# Patient Record
Sex: FEMALE | Race: WHITE | Employment: PART TIME
[De-identification: names, ages, dates, MRNs, and addresses within clinical notes are randomized per-mention and may not be internally consistent; named-entity substitution may affect disease eponyms.]

---

## 2017-01-06 LAB
CHOLESTEROL, TOTAL: 179 MG/DL
CHOLESTEROL/HDL RATIO: 3.1
HDLC SERPL-MCNC: 57 MG/DL (ref 35–70)
LDL CHOLESTEROL CALCULATED: 108 MG/DL (ref 0–160)
TRIGL SERPL-MCNC: 71 MG/DL
VLDLC SERPL CALC-MCNC: 14 MG/DL

## 2017-01-12 DIAGNOSIS — Z13.9 SCREENING: ICD-10-CM

## 2017-01-17 ENCOUNTER — OFFICE VISIT (OUTPATIENT)
Dept: INTERNAL MEDICINE | Facility: CLINIC | Age: 32
End: 2017-01-17

## 2017-01-17 VITALS
BODY MASS INDEX: 23.94 KG/M2 | RESPIRATION RATE: 16 BRPM | TEMPERATURE: 98.9 F | SYSTOLIC BLOOD PRESSURE: 120 MMHG | HEART RATE: 76 BPM | WEIGHT: 140.2 LBS | DIASTOLIC BLOOD PRESSURE: 70 MMHG

## 2017-01-17 DIAGNOSIS — G43.109 MIGRAINE WITH AURA AND WITHOUT STATUS MIGRAINOSUS, NOT INTRACTABLE: Primary | ICD-10-CM

## 2017-01-17 PROCEDURE — 99214 OFFICE O/P EST MOD 30 MIN: CPT | Performed by: FAMILY MEDICINE

## 2017-01-17 RX ORDER — ATENOLOL 25 MG/1
25 TABLET ORAL DAILY
Qty: 30 TABLET | Refills: 3 | Status: SHIPPED | OUTPATIENT
Start: 2017-01-17 | End: 2017-03-14 | Stop reason: ALTCHOICE

## 2017-01-17 RX ORDER — RIZATRIPTAN BENZOATE 10 MG/1
10 TABLET ORAL
Qty: 30 TABLET | Refills: 3 | Status: SHIPPED | OUTPATIENT
Start: 2017-01-17 | End: 2017-06-22 | Stop reason: SDUPTHER

## 2017-01-17 ASSESSMENT — ENCOUNTER SYMPTOMS
SORE THROAT: 0
STRIDOR: 0
ABDOMINAL PAIN: 0
BACK PAIN: 0
CONSTIPATION: 0
TROUBLE SWALLOWING: 0
FACIAL SWELLING: 0
ABDOMINAL DISTENTION: 0
EYE DISCHARGE: 0
EYE REDNESS: 0
SHORTNESS OF BREATH: 0
COUGH: 0
EYE PAIN: 0
ANAL BLEEDING: 0
WHEEZING: 0
COLOR CHANGE: 0
CHEST TIGHTNESS: 0

## 2017-03-14 ENCOUNTER — OFFICE VISIT (OUTPATIENT)
Dept: NEUROLOGY | Age: 32
End: 2017-03-14
Payer: COMMERCIAL

## 2017-03-14 VITALS
WEIGHT: 143.6 LBS | HEART RATE: 78 BPM | HEIGHT: 64 IN | BODY MASS INDEX: 24.52 KG/M2 | SYSTOLIC BLOOD PRESSURE: 106 MMHG | DIASTOLIC BLOOD PRESSURE: 86 MMHG

## 2017-03-14 DIAGNOSIS — G43.719 INTRACTABLE CHRONIC MIGRAINE WITHOUT AURA AND WITHOUT STATUS MIGRAINOSUS: Primary | ICD-10-CM

## 2017-03-14 DIAGNOSIS — G44.211 INTRACTABLE EPISODIC TENSION-TYPE HEADACHE: ICD-10-CM

## 2017-03-14 PROCEDURE — 99204 OFFICE O/P NEW MOD 45 MIN: CPT | Performed by: PSYCHIATRY & NEUROLOGY

## 2017-03-14 RX ORDER — AMITRIPTYLINE HYDROCHLORIDE 10 MG/1
TABLET, FILM COATED ORAL
Qty: 90 TABLET | Refills: 1 | Status: SHIPPED | OUTPATIENT
Start: 2017-03-14 | End: 2017-05-30 | Stop reason: DRUGHIGH

## 2017-03-14 RX ORDER — BUTALBITAL, ACETAMINOPHEN AND CAFFEINE 50; 325; 40 MG/1; MG/1; MG/1
TABLET ORAL
Qty: 30 TABLET | Refills: 0 | Status: SHIPPED | OUTPATIENT
Start: 2017-03-14 | End: 2017-06-22 | Stop reason: SDUPTHER

## 2017-03-14 RX ORDER — TIZANIDINE 2 MG/1
TABLET ORAL
Qty: 20 TABLET | Refills: 0 | Status: SHIPPED | OUTPATIENT
Start: 2017-03-14 | End: 2017-10-31 | Stop reason: SDUPTHER

## 2017-03-17 ENCOUNTER — OFFICE VISIT (OUTPATIENT)
Dept: INTERNAL MEDICINE CLINIC | Age: 32
End: 2017-03-17
Payer: COMMERCIAL

## 2017-03-17 VITALS
HEIGHT: 64 IN | TEMPERATURE: 98.6 F | OXYGEN SATURATION: 98 % | DIASTOLIC BLOOD PRESSURE: 64 MMHG | WEIGHT: 142 LBS | HEART RATE: 77 BPM | SYSTOLIC BLOOD PRESSURE: 100 MMHG | RESPIRATION RATE: 16 BRPM | BODY MASS INDEX: 24.24 KG/M2

## 2017-03-17 DIAGNOSIS — G43.109 MIGRAINE WITH AURA AND WITHOUT STATUS MIGRAINOSUS, NOT INTRACTABLE: Primary | ICD-10-CM

## 2017-03-17 PROCEDURE — 99213 OFFICE O/P EST LOW 20 MIN: CPT | Performed by: FAMILY MEDICINE

## 2017-03-17 ASSESSMENT — ENCOUNTER SYMPTOMS
TROUBLE SWALLOWING: 0
CHEST TIGHTNESS: 0
ABDOMINAL PAIN: 0
COLOR CHANGE: 0
EYE DISCHARGE: 0
EYE REDNESS: 0
EYE PAIN: 0
CONSTIPATION: 0
FACIAL SWELLING: 0
WHEEZING: 0
BACK PAIN: 0
ABDOMINAL DISTENTION: 0
SORE THROAT: 0
ANAL BLEEDING: 0
COUGH: 0
STRIDOR: 0
SHORTNESS OF BREATH: 0

## 2017-03-17 ASSESSMENT — PATIENT HEALTH QUESTIONNAIRE - PHQ9
1. LITTLE INTEREST OR PLEASURE IN DOING THINGS: 0
2. FEELING DOWN, DEPRESSED OR HOPELESS: 0
SUM OF ALL RESPONSES TO PHQ QUESTIONS 1-9: 0
SUM OF ALL RESPONSES TO PHQ9 QUESTIONS 1 & 2: 0

## 2017-04-10 ENCOUNTER — TELEPHONE (OUTPATIENT)
Dept: NEUROLOGY | Age: 32
End: 2017-04-10

## 2017-04-17 ENCOUNTER — TELEPHONE (OUTPATIENT)
Dept: NEUROLOGY | Age: 32
End: 2017-04-17

## 2017-04-27 RX ORDER — AMITRIPTYLINE HYDROCHLORIDE 50 MG/1
50 TABLET, FILM COATED ORAL NIGHTLY
Qty: 30 TABLET | Refills: 1 | Status: SHIPPED | OUTPATIENT
Start: 2017-04-27 | End: 2017-06-22 | Stop reason: SDUPTHER

## 2017-05-30 ENCOUNTER — OFFICE VISIT (OUTPATIENT)
Dept: NEUROLOGY | Age: 32
End: 2017-05-30
Payer: COMMERCIAL

## 2017-05-30 VITALS
DIASTOLIC BLOOD PRESSURE: 95 MMHG | SYSTOLIC BLOOD PRESSURE: 143 MMHG | BODY MASS INDEX: 23.73 KG/M2 | WEIGHT: 139 LBS | HEART RATE: 93 BPM | HEIGHT: 64 IN

## 2017-05-30 DIAGNOSIS — G43.719 INTRACTABLE CHRONIC MIGRAINE WITHOUT AURA AND WITHOUT STATUS MIGRAINOSUS: Primary | ICD-10-CM

## 2017-05-30 PROCEDURE — 99214 OFFICE O/P EST MOD 30 MIN: CPT | Performed by: NURSE PRACTITIONER

## 2017-06-22 DIAGNOSIS — G44.211 INTRACTABLE EPISODIC TENSION-TYPE HEADACHE: ICD-10-CM

## 2017-06-22 DIAGNOSIS — G43.719 INTRACTABLE CHRONIC MIGRAINE WITHOUT AURA AND WITHOUT STATUS MIGRAINOSUS: ICD-10-CM

## 2017-06-22 DIAGNOSIS — G43.109 MIGRAINE WITH AURA AND WITHOUT STATUS MIGRAINOSUS, NOT INTRACTABLE: ICD-10-CM

## 2017-06-22 RX ORDER — AMITRIPTYLINE HYDROCHLORIDE 50 MG/1
50 TABLET, FILM COATED ORAL NIGHTLY
Qty: 30 TABLET | Refills: 1 | Status: SHIPPED | OUTPATIENT
Start: 2017-06-22 | End: 2017-08-23 | Stop reason: SDUPTHER

## 2017-06-22 RX ORDER — BUTALBITAL, ACETAMINOPHEN AND CAFFEINE 50; 325; 40 MG/1; MG/1; MG/1
TABLET ORAL
Qty: 30 TABLET | Refills: 0 | Status: SHIPPED | OUTPATIENT
Start: 2017-06-22 | End: 2017-10-31 | Stop reason: SDUPTHER

## 2017-06-22 RX ORDER — RIZATRIPTAN BENZOATE 10 MG/1
10 TABLET ORAL
Qty: 30 TABLET | Refills: 3 | Status: SHIPPED | OUTPATIENT
Start: 2017-06-22 | End: 2018-03-07 | Stop reason: SDUPTHER

## 2017-08-23 RX ORDER — AMITRIPTYLINE HYDROCHLORIDE 50 MG/1
50 TABLET, FILM COATED ORAL NIGHTLY
Qty: 30 TABLET | Refills: 2 | Status: SHIPPED | OUTPATIENT
Start: 2017-08-23 | End: 2017-11-13 | Stop reason: SDUPTHER

## 2017-09-22 ENCOUNTER — OFFICE VISIT (OUTPATIENT)
Dept: INTERNAL MEDICINE CLINIC | Age: 32
End: 2017-09-22
Payer: COMMERCIAL

## 2017-09-22 VITALS
SYSTOLIC BLOOD PRESSURE: 110 MMHG | RESPIRATION RATE: 25 BRPM | OXYGEN SATURATION: 98 % | HEIGHT: 64 IN | HEART RATE: 108 BPM | BODY MASS INDEX: 24.11 KG/M2 | DIASTOLIC BLOOD PRESSURE: 80 MMHG | TEMPERATURE: 98 F | WEIGHT: 141.2 LBS

## 2017-09-22 DIAGNOSIS — Z83.3 FAMILY HISTORY OF DIABETES MELLITUS (DM): ICD-10-CM

## 2017-09-22 DIAGNOSIS — J31.0 CHRONIC RHINITIS: ICD-10-CM

## 2017-09-22 DIAGNOSIS — G43.009 MIGRAINE WITHOUT AURA AND WITHOUT STATUS MIGRAINOSUS, NOT INTRACTABLE: Primary | ICD-10-CM

## 2017-09-22 PROBLEM — G44.211 INTRACTABLE EPISODIC TENSION-TYPE HEADACHE: Status: RESOLVED | Noted: 2017-03-14 | Resolved: 2017-09-22

## 2017-09-22 PROCEDURE — 99213 OFFICE O/P EST LOW 20 MIN: CPT | Performed by: FAMILY MEDICINE

## 2017-09-22 ASSESSMENT — ENCOUNTER SYMPTOMS
EYES NEGATIVE: 1
ALLERGIC/IMMUNOLOGIC NEGATIVE: 1
RESPIRATORY NEGATIVE: 1
GASTROINTESTINAL NEGATIVE: 1

## 2017-10-27 ENCOUNTER — OFFICE VISIT (OUTPATIENT)
Dept: INTERNAL MEDICINE CLINIC | Age: 32
End: 2017-10-27
Payer: COMMERCIAL

## 2017-10-27 ENCOUNTER — HOSPITAL ENCOUNTER (OUTPATIENT)
Age: 32
Setting detail: SPECIMEN
Discharge: HOME | End: 2017-10-27
Payer: COMMERCIAL

## 2017-10-27 VITALS — WEIGHT: 141.09 LBS | HEIGHT: 64 IN | BODY MASS INDEX: 24.09 KG/M2

## 2017-10-27 DIAGNOSIS — G43.719 INTRACTABLE CHRONIC MIGRAINE WITHOUT AURA AND WITHOUT STATUS MIGRAINOSUS: ICD-10-CM

## 2017-10-27 DIAGNOSIS — N63.20 BREAST MASS, LEFT: ICD-10-CM

## 2017-10-27 DIAGNOSIS — F41.9 ANXIETY: ICD-10-CM

## 2017-10-27 DIAGNOSIS — N76.1 SUBACUTE VAGINITIS: ICD-10-CM

## 2017-10-27 DIAGNOSIS — R92.2 DENSE BREAST TISSUE: ICD-10-CM

## 2017-10-27 DIAGNOSIS — Z01.419 PAP SMEAR, AS PART OF ROUTINE GYNECOLOGICAL EXAMINATION: ICD-10-CM

## 2017-10-27 DIAGNOSIS — N76.1 SUBACUTE VAGINITIS: Primary | ICD-10-CM

## 2017-10-27 DIAGNOSIS — N60.02 CYST OF LEFT BREAST: ICD-10-CM

## 2017-10-27 LAB
CONTROL: NORMAL
PREGNANCY TEST URINE, POC: NEGATIVE

## 2017-10-27 PROCEDURE — 81025 URINE PREGNANCY TEST: CPT | Performed by: FAMILY MEDICINE

## 2017-10-27 PROCEDURE — 99214 OFFICE O/P EST MOD 30 MIN: CPT | Performed by: FAMILY MEDICINE

## 2017-10-27 ASSESSMENT — ENCOUNTER SYMPTOMS
RESPIRATORY NEGATIVE: 1
GASTROINTESTINAL NEGATIVE: 1
EYES NEGATIVE: 1
ALLERGIC/IMMUNOLOGIC NEGATIVE: 1

## 2017-10-27 NOTE — PROGRESS NOTES
Chronic Disease Visit Information    BP Readings from Last 3 Encounters:   09/22/17 110/80   05/30/17 (!) 143/95   03/17/17 100/64          LDL Calculated (mg/dL)   Date Value   01/06/2017 108     HDL (mg/dL)   Date Value   01/06/2017 57            Have you changed or started any medications since your last visit including any over-the-counter medicines, vitamins, or herbal medicines? no   Are you having any side effects from any of your medications? -  no  Have you stopped taking any of your medications? Is so, why? -  no    Have you seen any other physician or provider since your last visit? No  Have you had any other diagnostic tests since your last visit? No  Have you been seen in the emergency room and/or had an admission to a hospital since we last saw you? No  Have you had your annual diabetic retinal (eye) exam? No  Have you had your routine dental cleaning in the past 6 months? no    Have you activated your [employer] account? If not, what are your barriers?  Yes     Patient Care Team:  Rody Trimble MD as PCP - General (Family Medicine)  Sarah Win MD as Consulting Physician (Neurology)         Medical History Review  Past Medical, Family, and Social History reviewed and does contribute to the patient presenting condition    Health Maintenance   Topic Date Due    Cervical cancer screen  10/16/2018 (Originally 1/15/2017)    HIV screen  10/16/2018 (Originally 2/2/2001)    Flu vaccine (1) 10/23/2018 (Originally 9/1/2017)    DTaP/Tdap/Td vaccine (3 - Td) 12/20/2026
was told to be a benign cyst.  Underlying history of migraine headache without aura. Neurologically intact. She is provided with reassurance, improved oral hydration, sleep hygiene, relaxation, daily moderate exercise. Refrain from judicious use of over-the-counter pain medication. Underlying history of anxiety. Continue Elavil  She denies significant alcohol or illicit drug use  Further recommendations to follow  Call for any concern  This note is created with a voice recognition program and while intend to generate a document that accurately reflects the content of the visit, no guarantee can be provided that every mistake has been identified and corrected by editing.

## 2017-10-30 LAB
CHLAMYDIA BY THIN PREP: NEGATIVE
N. GONORRHOEAE DNA, THIN PREP: NEGATIVE

## 2017-10-31 ENCOUNTER — OFFICE VISIT (OUTPATIENT)
Dept: NEUROLOGY | Age: 32
End: 2017-10-31
Payer: COMMERCIAL

## 2017-10-31 VITALS
DIASTOLIC BLOOD PRESSURE: 96 MMHG | BODY MASS INDEX: 23.9 KG/M2 | SYSTOLIC BLOOD PRESSURE: 147 MMHG | HEIGHT: 64 IN | HEART RATE: 90 BPM | WEIGHT: 140 LBS

## 2017-10-31 DIAGNOSIS — G44.211 INTRACTABLE EPISODIC TENSION-TYPE HEADACHE: ICD-10-CM

## 2017-10-31 DIAGNOSIS — G43.719 INTRACTABLE CHRONIC MIGRAINE WITHOUT AURA AND WITHOUT STATUS MIGRAINOSUS: Primary | ICD-10-CM

## 2017-10-31 DIAGNOSIS — M62.838 MUSCLE SPASM: ICD-10-CM

## 2017-10-31 PROCEDURE — 99214 OFFICE O/P EST MOD 30 MIN: CPT | Performed by: PSYCHIATRY & NEUROLOGY

## 2017-10-31 RX ORDER — BUTALBITAL, ACETAMINOPHEN AND CAFFEINE 50; 325; 40 MG/1; MG/1; MG/1
TABLET ORAL
Qty: 30 TABLET | Refills: 0 | Status: SHIPPED | OUTPATIENT
Start: 2017-10-31 | End: 2018-02-19 | Stop reason: SDUPTHER

## 2017-10-31 RX ORDER — AMITRIPTYLINE HYDROCHLORIDE 10 MG/1
TABLET, FILM COATED ORAL
Qty: 60 TABLET | Refills: 3 | Status: SHIPPED | OUTPATIENT
Start: 2017-10-31 | End: 2018-06-14 | Stop reason: ALTCHOICE

## 2017-10-31 RX ORDER — TIZANIDINE 2 MG/1
TABLET ORAL
Qty: 30 TABLET | Refills: 1 | Status: SHIPPED | OUTPATIENT
Start: 2017-10-31 | End: 2018-02-19 | Stop reason: SDUPTHER

## 2017-10-31 NOTE — PROGRESS NOTES
breast    TUBAL LIGATION  2014     Social History: Sharyl Mcburney  reports that she has never smoked. She has never used smokeless tobacco. She reports that she does not drink alcohol or use drugs. Family History   Problem Relation Age of Onset    High Blood Pressure Father     Migraines Sister     Migraines Paternal Grandmother     Heart Disease Paternal Grandmother     Cancer Paternal Grandfather      brain       On exam: Blood pressure (!) 147/96, pulse 90, height 5' 4\" (1.626 m), weight 140 lb (63.5 kg), not currently breastfeeding. GENERAL  Appears comfortable and in no distress   HEENT  NC/ AT   NECK  Supple and no bruits heard   MENTAL STATUS:  Alert, oriented, intact memory, no confusion, normal speech, normal language, no hallucination or delusion   CRANIAL NERVES: II     -      Visual fields intact to confrontation  III,IV,VI -  EOMs full, no afferent defect, no                      KEHINDE, no ptosis  V     -     Normal facial sensation  VII    -     Normal facial symmetry  VIII   -     Intact hearing  IX,X -     Symmetrical palate  XI    -     Symmetrical shoulder shrug  XII   -     Midline tongue, no atrophy    MOTOR FUNCTION:  significant for good strength of grade 5/5 in bilateral proximal and distal muscle groups of both upper and lower extremities with normal bulk, normal tone and no involuntary movements, no tremor   SENSORY FUNCTION:  Normal touch, normal pin, normal vibration, normal proprioception   CEREBELLAR FUNCTION:  Intact fine motor control over upper limbs   REFLEX FUNCTION:  Symmetric, no perverted reflex, no Babinski sign   STATION and GAIT  Normal station, normal gait       Diagnostic data reviewed with the patient:   MRI Brain (w/wo) (1/31/17): No intracranial abnormality. Normal MRI of the brain.           Impression and Plan: Ms. Sharyl Mcburney is a 33 y.o. female with   Intractable migrainous headaches without aura; chronic episodic tension-type headaches; muscle

## 2017-11-06 LAB — CYTOLOGY REPORT: NORMAL

## 2017-11-15 RX ORDER — AMITRIPTYLINE HYDROCHLORIDE 50 MG/1
50 TABLET, FILM COATED ORAL NIGHTLY
Qty: 30 TABLET | Refills: 2 | Status: SHIPPED | OUTPATIENT
Start: 2017-11-15 | End: 2018-02-19 | Stop reason: SDUPTHER

## 2018-02-19 DIAGNOSIS — G43.719 INTRACTABLE CHRONIC MIGRAINE WITHOUT AURA AND WITHOUT STATUS MIGRAINOSUS: ICD-10-CM

## 2018-02-19 DIAGNOSIS — M62.838 MUSCLE SPASM: ICD-10-CM

## 2018-02-19 DIAGNOSIS — G44.211 INTRACTABLE EPISODIC TENSION-TYPE HEADACHE: ICD-10-CM

## 2018-02-19 RX ORDER — AMITRIPTYLINE HYDROCHLORIDE 50 MG/1
50 TABLET, FILM COATED ORAL NIGHTLY
Qty: 30 TABLET | Refills: 0 | Status: SHIPPED | OUTPATIENT
Start: 2018-02-19 | End: 2018-06-14 | Stop reason: ALTCHOICE

## 2018-02-19 RX ORDER — TIZANIDINE 2 MG/1
TABLET ORAL
Qty: 30 TABLET | Refills: 0 | Status: SHIPPED | OUTPATIENT
Start: 2018-02-19 | End: 2018-03-07 | Stop reason: SDUPTHER

## 2018-02-19 RX ORDER — BUTALBITAL, ACETAMINOPHEN AND CAFFEINE 50; 325; 40 MG/1; MG/1; MG/1
TABLET ORAL
Qty: 30 TABLET | Refills: 0 | Status: SHIPPED | OUTPATIENT
Start: 2018-02-19 | End: 2018-03-07 | Stop reason: SDUPTHER

## 2018-03-07 ENCOUNTER — OFFICE VISIT (OUTPATIENT)
Dept: NEUROLOGY | Age: 33
End: 2018-03-07
Payer: COMMERCIAL

## 2018-03-07 VITALS
BODY MASS INDEX: 23.8 KG/M2 | HEIGHT: 64 IN | DIASTOLIC BLOOD PRESSURE: 92 MMHG | SYSTOLIC BLOOD PRESSURE: 138 MMHG | HEART RATE: 104 BPM | WEIGHT: 139.4 LBS

## 2018-03-07 DIAGNOSIS — M62.838 MUSCLE SPASM: ICD-10-CM

## 2018-03-07 DIAGNOSIS — G44.221 CHRONIC TENSION-TYPE HEADACHE, INTRACTABLE: Primary | ICD-10-CM

## 2018-03-07 DIAGNOSIS — G43.109 MIGRAINE WITH AURA AND WITHOUT STATUS MIGRAINOSUS, NOT INTRACTABLE: ICD-10-CM

## 2018-03-07 PROCEDURE — 99214 OFFICE O/P EST MOD 30 MIN: CPT | Performed by: PSYCHIATRY & NEUROLOGY

## 2018-03-07 RX ORDER — RIZATRIPTAN BENZOATE 10 MG/1
10 TABLET ORAL
Qty: 30 TABLET | Refills: 3 | Status: SHIPPED | OUTPATIENT
Start: 2018-03-07 | End: 2018-06-14 | Stop reason: SDUPTHER

## 2018-03-07 RX ORDER — TIZANIDINE 4 MG/1
TABLET ORAL
Qty: 30 TABLET | Refills: 1 | Status: SHIPPED | OUTPATIENT
Start: 2018-03-07 | End: 2018-06-14 | Stop reason: SDUPTHER

## 2018-03-07 RX ORDER — BUTALBITAL, ACETAMINOPHEN AND CAFFEINE 50; 325; 40 MG/1; MG/1; MG/1
TABLET ORAL
Qty: 30 TABLET | Refills: 0 | Status: SHIPPED | OUTPATIENT
Start: 2018-03-07 | End: 2018-06-14 | Stop reason: SDUPTHER

## 2018-03-07 RX ORDER — GABAPENTIN 100 MG/1
CAPSULE ORAL
Qty: 120 CAPSULE | Refills: 3 | Status: SHIPPED | OUTPATIENT
Start: 2018-03-07 | End: 2018-06-14 | Stop reason: SDUPTHER

## 2018-03-07 NOTE — PROGRESS NOTES
NEUROLOGY FOLLOW-UP  Patient Name:  Manuel Chun  :   1985  Clinic Visit Date: 3/7/2018    I saw Ms. Manuel Chun in follow-up in the office today in continuation of neurologic care. She is a 29 y.o. right handed  female initially seen in neurologic consultation in 2017 for for evaluation of worsening headaches in frequency and severity. She stated that she had headaches since teenage years \"for at least 13 years\". Today she comes to chronic for follow-up stating that Maxalt has been helping for migraine attacks. Those were described as severe throbbing pain located in left parietotemporal region without any radiation. Those headaches are associated with photophobia, phonophobia and nausea and occur at extremely severe intensity. Maxalt always helps. Rarely she needed to take twice in a day. On average she has these severe migraine attacks only twice or thrice a month. But she also stated that she has been having chronic daily headaches described as mild to moderate headaches not associated with photophobia and phonophobia. Those headaches always occur in back of the head radiating to top of the head and they are not associated with nausea and vomiting. She has tried NSAIDs without any relief. She is trying to [place] with those\". She also stated that increasing the dose of amitriptyline has not helped. She is presently taking amitriptyline 70 mg nightly. She does not want to increase the dose of amitriptyline and she is asking for any other alternatives. She also stated that she never had any strokelike symptomatology associated with headaches. Denies diplopia, vertigo, facial numbness and weakness, dizziness and loss of consciousness. Denies recent febrile illnesses. She does admit to having neck stiffness and neck pain but denies radiating pain and paresthesias. He denies bladder dysfunction. Denies numbness or weakness in extremities.   Of note; she has family reviewed with the patient:   MRI Brain (w/wo) (1/31/17): No intracranial abnormality. Normal MRI of the brain. CBC:   No results found for: WBC, HGB, PLT   BMP:   No results found for: NA, K, GLUCOSE, CALCIUM      Lab Results   Component Value Date    CHOL 179 01/06/2017    HDL 57 01/06/2017    TRIG 71 01/06/2017               Impression and Plan: Ms. Carroll Grimes is a 29 y.o. female with   Intractable and chronic daily tension type headaches; no significant improvement on amitriptyline   Intermittent severe migrainous headaches without aura: Responding to triptan therapy  Will continue Maxalt as needed only as she has been responding well to it. Will start her on gabapentin at gradual dose escalation. Will continue amitriptyline 50 g nightly at this point of time. Also advised to take tizanidine 4 mg nightly for muscle spasms. As her symptoms do not indicate radiculopathy and presentation is not suggestive of myelopathy; I do not see any need to get MRI cervical spine at this point of time. Patient was strongly advised to contact the clinic should she develop any of those symptomatology. She voiced understanding those instructions. Medication Counseling: risks and benefits including possible adverse effects discussed with the patient and she verbalized understanding those instructions. Follow-up in 3 months or sooner for further questions and concerns. Please note that portions of this note were completed with a voice recognition program.  Although every effort was made to ensure the accuracy of this  automated transcription, some errors in transcription may have occurred, occasionally words are mis-transcribed.

## 2018-06-14 ENCOUNTER — OFFICE VISIT (OUTPATIENT)
Dept: NEUROLOGY | Age: 33
End: 2018-06-14
Payer: COMMERCIAL

## 2018-06-14 VITALS
SYSTOLIC BLOOD PRESSURE: 131 MMHG | WEIGHT: 139 LBS | BODY MASS INDEX: 23.73 KG/M2 | HEIGHT: 64 IN | DIASTOLIC BLOOD PRESSURE: 88 MMHG | HEART RATE: 83 BPM

## 2018-06-14 DIAGNOSIS — G44.221 CHRONIC TENSION-TYPE HEADACHE, INTRACTABLE: Primary | ICD-10-CM

## 2018-06-14 DIAGNOSIS — R11.0 NAUSEA: ICD-10-CM

## 2018-06-14 DIAGNOSIS — M62.838 MUSCLE SPASM: ICD-10-CM

## 2018-06-14 DIAGNOSIS — G43.109 MIGRAINE WITH AURA AND WITHOUT STATUS MIGRAINOSUS, NOT INTRACTABLE: ICD-10-CM

## 2018-06-14 PROCEDURE — 99214 OFFICE O/P EST MOD 30 MIN: CPT | Performed by: PSYCHIATRY & NEUROLOGY

## 2018-06-14 RX ORDER — TIZANIDINE 4 MG/1
TABLET ORAL
Qty: 30 TABLET | Refills: 1 | Status: SHIPPED | OUTPATIENT
Start: 2018-06-14 | End: 2018-09-19 | Stop reason: SDUPTHER

## 2018-06-14 RX ORDER — GABAPENTIN 300 MG/1
CAPSULE ORAL
Qty: 60 CAPSULE | Refills: 3 | Status: SHIPPED | OUTPATIENT
Start: 2018-06-14 | End: 2018-09-19 | Stop reason: SDUPTHER

## 2018-06-14 RX ORDER — BUTALBITAL, ACETAMINOPHEN AND CAFFEINE 50; 325; 40 MG/1; MG/1; MG/1
TABLET ORAL
Qty: 40 TABLET | Refills: 0 | Status: SHIPPED | OUTPATIENT
Start: 2018-06-14 | End: 2019-01-07 | Stop reason: SDUPTHER

## 2018-06-14 RX ORDER — ONDANSETRON 4 MG/1
TABLET, FILM COATED ORAL
Qty: 40 TABLET | Refills: 1 | Status: SHIPPED | OUTPATIENT
Start: 2018-06-14 | End: 2021-03-23 | Stop reason: SDUPTHER

## 2018-06-14 RX ORDER — RIZATRIPTAN BENZOATE 10 MG/1
10 TABLET ORAL
Qty: 30 TABLET | Refills: 3 | Status: SHIPPED | OUTPATIENT
Start: 2018-06-14 | End: 2018-09-19 | Stop reason: SDUPTHER

## 2018-09-19 ENCOUNTER — OFFICE VISIT (OUTPATIENT)
Dept: NEUROLOGY | Age: 33
End: 2018-09-19
Payer: COMMERCIAL

## 2018-09-19 VITALS
HEART RATE: 82 BPM | DIASTOLIC BLOOD PRESSURE: 101 MMHG | WEIGHT: 139.6 LBS | SYSTOLIC BLOOD PRESSURE: 138 MMHG | HEIGHT: 64 IN | BODY MASS INDEX: 23.83 KG/M2

## 2018-09-19 DIAGNOSIS — G44.221 CHRONIC TENSION-TYPE HEADACHE, INTRACTABLE: Primary | ICD-10-CM

## 2018-09-19 DIAGNOSIS — M62.838 MUSCLE SPASM: ICD-10-CM

## 2018-09-19 DIAGNOSIS — G43.109 MIGRAINE WITH AURA AND WITHOUT STATUS MIGRAINOSUS, NOT INTRACTABLE: ICD-10-CM

## 2018-09-19 DIAGNOSIS — R51.9 CHRONIC DAILY HEADACHE: ICD-10-CM

## 2018-09-19 PROCEDURE — 99214 OFFICE O/P EST MOD 30 MIN: CPT | Performed by: PSYCHIATRY & NEUROLOGY

## 2018-09-19 RX ORDER — PREDNISONE 10 MG/1
TABLET ORAL
Qty: 25 TABLET | Refills: 0 | Status: SHIPPED | OUTPATIENT
Start: 2018-09-19 | End: 2018-09-29

## 2018-09-19 RX ORDER — TIZANIDINE 4 MG/1
TABLET ORAL
Qty: 30 TABLET | Refills: 3 | Status: SHIPPED | OUTPATIENT
Start: 2018-09-19 | End: 2019-01-15 | Stop reason: SDUPTHER

## 2018-09-19 RX ORDER — RIZATRIPTAN BENZOATE 10 MG/1
10 TABLET ORAL
Qty: 30 TABLET | Refills: 3 | Status: SHIPPED | OUTPATIENT
Start: 2018-09-19 | End: 2019-01-07 | Stop reason: SDUPTHER

## 2018-09-19 RX ORDER — GABAPENTIN 400 MG/1
CAPSULE ORAL
Qty: 60 CAPSULE | Refills: 3 | Status: SHIPPED | OUTPATIENT
Start: 2018-09-19 | End: 2019-01-07 | Stop reason: SDUPTHER

## 2018-09-19 NOTE — PROGRESS NOTES
PERRLA; VA 20/20 OU., Visual fields intact to confrontation  III,IV,VI -  EOMs full, no afferent defect, no  KEHINDE, no ptosis  V     -     Normal facial sensation  VII    -     Normal facial symmetry  VIII   -     Intact hearing  IX,X -     Symmetrical palate  XI    -     Symmetrical shoulder shrug  XII   -     Midline tongue, no atrophy    MOTOR FUNCTION:  significant for good strength of grade 5/5 in bilateral proximal and distal muscle groups of both upper and lower extremities with normal bulk, normal tone and no involuntary movements, no tremor   SENSORY FUNCTION:  Normal touch, normal pin, normal vibration, normal proprioception   CEREBELLAR FUNCTION:  Intact fine motor control over upper limbs   REFLEX FUNCTION:  Symmetric, no perverted reflex, no Babinski sign   STATION and GAIT  Normal station, normal gait       Diagnostic data reviewed with the patient:   MRI Brain (w/wo) (1/31/17): No intracranial abnormality. Normal MRI of the brain. CBC:   No results found for: WBC, HGB, PLT   BMP:   No results found for: NA, K, GLUCOSE, CALCIUM      Lab Results   Component Value Date    CHOL 179 01/06/2017    HDL 57 01/06/2017    TRIG 71 01/06/2017             Impression and Plan: Ms. Luis Miguel Sylvester is a 29 y.o. female with   Chronic daily headache with tension type headache features: Inadequately controlled. Migraine headaches with aura: Marginally controlled in frequency and severity. Nausea associated with above    Will start her on smaller doses of prednisone 20 mg every morning for 1 week and then 10 mg for next week to break headache cycle. Will increase the dose of gabapentin from 300mg to 400 mg bid to be taken at 9 am and 5 pm along with Tizanidine nightly for migraine  headache prophylaxis and to continue Maxalt alternating with butalbital for severe attacks. Zofran prn nausea. Also discussed about magnesium oxide 400mg qd for headache prophylaxis and she has been taking it on her own.   Will also start her on recently approved migraine preventive med, Aimovig (Erenumab) 70mg once monthly injection. She understood that it is administered as a self-injection with a subcutaneous auto-injector at home. As per studies; most of the patients responded with a significant clinical benefit within a month. Medication Counseling: risks and benefits including possible adverse effects discussed with the patient and she verbalized understanding those instructions. Follow-up in 2-3 months or sooner for further questions and concerns. Please note that portions of this note were completed with a voice recognition program.  Although every effort was made to ensure the accuracy of this  automated transcription, some errors in transcription may have occurred, occasionally words are mis-transcribed.

## 2018-09-19 NOTE — PROGRESS NOTES
NEUROLOGY FOLLOW-UP  Patient Name:  Elmer Rosenthal  :   1985  Clinic Visit Date: 2018    I saw Ms. Elmer Rosenthal in follow-up in the office today in continuation of neurologic care. REVIEW OF SYSTEMS  Constitutional Weight changes: absent, Fevers : absent, Fatigue: absent; Any recent hospitalizations:  absent.    HEENT Ears: normal, Eyes: glasses, Visual disturbance: absent   Reespiratory Shortness of breath: absent, Cough: absent   Cardivascular Chest pain: absent, Leg swelling :absent   GI Constipation: absent, Diarrhea: absent, Swallowing change: absent    Urinary frequency: absent, Urinary urgency: absent, Urinary incontinence: absent   Musculoskeletal Neck pain: absent, Back pain: absent, Stiffness: absent, Muscle pain: absent, Joint pain: absent   Dermatological Hair loss: absent, Skin changes: absent   Neurological Memory loss: absent, Confusion: absent, Seizures: absent; Trouble walking or imbalance: absent, Dizziness: absent, Weakness: absent, Numbness absent, Tremor: absent, Spasm: absent, Speech difficulty: absent, Headache: present, Light sensitivity: absent   Psychiatric Anxiety: present, Depression  absent, Suicidal ideations absent   Hematologic Abnormal bleeding: absent, Anemia: absent, Clotting disorder: absent, Lymph gland changes: absent

## 2018-10-01 ENCOUNTER — TELEPHONE (OUTPATIENT)
Dept: NEUROLOGY | Age: 33
End: 2018-10-01

## 2019-01-07 ENCOUNTER — OFFICE VISIT (OUTPATIENT)
Dept: NEUROLOGY | Age: 34
End: 2019-01-07
Payer: COMMERCIAL

## 2019-01-07 VITALS
HEART RATE: 84 BPM | WEIGHT: 140.8 LBS | DIASTOLIC BLOOD PRESSURE: 90 MMHG | HEIGHT: 64 IN | BODY MASS INDEX: 24.04 KG/M2 | SYSTOLIC BLOOD PRESSURE: 125 MMHG

## 2019-01-07 DIAGNOSIS — R29.898 RIGHT ARM WEAKNESS: Primary | ICD-10-CM

## 2019-01-07 DIAGNOSIS — G44.221 CHRONIC TENSION-TYPE HEADACHE, INTRACTABLE: ICD-10-CM

## 2019-01-07 DIAGNOSIS — G37.9 DEMYELINATING DISEASE OF CENTRAL NERVOUS SYSTEM (HCC): ICD-10-CM

## 2019-01-07 DIAGNOSIS — G43.109 MIGRAINE WITH AURA AND WITHOUT STATUS MIGRAINOSUS, NOT INTRACTABLE: ICD-10-CM

## 2019-01-07 DIAGNOSIS — M54.2 NECK PAIN: ICD-10-CM

## 2019-01-07 PROCEDURE — 99214 OFFICE O/P EST MOD 30 MIN: CPT | Performed by: PSYCHIATRY & NEUROLOGY

## 2019-01-07 RX ORDER — BUTALBITAL, ACETAMINOPHEN AND CAFFEINE 50; 325; 40 MG/1; MG/1; MG/1
TABLET ORAL
Qty: 40 TABLET | Refills: 0 | Status: SHIPPED | OUTPATIENT
Start: 2019-01-07 | End: 2019-04-08 | Stop reason: SDUPTHER

## 2019-01-07 RX ORDER — RIZATRIPTAN BENZOATE 10 MG/1
10 TABLET ORAL
Qty: 30 TABLET | Refills: 3 | Status: SHIPPED | OUTPATIENT
Start: 2019-01-07 | End: 2020-03-18 | Stop reason: SDUPTHER

## 2019-01-07 RX ORDER — GABAPENTIN 400 MG/1
CAPSULE ORAL
Qty: 60 CAPSULE | Refills: 3 | Status: SHIPPED | OUTPATIENT
Start: 2019-01-07 | End: 2019-04-08 | Stop reason: SDUPTHER

## 2019-01-15 DIAGNOSIS — M62.838 MUSCLE SPASM: ICD-10-CM

## 2019-01-15 DIAGNOSIS — G44.221 CHRONIC TENSION-TYPE HEADACHE, INTRACTABLE: ICD-10-CM

## 2019-01-15 RX ORDER — TIZANIDINE 4 MG/1
TABLET ORAL
Qty: 30 TABLET | Refills: 3 | Status: SHIPPED | OUTPATIENT
Start: 2019-01-15 | End: 2019-04-08 | Stop reason: SDUPTHER

## 2019-01-17 ENCOUNTER — TELEPHONE (OUTPATIENT)
Dept: NEUROLOGY | Age: 34
End: 2019-01-17

## 2019-01-17 DIAGNOSIS — M54.2 NECK PAIN: ICD-10-CM

## 2019-01-17 DIAGNOSIS — R29.898 RIGHT ARM WEAKNESS: Primary | ICD-10-CM

## 2019-01-18 ENCOUNTER — HOSPITAL ENCOUNTER (OUTPATIENT)
Dept: MRI IMAGING | Age: 34
Discharge: HOME | End: 2019-01-20
Payer: COMMERCIAL

## 2019-01-18 DIAGNOSIS — R29.898 RIGHT ARM WEAKNESS: ICD-10-CM

## 2019-01-18 DIAGNOSIS — M54.2 NECK PAIN: ICD-10-CM

## 2019-01-18 DIAGNOSIS — G37.9 DEMYELINATING DISEASE OF CENTRAL NERVOUS SYSTEM (HCC): ICD-10-CM

## 2019-01-18 PROCEDURE — A9576 INJ PROHANCE MULTIPACK: HCPCS | Performed by: PSYCHIATRY & NEUROLOGY

## 2019-01-18 PROCEDURE — 72156 MRI NECK SPINE W/O & W/DYE: CPT

## 2019-01-18 PROCEDURE — 6360000004 HC RX CONTRAST MEDICATION: Performed by: PSYCHIATRY & NEUROLOGY

## 2019-01-18 RX ADMIN — GADOTERIDOL 12 ML: 279.3 INJECTION, SOLUTION INTRAVENOUS at 18:43

## 2019-01-22 ENCOUNTER — HOSPITAL ENCOUNTER (OUTPATIENT)
Dept: PHYSICAL THERAPY | Age: 34
Setting detail: THERAPIES SERIES
Discharge: HOME | End: 2019-01-22
Payer: COMMERCIAL

## 2019-01-22 PROCEDURE — 97140 MANUAL THERAPY 1/> REGIONS: CPT

## 2019-01-22 PROCEDURE — 97110 THERAPEUTIC EXERCISES: CPT

## 2019-01-22 PROCEDURE — 97161 PT EVAL LOW COMPLEX 20 MIN: CPT

## 2019-01-24 ENCOUNTER — OFFICE VISIT (OUTPATIENT)
Dept: NEUROLOGY | Age: 34
End: 2019-01-24
Payer: COMMERCIAL

## 2019-01-24 DIAGNOSIS — M79.2 RADICULAR PAIN IN RIGHT ARM: Primary | ICD-10-CM

## 2019-01-24 DIAGNOSIS — R20.0 NUMBNESS AND TINGLING IN RIGHT HAND: ICD-10-CM

## 2019-01-24 DIAGNOSIS — R20.2 NUMBNESS AND TINGLING IN RIGHT HAND: ICD-10-CM

## 2019-01-24 PROCEDURE — 95886 MUSC TEST DONE W/N TEST COMP: CPT | Performed by: PSYCHIATRY & NEUROLOGY

## 2019-01-24 PROCEDURE — 95911 NRV CNDJ TEST 9-10 STUDIES: CPT | Performed by: PSYCHIATRY & NEUROLOGY

## 2019-01-24 RX ORDER — PREDNISONE 20 MG/1
TABLET ORAL
Qty: 20 TABLET | Refills: 0 | Status: SHIPPED | OUTPATIENT
Start: 2019-01-24 | End: 2019-02-09

## 2019-02-01 ENCOUNTER — HOSPITAL ENCOUNTER (OUTPATIENT)
Dept: PHYSICAL THERAPY | Age: 34
Setting detail: THERAPIES SERIES
Discharge: HOME | End: 2019-02-01
Payer: COMMERCIAL

## 2019-02-01 PROCEDURE — 97110 THERAPEUTIC EXERCISES: CPT

## 2019-02-01 PROCEDURE — 97016 VASOPNEUMATIC DEVICE THERAPY: CPT

## 2019-02-08 ENCOUNTER — HOSPITAL ENCOUNTER (OUTPATIENT)
Dept: PHYSICAL THERAPY | Age: 34
Setting detail: THERAPIES SERIES
Discharge: HOME | End: 2019-02-08
Payer: COMMERCIAL

## 2019-02-08 PROCEDURE — 97016 VASOPNEUMATIC DEVICE THERAPY: CPT

## 2019-02-08 PROCEDURE — 97110 THERAPEUTIC EXERCISES: CPT

## 2019-02-15 ENCOUNTER — HOSPITAL ENCOUNTER (OUTPATIENT)
Dept: PHYSICAL THERAPY | Age: 34
Setting detail: THERAPIES SERIES
Discharge: HOME | End: 2019-02-15
Payer: COMMERCIAL

## 2019-02-15 PROCEDURE — 97016 VASOPNEUMATIC DEVICE THERAPY: CPT

## 2019-02-15 PROCEDURE — 97110 THERAPEUTIC EXERCISES: CPT

## 2019-02-22 ENCOUNTER — HOSPITAL ENCOUNTER (OUTPATIENT)
Dept: PHYSICAL THERAPY | Age: 34
Setting detail: THERAPIES SERIES
Discharge: HOME | End: 2019-02-22
Payer: COMMERCIAL

## 2019-02-22 PROCEDURE — G0283 ELEC STIM OTHER THAN WOUND: HCPCS

## 2019-02-22 PROCEDURE — 97110 THERAPEUTIC EXERCISES: CPT

## 2019-03-01 ENCOUNTER — HOSPITAL ENCOUNTER (OUTPATIENT)
Dept: PHYSICAL THERAPY | Age: 34
Setting detail: THERAPIES SERIES
End: 2019-03-01
Payer: COMMERCIAL

## 2019-03-08 ENCOUNTER — HOSPITAL ENCOUNTER (OUTPATIENT)
Dept: PHYSICAL THERAPY | Age: 34
Setting detail: THERAPIES SERIES
Discharge: HOME | End: 2019-03-08
Payer: COMMERCIAL

## 2019-03-08 PROCEDURE — G0283 ELEC STIM OTHER THAN WOUND: HCPCS

## 2019-03-08 PROCEDURE — 97110 THERAPEUTIC EXERCISES: CPT

## 2019-03-15 ENCOUNTER — HOSPITAL ENCOUNTER (OUTPATIENT)
Dept: PHYSICAL THERAPY | Age: 34
Setting detail: THERAPIES SERIES
Discharge: HOME | End: 2019-03-15
Payer: COMMERCIAL

## 2019-03-15 PROCEDURE — G0283 ELEC STIM OTHER THAN WOUND: HCPCS

## 2019-03-15 PROCEDURE — 97110 THERAPEUTIC EXERCISES: CPT

## 2019-03-22 ENCOUNTER — HOSPITAL ENCOUNTER (OUTPATIENT)
Dept: PHYSICAL THERAPY | Age: 34
Setting detail: THERAPIES SERIES
Discharge: HOME | End: 2019-03-22
Payer: COMMERCIAL

## 2019-03-22 PROCEDURE — 97110 THERAPEUTIC EXERCISES: CPT

## 2019-03-22 PROCEDURE — 97012 MECHANICAL TRACTION THERAPY: CPT

## 2019-03-29 ENCOUNTER — HOSPITAL ENCOUNTER (OUTPATIENT)
Dept: PHYSICAL THERAPY | Age: 34
Setting detail: THERAPIES SERIES
Discharge: HOME | End: 2019-03-29
Payer: COMMERCIAL

## 2019-03-29 PROCEDURE — 97110 THERAPEUTIC EXERCISES: CPT

## 2019-03-29 PROCEDURE — 97012 MECHANICAL TRACTION THERAPY: CPT

## 2019-04-05 ENCOUNTER — HOSPITAL ENCOUNTER (OUTPATIENT)
Dept: PHYSICAL THERAPY | Age: 34
Setting detail: THERAPIES SERIES
Discharge: HOME | End: 2019-04-05
Payer: COMMERCIAL

## 2019-04-05 PROCEDURE — 97110 THERAPEUTIC EXERCISES: CPT

## 2019-04-05 PROCEDURE — 97012 MECHANICAL TRACTION THERAPY: CPT

## 2019-04-05 NOTE — FLOWSHEET NOTE
[x] [facility]  [address]  [phone]  F: [phone]        Physical Therapy Daily Treatment Note    Date:  2019  Patient Name:  Roman Ricketts    :  1985  MRN: [id number]  Physician: Alexa Freedman MD                                  Insurance: [facility]  Medical Diagnosis: Neck pain, right arm weakness                          Rehab Codes: M54.2, M25.60, M62.830, R29.3, R53.1    Onset Date: 2018                        Next 's appt: 19  Visit# / total visits:  10/12   Cancels/No Shows: 0/0    Subjective:    Pain:  [x] Yes  [] No Location:  R cervical/ R shoulder Pain Rating: (0-10 scale) 1/10 neck pain (1/10 back pain)  Pain altered Tx:  [x] No  [] Yes  Action:  Comments: Neck muscles feel better. Still having pinched nerve pain in neck down to shoulders. Objective:  Modalities: cervical traction min 5#, max 14#, 30 sec on, 10 sec off, 50% pull speed, 3 steps, for 15 minutes  Precautions:  Exercises:  Exercise Reps/ Time Weight/ Level Comments    Manual     Myofascial release to R thoracic paraspinals; traction/manual stretching    UBE 2/2 L 2.0 Increased level  x          Standing       Cane rotation frontal pl  PNF chops  Chest press  Bar rotation  Trunk rotation  10x ea  7' WB  5 lb bar  5 Lb bar  7' WB  5 lb Patient in mini squat with abd iso.   Cane in B hands with elbow extension and rotating cane in frontal plane  Physio ball added 3.8 Increased WB 3/15/19 x   Wall squats with UE 10x 7\" ball Patient completes wall squat with 7\" weighted ball with arms in extension x   Wall squats with UE toss 10x 7\" ball Patient completes wall squat while tossing and catching ball with arms extended x                     Supine          Cervical rotation        Cervical retraction with holds        Cane   Increased weight 3.8    Flexion 20x 5 lb  x   Chest press 20x 5 lb  x   Horiz abd 20x 5 lb  x   ER/IR 20x 5 lb Elbows extended like chest press with rotation x   Twist 20x 5 lb Added 3/15/19 x              Prone          Child's pose with R arm wrapped under chest         Prone scap retraction        Scap depression/shoulder ext       Prone on elbows scap protraction       Prone shoulder flexion 15x  Added 2.22 x   Prone donkey kicks 15x  Added 2.22 x   Prone Bird Dogs 15xea   Added 2.22 x   Prone opposite UE/LE 15x   x              Tband          ER/IR rotation 20x ea Lime green  x   Ext 20x Blue  x   Rows 20x Blue  x   Triceps 20x Blue  x                 Flex/ext iso walk out     NEXT; Maintain retracted/depressed scap    ER eccentric release   Ruelas TB NEXT; LUE helps achieve positon, RUE only with eccentric control                 Other:     Specific Instructions for next treatment: See above exercise log; progress scapular strengthening and R shoulder strength including weightbearing activities as able, with goal to return to higher-intensity weight lifting    Treatment Charges: Mins Units   []  Modalities IFC/HP     [x]  Ther Exercise 38 3   []  Manual Therapy     []  Ther Activities     []  Aquatics     []  Vasocompression     [x]  Other-traction 15 1   Total Treatment time 53 mins 4       Assessment: [x] Progressing toward goals. Traction first this date. Completed exercises per log this date to increase cervical/thoracic strength. [] No change. [] Other:    Problems:    [x] ? Pain:  [x] ? ROM: impaired cervical ext/L rotation, sidebend; decreased R shoulder flexion AROM  [x] ? Strength: R shoulder all planes (magda. Flex, IR, ER)  [x] ? Function: pain with all RUE lifting, magda overhead   [] Other:     STG: (to be met in 12 treatments)  1. ? Pain:   a. Pt will report 0/10 pain in neck and shoulder at rest, and no greater than 3/10 pain with typical activity and ADLs. b. Pt will report at least 75% improvement in condition as compared to initial evaluation. 2. ? ROM:  a.   Pt will demonstrate symmetrical shoulder flexion/abduction with no pain reported at end range. b. Pt will demonstrate the following cervical AROM measurements to improve mobility and function regarding neck: 45deg ext, bilateral rotation to at least 60deg, WNL cervical flexion; all with no more than minimal pain at end range. 3. ? Strength:   a. Pt will demonstrate at least 4+/5 strength in R shoulder IR/ER/flexion to improve lifting of laundry and children at home without pain. b. Pt will demonstrate improved deep cervical neck flexor strength as evidenced by ability to maintain neutral posture in sitting for majority of session. 4. ? Function:   a. Pt will report no greater than 15% impaired on NDI in order to demonstrate improved function. b. 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 5. Independent with Home Exercise Programs                  Patient goals: return to high-intensity body weight exercises, no pain with gymastics spotting for work      Rehab Potential:  [x] Good  [] Fair  [] Poor   Suggested Professional Referral:  [x] No  [] Yes:   Barriers to Goal Achievement[de-identified]  [] No  [x] Yes: chronicity of condition   Domestic Concerns:  [x] No  [] Yes:      Pt. Education:  [x] Yes  [] No  [x] Reviewed Prior HEP/Ed  Method of Education: [x] Verbal  [x] Demo  [] Written  Comprehension of Education: Physio ball with PNF cuts/trunk rotation/ chest press  [x] Verbalizes understanding. [x] Demonstrates understanding. [] Needs review. [x] Demonstrates/verbalizes HEP/Ed previously given. Good recall of exercises      Plan: [x] Continue per plan of care.    [] Other:      Time In: 3583    Time Out:  5146    Electronically signed by:  Wen Newman PTA

## 2019-04-08 ENCOUNTER — OFFICE VISIT (OUTPATIENT)
Dept: NEUROLOGY | Age: 34
End: 2019-04-08
Payer: COMMERCIAL

## 2019-04-08 VITALS
DIASTOLIC BLOOD PRESSURE: 77 MMHG | SYSTOLIC BLOOD PRESSURE: 138 MMHG | WEIGHT: 140 LBS | BODY MASS INDEX: 23.9 KG/M2 | HEIGHT: 64 IN | HEART RATE: 79 BPM

## 2019-04-08 DIAGNOSIS — G44.221 CHRONIC TENSION-TYPE HEADACHE, INTRACTABLE: ICD-10-CM

## 2019-04-08 DIAGNOSIS — R20.2 NUMBNESS AND TINGLING IN RIGHT HAND: ICD-10-CM

## 2019-04-08 DIAGNOSIS — R20.0 NUMBNESS AND TINGLING IN RIGHT HAND: ICD-10-CM

## 2019-04-08 DIAGNOSIS — M54.2 NECK PAIN: ICD-10-CM

## 2019-04-08 DIAGNOSIS — M62.838 MUSCLE SPASM: ICD-10-CM

## 2019-04-08 DIAGNOSIS — M79.2 RADICULAR PAIN IN RIGHT ARM: Primary | ICD-10-CM

## 2019-04-08 DIAGNOSIS — G43.109 MIGRAINE WITH AURA AND WITHOUT STATUS MIGRAINOSUS, NOT INTRACTABLE: ICD-10-CM

## 2019-04-08 PROCEDURE — 99214 OFFICE O/P EST MOD 30 MIN: CPT | Performed by: PSYCHIATRY & NEUROLOGY

## 2019-04-08 RX ORDER — TIZANIDINE 4 MG/1
TABLET ORAL
Qty: 30 TABLET | Refills: 3 | Status: SHIPPED | OUTPATIENT
Start: 2019-04-08 | End: 2019-07-08 | Stop reason: SDUPTHER

## 2019-04-08 RX ORDER — BUTALBITAL, ACETAMINOPHEN AND CAFFEINE 50; 325; 40 MG/1; MG/1; MG/1
TABLET ORAL
Qty: 40 TABLET | Refills: 0 | Status: SHIPPED | OUTPATIENT
Start: 2019-04-08 | End: 2019-07-08 | Stop reason: SDUPTHER

## 2019-04-08 RX ORDER — GABAPENTIN 400 MG/1
CAPSULE ORAL
Qty: 60 CAPSULE | Refills: 3 | Status: SHIPPED | OUTPATIENT
Start: 2019-04-08 | End: 2019-07-08 | Stop reason: SDUPTHER

## 2019-04-08 NOTE — PROGRESS NOTES
NEUROLOGY FOLLOW-UP  Patient Name:  Chaparro Flower  :   1985  Clinic Visit Date: 2019    I saw Ms. Chaparro Flower in follow-up in the office today in continuation of neurologic care. She is a 36 y.o. right handed  female with chronic tension-type headaches superimposed on migraine attacks. She has had abnormal EMG and MRI cervical spine demonstrating right C6/C7 cervical radiculopathy. She had OT with improvement especially with cervical traction. She has noticed a partial relief of radiating pain with the cervical traction. She denied upper extremity weakness and cervical dysfunction. She also stated \"gabapentin and tizanidine helping for headaches also\". She uses Maxalt prn. She had hx of chronic daily headaches with \"headaches for at least 15 years since teenage years\". She has been suffering from tension type headaches with superimposed intermittent migrainous attacks; has been on gabapentin 400 bid, tizanidine nightly for headache prophylaxis. She is also on Maxalt alternating with butalbital for rescue therapy. She is also on Aimovig 70mg once monthly injn preventive therapy. She comes to the clinic stating that she had significant improvement in migraine attacks and she had only two times in the last 3 months; requiring her to take Maxalt. Her usual daily headaches persisted \"not horrible but annoying\". No photophobia, no phonophobia and no nausea and no vomiting with those daily headaches. She also stated that she has been having neck pain with right upper extremity weakness for greater than one month duration. She also has limitation of neck movement. She also admits to having intermittent pain radiating down right upper extremity. Denies bladder dysfunction but admits to having clumsiness with occasional gait disturbances with ongoing neck pain and right shoulder pain. She has tried NSAIDs with no relief.     She always has headache on a daily basis and it is visit. Allergies: Catherine Kwan has No Known Allergies. Past Medical History:   Diagnosis Date    Headache     Psoriasis        Past Surgical History:   Procedure Laterality Date     SECTION      x2    CYST REMOVAL      breast    TUBAL LIGATION       Social History: Catherine Kwan  reports that she has never smoked. She has never used smokeless tobacco. She reports that she drinks alcohol. She reports that she does not use drugs. Family History   Problem Relation Age of Onset    High Blood Pressure Father     Migraines Sister     Migraines Paternal Grandmother     Heart Disease Paternal Grandmother     Cancer Paternal Grandfather         brain       On exam: Blood pressure 138/77, pulse 79, height 5' 4\" (1.626 m), weight 140 lb (63.5 kg), not currently breastfeeding. GENERAL  Appears comfortable and in no distress   HEENT  NC/ AT   NECK  Supple and no bruits heard   MENTAL STATUS:  Alert, oriented, intact memory, no confusion, normal speech, normal language, no hallucination or delusion; Appropriate affect. CRANIAL NERVES: II     -      PERRLA; VA 20/20 OU., Visual fields intact to confrontation  III,IV,VI -  EOMs full, no afferent defect, no  KEHINDE, no ptosis  V     -     Normal facial sensation  VII    -     Normal facial symmetry  VIII   -     Intact hearing  IX,X -     Symmetrical palate  XI    -     Symmetrical shoulder shrug  XII   -     Midline tongue, no atrophy    MOTOR FUNCTION:  significant for good strength of grade 5/5 in bilateral proximal and distal muscle groups of both upper and lower extremities with normal bulk, normal tone and no involuntary movements, no tremor; Spurling's sign positive.     SENSORY FUNCTION:  Normal touch, normal pin, normal vibration, normal proprioception   CEREBELLAR FUNCTION:  Intact fine motor control over upper limbs   REFLEX FUNCTION:  Symmetric and brisk DTRs with no sheldon sign; no Babinski sign   STATION and GAIT  Normal station, normal gait       Diagnostic data reviewed with the patient:   MRI Brain (w/wo) (1/31/17): No intracranial abnormality. Normal MRI of the brain. CBC:   No results found for: WBC, HGB, PLT   BMP:   No results found for: NA, K, GLUCOSE, CALCIUM      Lab Results   Component Value Date    CHOL 179 01/06/2017    HDL 57 01/06/2017    TRIG 71 01/06/2017     MRI cervical spine 1/18/19: Multiple levels with small disc protrusions in cervical spine without significant canal stenosis or neural foraminal narrowing. No demyelinating plaques in cervical cord. No abnormal enhancement with IV contrast.      EMG Rt UE (1/24/19): There is electrophysiologic evidence of mild right C6/C7 cervical radiculopathy. This study also demonstrated subtle median neuropathy at right wrist suggestive of right carpal tunnel syndrome of mild severity. Impression and Plan: Ms. Silvia Stewart is a 36 y.o. female with   Cervical Radiculopathy with significant relief in neck pain with cervical traction therapy with OT; recommend to continue the same. Also d/w patient regarding spine surgery referral; she is wishing to go to pain clinic; will refer her to pain clinic. Doesn't want any steroids. Also to continue tizanidine, gabapentin with the butalbital.     Chronic daily headache with tension type headaches with superimposed migrainous attacks: Migraine headaches significantly improved; continue Maxalt prn. Hx of intolerance with steroids with increased anxiety  Continue gabapentin 400 mg bid to be taken at 9 am and 5 pm along with Tizanidine nightly for migraine  headache prophylaxis and to continue Maxalt alternating with butalbital for severe attacks. Zofran prn nausea. Also discussed about magnesium oxide 400mg qd for headache prophylaxis and she has been taking it on her own. Also to continue migraine preventive med, Aimovig (Erenumab) 70mg once monthly injection.      Medication Counseling: risks and

## 2019-04-12 ENCOUNTER — HOSPITAL ENCOUNTER (OUTPATIENT)
Dept: PHYSICAL THERAPY | Age: 34
Setting detail: THERAPIES SERIES
Discharge: HOME | End: 2019-04-12
Payer: COMMERCIAL

## 2019-04-12 ENCOUNTER — HOSPITAL ENCOUNTER (OUTPATIENT)
Age: 34
Discharge: HOME | End: 2019-04-14
Payer: COMMERCIAL

## 2019-04-12 ENCOUNTER — INITIAL CONSULT (OUTPATIENT)
Dept: PAIN MANAGEMENT | Age: 34
End: 2019-04-12
Payer: COMMERCIAL

## 2019-04-12 ENCOUNTER — HOSPITAL ENCOUNTER (OUTPATIENT)
Dept: GENERAL RADIOLOGY | Age: 34
Discharge: HOME | End: 2019-04-14
Payer: COMMERCIAL

## 2019-04-12 VITALS
HEIGHT: 64 IN | BODY MASS INDEX: 23.73 KG/M2 | DIASTOLIC BLOOD PRESSURE: 100 MMHG | SYSTOLIC BLOOD PRESSURE: 143 MMHG | WEIGHT: 139 LBS | HEART RATE: 84 BPM | OXYGEN SATURATION: 97 %

## 2019-04-12 DIAGNOSIS — M54.12 CERVICAL RADICULOPATHY: ICD-10-CM

## 2019-04-12 DIAGNOSIS — M48.02 CERVICAL SPINAL STENOSIS: Primary | ICD-10-CM

## 2019-04-12 DIAGNOSIS — R93.89 ABNORMAL MRI: ICD-10-CM

## 2019-04-12 DIAGNOSIS — M54.12 CERVICAL RADICULITIS: ICD-10-CM

## 2019-04-12 DIAGNOSIS — M79.2 RADICULAR PAIN IN RIGHT ARM: Primary | ICD-10-CM

## 2019-04-12 DIAGNOSIS — R29.898 RIGHT ARM WEAKNESS: ICD-10-CM

## 2019-04-12 DIAGNOSIS — M48.02 CERVICAL SPINAL STENOSIS: ICD-10-CM

## 2019-04-12 PROCEDURE — 97012 MECHANICAL TRACTION THERAPY: CPT

## 2019-04-12 PROCEDURE — 72040 X-RAY EXAM NECK SPINE 2-3 VW: CPT

## 2019-04-12 PROCEDURE — 99244 OFF/OP CNSLTJ NEW/EST MOD 40: CPT | Performed by: ANESTHESIOLOGY

## 2019-04-12 PROCEDURE — 97110 THERAPEUTIC EXERCISES: CPT

## 2019-04-12 PROCEDURE — G0283 ELEC STIM OTHER THAN WOUND: HCPCS

## 2019-04-12 ASSESSMENT — ENCOUNTER SYMPTOMS
VOMITING: 0
BACK PAIN: 1
BLOOD IN STOOL: 0
RESPIRATORY NEGATIVE: 1
ABDOMINAL PAIN: 0
DIARRHEA: 0
RECTAL PAIN: 0
ALLERGIC/IMMUNOLOGIC NEGATIVE: 1
ANAL BLEEDING: 0
ABDOMINAL DISTENTION: 0
CONSTIPATION: 1
NAUSEA: 0
EYES NEGATIVE: 1

## 2019-04-12 NOTE — COMMUNICATION BODY
independently  Images were shown to the patient  Pertinent findings discussed in detail with patient      Advised patient to wear a soft cervical collar until surgical evaluation and completion of the flexion-extension film      Orders Placed This Encounter   Procedures    XR CERVICAL SPINE FLEXION AND EXTENSION     Standing Status:   Future     Standing Expiration Date:   4/12/2020     Order Specific Question:   Reason for exam:     Answer:   neck and arm pain / cervical stenoses    MARIANA Duarte MD, Orthopedic Surgery, [place]     Referral Priority:   Routine     Referral Type:   Eval and Treat     Referral Reason:   Specialty Services Required     Referred to Provider:   Ann-Marie Chaidez MD     Requested Specialty:   Orthopedic Surgery     Number of Visits Requested:   1    ND Rolley Modest DX/THER SBST INTRLMNR CRV/THRC W/IMG GDN     cervical rony C7-T1     Standing Status:   Future     Standing Expiration Date:   7/12/2019     No orders of the defined types were placed in this encounter. Controlled Substances Monitoring:     RX Monitoring 4/12/2019   Attestation The Prescription Monitoring Report for this patient was reviewed today.    Chronic Pain Routine Monitoring No signs of potential drug abuse or diversion identified: otherwise, see note documentation

## 2019-04-12 NOTE — LETTER
[facility] [address]   [place] [address]  Phone: [phone]  Fax: [phone]    Adeel Izquierdo MD        April 12, 2019     Alanna Vincent MD  [place] [facility] [id number]    Patient: Joseph Marcos  MR Number: [id number]  YOB: 1985  Date of Visit: 4/12/2019    Dear Dr. Alanna Vincent: Thank you for the request for consultation for Natividad Romano to me for the evaluation of neck pain and cervical radiculitis. Below are the relevant portions of my assessment and plan of care. Assessment: This is a 78-year-old woman with the more than 1 year history of neck pain. No particular injury associated with the onset of her symptoms. Symptoms progressively worsened over 1 year. She states she was having a constant stiffness and aching pain in her neck which have progressively worsened over 1 year to involve the right arm. Pain is now radiating down from neck and right arm all the way to the fingers. Associated symptoms include right arm numbness tingling and weakness. She denies any changes in bladder or bowel control. No history of fever chills or weight loss. No previous cervical spine surgical history  No previous cervical spine injection history  Patient did physical therapy in 2018 with short-term improvement in pain. Pain is then returned shortly after therapy  Patient had cervical spine MRI in January 2019, report is available in Epic  She has not been evaluated by neurosurgeons or spine surgeon    Pain is constant aching throbbing in neck and sharp shooting burning sensation in arm.   Average pain intensity 6/10  It aggravates with any neck movement and arm movement  Nothing seems to alleviate the pain  Pain is significantly interfering with daily life activities and work    EXAMINATION: MRI OF THE CERVICAL SPINE WITHOUT AND WITH CONTRAST  1/18/2019 6:44 pm: If you have questions, please do not hesitate to call me. I look forward to following Ayo Fields along with you.     Sincerely,        Anita Masterson MD

## 2019-04-12 NOTE — PROGRESS NOTES
Subjective:      Patient ID: Art Meier is a 36 y.o. female. HPI   Chief Complaint   Patient presents with    Consultation    Migraine    Neck Pain     Requesting physician for the evaluation of Art Meier 1985:      Pain History: This is a 58-year-old woman with the more than 1 year history of neck pain. No particular injury associated with the onset of her symptoms. Symptoms progressively worsened over 1 year. She states she was having a constant stiffness and aching pain in her neck which have progressively worsened over 1 year to involve the right arm. Pain is now radiating down from neck and right arm all the way to the fingers. Associated symptoms include right arm numbness tingling and weakness. She denies any changes in bladder or bowel control. No history of fever chills or weight loss. No previous cervical spine surgical history  No previous cervical spine injection history  Patient did physical therapy in 2018 with short-term improvement in pain. Pain is then returned shortly after therapy  Patient had cervical spine MRI in January 2019, report is available in Epic  She has not been evaluated by neurosurgeons or spine surgeon    Pain is constant aching throbbing in neck and sharp shooting burning sensation in arm. Average pain intensity 6/10  It aggravates with any neck movement and arm movement  Nothing seems to alleviate the pain  Pain is significantly interfering with daily life activities and work    Pain score today  6  1. Location:Neck   2. Radiation: down back to right shoulder blade  3. Character: Numb,sharp,burning  5. Duration: 1 years  6. Onset: 1 year  7. Did an injury cause pain: unsure  8. Aggravating factors: using right arm  9. Alleviating factors: nothing  10.  Associated symptoms (numbness / tingling / weakness):  Yes all  -Where at: right arm and fingers  -Down into finger tips or toes (specify which finger or toes): right hand and lat 3 finger, middle ring and pinky  -constant or intermitting: constant  11. Red Flags: (weight loss / chills / loss of bladder or bowel control):none    Previous management history  1. Previous diagnostic workup: (Imaging/EMG)   CT, MRI, or Xray:  MRI  What part of the body: neck  What facility did they have it [facility]: [facility]  What year or specific date:  1/18/2019  EMG:  yes    2. Previous non interventional treatments tried:  chiropractor or physical therapy: PT   What part of the body neck  What facility was it done [facility]: [email or web address]  How long ago was it last tried: current  Did it work: Yes  Did they complete it:Still in therapy    3. Previous Medications tried  NSAID's: yes  Neurontin: yes  Lyrica: no  Trycyclic antidepressant (Ellavil / Pamelor ): no  Cymbalta: no  Opioids (Ultram / Vicodin / Percocet / Morphine / Dilaudid / Oramorph/ Fentanyl etc.):none  Last Pain medication taken (name of med and date): 4/11/2019, ibuprofen     4. Previous Interventional pain procedures tried:  What kind of injection: na  Who did the injection: na  did the injection help: na  Last time injection was done:N/A    5. Previous surgeries for pain  What part of the body did they have the surgery: na  What physician did the surgery:na  What Facility did they have the surgery done:na  Date of Surgery:N/A    Social History:  Marital status:   Employment History:   Working  Yes  Full time Or Part time: Fulltime  Disability  No   Legal Issues related to pain complaint: No     Pain Disability Index score : 34      Informant: patient        Review of Systems   Constitutional: Positive for fatigue. Negative for activity change, appetite change, chills, diaphoresis, fever and unexpected weight change. HENT: Negative. Eyes: Negative. Respiratory: Negative. Cardiovascular: Negative. Gastrointestinal: Positive for constipation.  Negative for abdominal distention, abdominal pain, anal bleeding, blood in stool, diarrhea, nausea, rectal pain and Occupational History    None   Social Needs    Financial resource strain: None    Food insecurity:     Worry: None     Inability: None    Transportation needs:     Medical: None     Non-medical: None   Tobacco Use    Smoking status: Never Smoker    Smokeless tobacco: Never Used   Substance and Sexual Activity    Alcohol use: Yes     Alcohol/week: 0.0 oz     Comment: ocassionally    Drug use: No    Sexual activity: Yes     Partners: Male   Lifestyle    Physical activity:     Days per week: None     Minutes per session: None    Stress: None   Relationships    Social connections:     Talks on phone: None     Gets together: None     Attends Congregational service: None     Active member of club or organization: None     Attends meetings of clubs or organizations: None     Relationship status: None    Intimate partner violence:     Fear of current or ex partner: None     Emotionally abused: None     Physically abused: None     Forced sexual activity: None   Other Topics Concern    None   Social History Narrative    None     Family History   Problem Relation Age of Onset    High Blood Pressure Father     Migraines Sister     Migraines Paternal Grandmother     Heart Disease Paternal Grandmother     Cancer Paternal Grandfather         brain     Social History     Socioeconomic History    Marital status:      Spouse name: None    Number of children: None    Years of education: None    Highest education level: None   Occupational History    None   Social Needs    Financial resource strain: None    Food insecurity:     Worry: None     Inability: None    Transportation needs:     Medical: None     Non-medical: None   Tobacco Use    Smoking status: Never Smoker    Smokeless tobacco: Never Used   Substance and Sexual Activity    Alcohol use:  Yes     Alcohol/week: 0.0 oz     Comment: ocassionally    Drug use: No    Sexual activity: Yes     Partners: Male   Lifestyle    Physical activity: Days per week: None     Minutes per session: None    Stress: None   Relationships    Social connections:     Talks on phone: None     Gets together: None     Attends Pentecostalism service: None     Active member of club or organization: None     Attends meetings of clubs or organizations: None     Relationship status: None    Intimate partner violence:     Fear of current or ex partner: None     Emotionally abused: None     Physically abused: None     Forced sexual activity: None   Other Topics Concern    None   Social History Narrative    None       Objective:   Physical Exam   Constitutional: She is oriented to person, place, and time. She appears well-developed and well-nourished. No distress. HENT:   Head: Normocephalic and atraumatic. Eyes: Pupils are equal, round, and reactive to light. Conjunctivae and EOM are normal. Right eye exhibits no discharge. Left eye exhibits no discharge. Cardiovascular: Normal rate, regular rhythm and normal heart sounds. Pulmonary/Chest: Effort normal and breath sounds normal. No stridor. No respiratory distress. Abdominal: Soft. Bowel sounds are normal. She exhibits no distension. Musculoskeletal: She exhibits no edema or deformity. Cervical back: She exhibits decreased range of motion, tenderness, pain and spasm. She exhibits no bony tenderness, no edema, no deformity and no laceration. Right upper arm: She exhibits tenderness. Arms:  Neurological: She is alert and oriented to person, place, and time. She displays no atrophy, no tremor and normal reflexes. No cranial nerve deficit or sensory deficit. She exhibits normal muscle tone. Coordination and gait normal.   Reflex Scores:       Tricep reflexes are 3+ on the right side and 2+ on the left side. Bicep reflexes are 3+ on the right side and 2+ on the left side.   Motor strength assessment in major muscle group in both upper extremities revealed 4/5 strength in right biceps and right wrist extensors   Skin: Skin is warm and dry. No rash noted. No erythema. Psychiatric: She has a normal mood and affect. Her behavior is normal. Judgment and thought content normal.   Nursing note and vitals reviewed. Assessment: This is a 45-year-old woman with the more than 1 year history of neck pain. No particular injury associated with the onset of her symptoms. Symptoms progressively worsened over 1 year. She states she was having a constant stiffness and aching pain in her neck which have progressively worsened over 1 year to involve the right arm. Pain is now radiating down from neck and right arm all the way to the fingers. Associated symptoms include right arm numbness tingling and weakness. She denies any changes in bladder or bowel control. No history of fever chills or weight loss. No previous cervical spine surgical history  No previous cervical spine injection history  Patient did physical therapy in 2018 with short-term improvement in pain. Pain is then returned shortly after therapy  Patient had cervical spine MRI in January 2019, report is available in Epic  She has not been evaluated by neurosurgeons or spine surgeon    Pain is constant aching throbbing in neck and sharp shooting burning sensation in arm. Average pain intensity 6/10  It aggravates with any neck movement and arm movement  Nothing seems to alleviate the pain  Pain is significantly interfering with daily life activities and work    EXAMINATION: MRI OF THE CERVICAL SPINE WITHOUT AND WITH CONTRAST  1/18/2019 6:44 pm:     C3-C4: Small broad-based central disc protrusion. No spinal canal stenosis or neural foraminal narrowing. C4-C5: Small broad-based central disc protrusion. No spinal canal stenosis or neural foraminal narrowing. C5-C6: Small broad-based central disc protrusion. No spinal canal stenosis or neural foraminal narrowing. C6-C7: Small right paracentral disc protrusion. No spinal canal stenosis.  No neural foraminal narrowing. 1. Cervical spinal stenosis    2. Cervical radiculitis    3. Abnormal MRI            Plan:      MRI cervical spine January 2019  Report was reviewed  Images were reviewed independently  Images were shown to the patient  Pertinent findings discussed in detail with patient      Advised patient to wear a soft cervical collar until surgical evaluation and completion of the flexion-extension film      Orders Placed This Encounter   Procedures    XR CERVICAL SPINE FLEXION AND EXTENSION     Standing Status:   Future     Standing Expiration Date:   4/12/2020     Order Specific Question:   Reason for exam:     Answer:   neck and arm pain / cervical stenoses    MARIANA Angel MD, Orthopedic Surgery, [place]     Referral Priority:   Routine     Referral Type:   Eval and Treat     Referral Reason:   Specialty Services Required     Referred to Provider:   Soledad Veronica MD     Requested Specialty:   Orthopedic Surgery     Number of Visits Requested:   1    PA Todd Kel Swetha 85 DX/THER SBST INTRLMNR CRV/THRC W/IMG GDN     cervical rony C7-T1     Standing Status:   Future     Standing Expiration Date:   7/12/2019     No orders of the defined types were placed in this encounter. Controlled Substances Monitoring:     RX Monitoring 4/12/2019   Attestation The Prescription Monitoring Report for this patient was reviewed today.    Chronic Pain Routine Monitoring No signs of potential drug abuse or diversion identified: otherwise, see note documentation             Arnol Hunter MD

## 2019-04-12 NOTE — FLOWSHEET NOTE
UE  7\" ball Patient completes wall squat with 7\" weighted ball with arms in extension    Wall squats with UE toss  7\" ball Patient completes wall squat while tossing and catching ball with arms extended                      Supine          Cervical rotation 8x    x   Cervical retraction with holds 10x 5 sec   x   Cane   Increased weight 3.8    Flexion 10x 1 lb  x   Chest press 10x 1 lb  x   Horiz abd 10x 1 lb  x   ER/IR 10x 1 lb Elbows extended like chest press with rotation    Twist 10x 1 lb Added 3/15/19 x              Prone         Child's pose with R arm wrapped under chest         Prone scap retraction        Scap depression/shoulder ext       Prone on elbows scap protraction       Prone shoulder flexion   Added 2.22    Prone donkey kicks   Added 2.22    Prone Bird Dogs    Added 2.22    Prone opposite UE/LE                 Tband         ER/IR rotation  Lime green     Ext  Blue     Rows  Blue     Triceps  Blue                   Flex/ext iso walk out     NEXT; Maintain retracted/depressed scap    ER eccentric release   Ruelas TB NEXT; LUE helps achieve positon, RUE only with eccentric control                 Other:     Specific Instructions for next treatment: See above exercise log; progress scapular strengthening and R shoulder strength including weightbearing activities as able, with goal to return to higher-intensity weight lifting    Treatment Charges: Mins Units   [x]  Modalities IFC/HP 15/15 0/1   [x]  Ther Exercise 28 2   []  Manual Therapy     []  Ther Activities     []  Aquatics     []  Vasocompression     [x]  Other-traction 15 1   Total Treatment time 28 mins 4       Assessment: [x] Progressing toward goals. Initiated cervical traction to help with pain with no change in pain/numnbess. Exercises per log as patient tolerated this date, supine only. Ended with hot pack and IFC to also help with pain. No change in pain/numbness after treatment. [] No change. [] Other:    Problems:    [x] ? Pain:  [x] ? ROM: impaired cervical ext/L rotation, sidebend; decreased R shoulder flexion AROM  [x] ? Strength: R shoulder all planes (magda. Flex, IR, ER)  [x] ? Function: pain with all RUE lifting, magda overhead   [] Other:     STG: (to be met in 12 treatments)  1. ? Pain:   a. Pt will report 0/10 pain in neck and shoulder at rest, and no greater than 3/10 pain with typical activity and ADLs. b. Pt will report at least 75% improvement in condition as compared to initial evaluation. 2. ? ROM:  a. Pt will demonstrate symmetrical shoulder flexion/abduction with no pain reported at end range. b. Pt will demonstrate the following cervical AROM measurements to improve mobility and function regarding neck: 45deg ext, bilateral rotation to at least 60deg, WNL cervical flexion; all with no more than minimal pain at end range. 3. ? Strength:   a. Pt will demonstrate at least 4+/5 strength in R shoulder IR/ER/flexion to improve lifting of laundry and children at home without pain. b. Pt will demonstrate improved deep cervical neck flexor strength as evidenced by ability to maintain neutral posture in sitting for majority of session. 4. ? Function:   a. Pt will report no greater than 15% impaired on NDI in order to demonstrate improved function. b. 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   5. Independent with Home Exercise Programs                  Patient goals: return to high-intensity body weight exercises, no pain with gymastics spotting for work      Rehab Potential:  [x] Good  [] Fair  [] Poor   Suggested Professional Referral:  [x] No  [] Yes:   Barriers to Goal Achievement[de-identified]  [] No  [x] Yes: chronicity of condition   Domestic Concerns:  [x] No  [] Yes:      Pt. Education:  [x] Yes  [] No  [x] Reviewed Prior HEP/Ed  Method of Education: [x] Verbal  [x] Demo  [] Written  Comprehension of Education: Physio ball with PNF cuts/trunk rotation/ chest press  [x] Verbalizes understanding. [x] Demonstrates understanding. [] Needs review. [x] Demonstrates/verbalizes HEP/Ed previously given. Good recall of exercises      Plan: [x] Continue per plan of care.    [] Other:      Time In: 1302    Time Out: 8153     Electronically signed by:  Mauricio Naylor PTA

## 2019-04-17 ENCOUNTER — HOSPITAL ENCOUNTER (OUTPATIENT)
Dept: PHYSICAL THERAPY | Age: 34
Setting detail: THERAPIES SERIES
Discharge: HOME | End: 2019-04-17
Payer: COMMERCIAL

## 2019-04-17 PROCEDURE — 97110 THERAPEUTIC EXERCISES: CPT

## 2019-04-17 PROCEDURE — 97012 MECHANICAL TRACTION THERAPY: CPT

## 2019-04-17 NOTE — FLOWSHEET NOTE
[x] [facility] &  Therapy  [address]  [phone]  F: [phone]     Physical Therapy Daily Treatment Note    Date:  2019  Patient Name:  Pascual Weber    :  1985  MRN: [id number]  Physician: Mary Szymanski MD                                  Insurance: [employer]  Medical Diagnosis: Neck pain, right arm weakness                          Rehab Codes: M54.2, M25.60, M62.830, R29.3, R53.1    Onset Date: 2018                        Next 's appt: 19  Visit# / total visits:     Cancels/No Shows: 0/0    Subjective:    Pain:  [x] Yes  [] No Location:  R cervical/ R shoulder Pain Rating: (0-10 scale) 6/10 neck pain (6/10 back pain)  Pain altered Tx:  [x] No  [] Yes  Action:  Comments: Seen doctor Dr. Tye Jackman yesterday and wants to do another MRI and do surgery. No change in pain rating. Arrives wearing soft collar neck brace as requested by doctors. States that cervical traction helped after last session    Objective:  Modalities: First cervical traction min 5#, max 12#, 30 sec on, 10 sec off, 50% pull speed, 3 steps, for 15 minutes. After treatment, hot pack (cervical) and IFC (opiate) to neck for 15 minutes seated--HELD this date, therapist error.   Precautions:  Exercises:  Exercise Reps/ Time Weight/ Level Comments    Manual     Myofascial release to R thoracic paraspinals; traction/manual stretching    UBE 6 mins L 2.0 Increased level  x          Standing       Cane rotation frontal pl  PNF chops  Chest press  Bar rotation  Trunk rotation  10x  5 lb On mat table x   Wall squats with UE  7\" ball Patient completes wall squat with 7\" weighted ball with arms in extension    Wall squats with UE toss  7\" ball Patient completes wall squat while tossing and catching ball with arms extended                      Supine          Cervical rotation 8x       Cervical retraction with holds 10x 5 sec     Cane       Flexion 10x 1 lb x   Chest press 10x 1 lb  x   Horiz abd 10x 1 lb  x   ER/IR 10x 1 lb Elbows extended like chest press with rotation x   Twist 10x 1 lb  x              Prone         Child's pose with R arm wrapped under chest         Prone scap retraction        Scap depression/shoulder ext       Prone on elbows scap protraction       Prone shoulder flexion   Added 2.22    Prone donkey kicks   Added 2.22    Prone Bird Dogs    Added 2.22    Prone opposite UE/LE                 Tband         ER/IR rotation  Lime green     Ext  Blue     Rows  Blue     Triceps  Blue                   Flex/ext iso walk out     NEXT; Maintain retracted/depressed scap    ER eccentric release   Ruelas TB NEXT; LUE helps achieve positon, RUE only with eccentric control                 Other:     Specific Instructions for next treatment:     Treatment Charges: Mins Units   []  Modalities IFC/HP     [x]  Ther Exercise 32 2   []  Manual Therapy     []  Ther Activities     []  Aquatics     []  Vasocompression     [x]  Other-traction 15 1   Total Treatment time 47 mins 3       Assessment: [x] Progressing toward goals. Exercises as patient tolerated this date. Assessed goals this date. Continued traction to help decrease pain and numbness/tingling in right arm. [] No change. [] Other:    Problems:    [x] ? Pain:  [x] ? ROM: impaired cervical ext/L rotation, sidebend; decreased R shoulder flexion AROM  [x] ? Strength: R shoulder all planes (magda. Flex, IR, ER)  [x] ? Function: pain with all RUE lifting, magda overhead   [] Other:     STG: (to be met in 12 treatments)  1. ? Pain:   a. Pt will report 0/10 pain in neck and shoulder at rest, and no greater than 3/10 pain with typical activity and ADLs.--pain 6/10 at rest, 8-9/10 with activity. b. Pt will report at least 75% improvement in condition as compared to initial evaluation. --worse than when she started secondary to all of a sudden had increased pain. 2. ? ROM:  a.   Pt will demonstrate symmetrical shoulder flexion/abduction with no pain reported at end range. --symmetrical but has 8-9/10 pain with this  b. Pt will demonstrate the following cervical AROM measurements to improve mobility and function regarding neck: 45deg ext, bilateral rotation to at least 60deg, WNL cervical flexion; all with no more than minimal pain at end range. --unable to assess, patient now has to wear soft cervical collar   3. ? Strength:   a. Pt will demonstrate at least 4+/5 strength in R shoulder IR/ER/flexion to improve lifting of laundry and children at home without pain.--NOT MET, 4/5  b. Pt will demonstrate improved deep cervical neck flexor strength as evidenced by ability to maintain neutral posture in sitting for majority of session. --MET  4. ? Function:   a. Pt will report no greater than 15% impaired on NDI in order to demonstrate improved function. ---62%  b. 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 Independent with Home Exercise Programs--MET                  Patient goals: return to high-intensity body weight exercises, no pain with gymastics spotting for work      Rehab Potential:  [x] Good  [] Fair  [] Poor   Suggested Professional Referral:  [x] No  [] Yes:   Barriers to Goal Achievement[de-identified]  [] No  [x] Yes: chronicity of condition   Domestic Concerns:  [x] No  [] Yes:      Pt. Education:  [x] Yes  [] No  [x] Reviewed Prior HEP/Ed  Method of Education: [x] Verbal  [x] Demo  [] Written  Comprehension of Education: Physio ball with PNF cuts/trunk rotation/ chest press  [x] Verbalizes understanding. [x] Demonstrates understanding. [] Needs review. [x] Demonstrates/verbalizes HEP/Ed previously given. Good recall of exercises      Plan: [] Continue per plan of care. [x] Other: Discharge patient to [facility].        Time In: 8300    Time Out: 1053    Electronically signed by:  Robert Lamb PTA

## 2019-04-18 NOTE — DISCHARGE SUMMARY
[x] [facility]) [facility] &  Therapy  [address]  [phone]  F: [phone] [] [address]  [place]   [address]  P: [phone]  F: [phone] [] [place]  [address] [facility]  P: [phone]  F: [phone] [] [address]  [facility]   [address]   [place]: [phone]  F: [phone]     Physical Therapy Discharge Note    Date: 2019      Patient: Dennise Fernández  : 1985  MRN: [id number]   Physician: Jasmina Gómez MD                                  Insurance: [employer]  Medical Diagnosis: Neck pain, right arm weakness                          Rehab Codes: M54.2, M25.60, M62.830, R29.3, R53.1    Onset Date: 2018                        Next 's appt: 19  Visit# / total visits:                   Cancels/No Shows: 0/0  Date of initial visit: 19                Date of final visit: 19      Subjective:    Pain:  [x] Yes  [] No          Location:  R cervical/ R shoulder        Pain Rating: (0-10 scale) 6/10 neck pain (6/10 back pain)  Pain altered Tx:  [x] No  [] Yes  Action:  Comments: Seen doctor Dr. Magy Encinas yesterday and wants to do another MRI and do surgery. No change in pain rating. Arrives wearing soft collar neck brace as requested by doctors. States that cervical traction helped after last session      Assessment: Pt was demonstrating improvement in symptoms and function with physical therapy, however had an exacerbating incident while cutting meat at home that re-aggravated numbness and tingling down hand, and has high pain levels. Pt underwent x-ray that revealed mild anterolisthesis in c-spine with cervical flexion - plan is to seek consult from neurosurgery for next steps. Will be discharged at this time.       STG: (to be met in 12 treatments)  1. ? Pain:   a. Pt will report 0/10 pain in neck and shoulder at rest, and no greater than 3/10 pain with typical activity and ADLs.--pain 6/10 at rest, 8-9/10 with activity. b. Pt will report at least 75% improvement in condition as compared to initial evaluation. --worse than when she started secondary to all of a sudden had increased pain. 2. ? ROM:  a.  Pt will demonstrate symmetrical shoulder flexion/abduction with no pain reported at end range. --symmetrical but has 8-9/10 pain with this  b. Pt will demonstrate the following cervical AROM measurements to improve mobility and function regarding neck: 45deg ext, bilateral rotation to at least 60deg, WNL cervical flexion; all with no more than minimal pain at end range. --unable to assess, patient now has to wear soft cervical collar   3. ? Strength:   a. Pt will demonstrate at least 4+/5 strength in R shoulder IR/ER/flexion to improve lifting of laundry and children at home without pain.--NOT MET, 4/5  b. Pt will demonstrate improved deep cervical neck flexor strength as evidenced by ability to maintain neutral posture in sitting for majority of session. --MET  4. ? Function:   a. Pt will report no greater than 15% impaired on NDI in order to demonstrate improved function. ---62%  b. 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  Independent with Home Exercise Programs--MET        Treatment to Date:  [x] Therapeutic Exercise    [x] Modalities:  [] Therapeutic Activity    [] Ultrasound  [x] Electrical Stimulation  [] Gait Training     [] Massage       [x] Lumbar/Cervical Traction  [] Neuromuscular Re-education [x] Cold/hotpack [] Iontophoresis: 4 mg/mL  [x] Instruction in Home Exercise Program                     Dexamethasone Sodium  [] Manual Therapy             Phosphate 40-80 mAmin  [] Aquatic Therapy                   [] Vasocompression/    [] Other:             Game Ready    Discharge Status:     [] Pt recovered from conditions. Treatment goals were met. [] Pt received maximum benefit. No further therapy indicated at this time. [] Pt to continue exercise/home instructions independently. [] Therapy interrupted due to:    [] Pt has 2 or more no shows/cancels, is discontinued per our policy. [x] Pt has completed prescribed number of treatment sessions. [x] Other: Pt will likely undergo surgery to improve condition; will be discharged from physical therapy at this time. Electronically signed by Carmela Pineda PT on 4/18/2019 at 12:15 PM      If you have any questions or concerns, please don't hesitate to call.   Thank you for your referral.

## 2019-04-19 ENCOUNTER — TELEPHONE (OUTPATIENT)
Dept: PAIN MANAGEMENT | Age: 34
End: 2019-04-19

## 2019-04-19 NOTE — TELEPHONE ENCOUNTER
----- Message from Ana Posadas MD sent at 4/19/2019  9:43 AM EDT -----  Call patient to see if she is scheduling the surgery advised by dr Benigno Bruno  If yes  Nicol Crabtree

## 2019-04-30 ENCOUNTER — HOSPITAL ENCOUNTER (OUTPATIENT)
Dept: MRI IMAGING | Facility: CLINIC | Age: 34
Discharge: HOME | End: 2019-05-02
Payer: COMMERCIAL

## 2019-04-30 DIAGNOSIS — R52 PAIN: ICD-10-CM

## 2019-04-30 PROCEDURE — 72141 MRI NECK SPINE W/O DYE: CPT

## 2019-06-03 ENCOUNTER — HOSPITAL ENCOUNTER (OUTPATIENT)
Age: 34
Setting detail: OUTPATIENT SURGERY
Discharge: HOME | End: 2019-06-03
Attending: ORTHOPAEDIC SURGERY | Admitting: ORTHOPAEDIC SURGERY
Payer: COMMERCIAL

## 2019-06-03 ENCOUNTER — APPOINTMENT (OUTPATIENT)
Dept: GENERAL RADIOLOGY | Age: 34
End: 2019-06-03
Attending: ORTHOPAEDIC SURGERY
Payer: COMMERCIAL

## 2019-06-03 VITALS
DIASTOLIC BLOOD PRESSURE: 103 MMHG | HEART RATE: 78 BPM | OXYGEN SATURATION: 98 % | TEMPERATURE: 98.1 F | BODY MASS INDEX: 23.47 KG/M2 | HEIGHT: 64 IN | WEIGHT: 137.5 LBS | RESPIRATION RATE: 16 BRPM | SYSTOLIC BLOOD PRESSURE: 147 MMHG

## 2019-06-03 LAB
-: NORMAL
ABO/RH: NORMAL
ABSOLUTE EOS #: 0.05 K/UL (ref 0–0.44)
ABSOLUTE IMMATURE GRANULOCYTE: <0.03 K/UL (ref 0–0.3)
ABSOLUTE LYMPH #: 2.27 K/UL (ref 1.1–3.7)
ABSOLUTE MONO #: 0.49 K/UL (ref 0.1–1.2)
AMORPHOUS: NORMAL
ANION GAP SERPL CALCULATED.3IONS-SCNC: 15 MMOL/L (ref 9–17)
ANTIBODY SCREEN: NEGATIVE
ARM BAND NUMBER: NORMAL
BACTERIA: NORMAL
BASOPHILS # BLD: 1 % (ref 0–2)
BASOPHILS ABSOLUTE: 0.04 K/UL (ref 0–0.2)
BILIRUBIN URINE: NEGATIVE
BUN BLDV-MCNC: 11 MG/DL (ref 6–20)
BUN/CREAT BLD: NORMAL (ref 9–20)
CALCIUM SERPL-MCNC: 9.9 MG/DL (ref 8.6–10.4)
CASTS UA: NORMAL /LPF (ref 0–8)
CHLORIDE BLD-SCNC: 104 MMOL/L (ref 98–107)
CO2: 21 MMOL/L (ref 20–31)
COLOR: YELLOW
COMMENT UA: ABNORMAL
CREAT SERPL-MCNC: 0.69 MG/DL (ref 0.5–0.9)
CRYSTALS, UA: NORMAL /HPF
CULTURE: NORMAL
DIFFERENTIAL TYPE: ABNORMAL
EOSINOPHILS RELATIVE PERCENT: 1 % (ref 1–4)
EPITHELIAL CELLS UA: NORMAL /HPF (ref 0–5)
EXPIRATION DATE: NORMAL
GFR AFRICAN AMERICAN: >60 ML/MIN
GFR NON-AFRICAN AMERICAN: >60 ML/MIN
GFR SERPL CREATININE-BSD FRML MDRD: NORMAL ML/MIN/{1.73_M2}
GFR SERPL CREATININE-BSD FRML MDRD: NORMAL ML/MIN/{1.73_M2}
GLUCOSE BLD-MCNC: 96 MG/DL (ref 70–99)
GLUCOSE URINE: NEGATIVE
HCT VFR BLD CALC: 41 % (ref 36.3–47.1)
HEMOGLOBIN: 14.8 G/DL (ref 11.9–15.1)
IMMATURE GRANULOCYTES: 0 %
KETONES, URINE: NEGATIVE
LEUKOCYTE ESTERASE, URINE: NEGATIVE
LYMPHOCYTES # BLD: 31 % (ref 24–43)
Lab: NORMAL
MCH RBC QN AUTO: 32.7 PG (ref 25.2–33.5)
MCHC RBC AUTO-ENTMCNC: 36.1 G/DL (ref 28.4–34.8)
MCV RBC AUTO: 90.5 FL (ref 82.6–102.9)
MONOCYTES # BLD: 7 % (ref 3–12)
MUCUS: NORMAL
NITRITE, URINE: NEGATIVE
NRBC AUTOMATED: 0 PER 100 WBC
OTHER OBSERVATIONS UA: NORMAL
PDW BLD-RTO: 11.6 % (ref 11.8–14.4)
PH UA: 5.5 (ref 5–8)
PLATELET # BLD: 239 K/UL (ref 138–453)
PLATELET ESTIMATE: ABNORMAL
PMV BLD AUTO: 10 FL (ref 8.1–13.5)
POTASSIUM SERPL-SCNC: 4 MMOL/L (ref 3.7–5.3)
PROTEIN UA: NEGATIVE
RBC # BLD: 4.53 M/UL (ref 3.95–5.11)
RBC # BLD: ABNORMAL 10*6/UL
RBC UA: NORMAL /HPF (ref 0–4)
RENAL EPITHELIAL, UA: NORMAL /HPF
SEG NEUTROPHILS: 61 % (ref 36–65)
SEGMENTED NEUTROPHILS ABSOLUTE COUNT: 4.4 K/UL (ref 1.5–8.1)
SODIUM BLD-SCNC: 140 MMOL/L (ref 135–144)
SPECIFIC GRAVITY UA: 1.01 (ref 1–1.03)
SPECIMEN DESCRIPTION: NORMAL
TRICHOMONAS: NORMAL
TURBIDITY: CLEAR
URINE HGB: ABNORMAL
UROBILINOGEN, URINE: NORMAL
WBC # BLD: 7.3 K/UL (ref 3.5–11.3)
WBC # BLD: ABNORMAL 10*3/UL
WBC UA: NORMAL /HPF (ref 0–5)
YEAST: NORMAL

## 2019-06-03 PROCEDURE — 86900 BLOOD TYPING SEROLOGIC ABO: CPT

## 2019-06-03 PROCEDURE — 87641 MR-STAPH DNA AMP PROBE: CPT

## 2019-06-03 PROCEDURE — 81001 URINALYSIS AUTO W/SCOPE: CPT

## 2019-06-03 PROCEDURE — 85025 COMPLETE CBC W/AUTO DIFF WBC: CPT

## 2019-06-03 PROCEDURE — 84703 CHORIONIC GONADOTROPIN ASSAY: CPT

## 2019-06-03 PROCEDURE — 80048 BASIC METABOLIC PNL TOTAL CA: CPT

## 2019-06-03 PROCEDURE — 86901 BLOOD TYPING SEROLOGIC RH(D): CPT

## 2019-06-03 PROCEDURE — 87086 URINE CULTURE/COLONY COUNT: CPT

## 2019-06-03 PROCEDURE — 86850 RBC ANTIBODY SCREEN: CPT

## 2019-06-03 PROCEDURE — 87081 CULTURE SCREEN ONLY: CPT

## 2019-06-03 ASSESSMENT — PAIN - FUNCTIONAL ASSESSMENT: PAIN_FUNCTIONAL_ASSESSMENT: 0-10

## 2019-06-03 ASSESSMENT — PAIN DESCRIPTION - DESCRIPTORS: DESCRIPTORS: ACHING;SHARP

## 2019-06-03 NOTE — PROGRESS NOTES
Patient presented to pre-op area with elevated blood pressure. Complaining of headache. Reports she did not take her migraine medication today. 1413- 153/109- rt arm    1416- 161/108- rt arm (MD at bedside and states to recheck in 15min and notify him of result)    1430- 147/103- lt arm    1435- MD Geronimo Prakash informed that BP is still elevated after multiple rechecks. Also informed of EKG results. MD reported to bedside to speak with patient. No further orders recvd at this time. She will continue to be monitored. 1500- MD at bedside. Patient and spouse informed that surgery will be rescheduled. She is to make an appointment with her PCP to evaluate elevated BP. Informed of BP readings which she will present to her PCP. IV discontinued. Patient discharged with spouse.

## 2019-06-04 LAB
CULTURE: NORMAL
HCG, PREGNANCY URINE (POC): NEGATIVE
Lab: NORMAL
MRSA, DNA, NASAL: NORMAL
SPECIMEN DESCRIPTION: NORMAL
SPECIMEN DESCRIPTION: NORMAL

## 2019-06-07 ENCOUNTER — OFFICE VISIT (OUTPATIENT)
Dept: INTERNAL MEDICINE CLINIC | Age: 34
End: 2019-06-07
Payer: COMMERCIAL

## 2019-06-07 VITALS
SYSTOLIC BLOOD PRESSURE: 138 MMHG | WEIGHT: 139 LBS | HEIGHT: 64 IN | OXYGEN SATURATION: 99 % | HEART RATE: 81 BPM | BODY MASS INDEX: 23.73 KG/M2 | DIASTOLIC BLOOD PRESSURE: 100 MMHG

## 2019-06-07 DIAGNOSIS — M62.838 MUSCLE SPASM: ICD-10-CM

## 2019-06-07 DIAGNOSIS — M48.02 CERVICAL SPINAL STENOSIS: ICD-10-CM

## 2019-06-07 DIAGNOSIS — I10 UNCONTROLLED HYPERTENSION: Primary | ICD-10-CM

## 2019-06-07 DIAGNOSIS — R93.89 ABNORMAL MRI: ICD-10-CM

## 2019-06-07 DIAGNOSIS — G43.719 INTRACTABLE CHRONIC MIGRAINE WITHOUT AURA AND WITHOUT STATUS MIGRAINOSUS: ICD-10-CM

## 2019-06-07 DIAGNOSIS — M54.12 CERVICAL RADICULITIS: ICD-10-CM

## 2019-06-07 DIAGNOSIS — G44.221 CHRONIC TENSION-TYPE HEADACHE, INTRACTABLE: ICD-10-CM

## 2019-06-07 DIAGNOSIS — G44.211 INTRACTABLE EPISODIC TENSION-TYPE HEADACHE: ICD-10-CM

## 2019-06-07 DIAGNOSIS — G43.109 MIGRAINE WITH AURA AND WITHOUT STATUS MIGRAINOSUS, NOT INTRACTABLE: ICD-10-CM

## 2019-06-07 DIAGNOSIS — J31.0 CHRONIC RHINITIS: ICD-10-CM

## 2019-06-07 DIAGNOSIS — M79.2 RADICULAR PAIN IN RIGHT ARM: ICD-10-CM

## 2019-06-07 PROCEDURE — 99214 OFFICE O/P EST MOD 30 MIN: CPT | Performed by: FAMILY MEDICINE

## 2019-06-07 RX ORDER — CLONIDINE HYDROCHLORIDE 0.1 MG/1
0.1 TABLET ORAL 2 TIMES DAILY
Qty: 60 TABLET | Refills: 3 | Status: SHIPPED | OUTPATIENT
Start: 2019-06-07 | End: 2019-10-15 | Stop reason: SDUPTHER

## 2019-06-07 ASSESSMENT — ENCOUNTER SYMPTOMS
ALLERGIC/IMMUNOLOGIC NEGATIVE: 1
GASTROINTESTINAL NEGATIVE: 1
RESPIRATORY NEGATIVE: 1
EYES NEGATIVE: 1

## 2019-06-07 ASSESSMENT — PATIENT HEALTH QUESTIONNAIRE - PHQ9
SUM OF ALL RESPONSES TO PHQ9 QUESTIONS 1 & 2: 0
SUM OF ALL RESPONSES TO PHQ QUESTIONS 1-9: 0
1. LITTLE INTEREST OR PLEASURE IN DOING THINGS: 0
2. FEELING DOWN, DEPRESSED OR HOPELESS: 0
SUM OF ALL RESPONSES TO PHQ QUESTIONS 1-9: 0

## 2019-06-07 NOTE — PROGRESS NOTES
Visit Information    Have you changed or started any medications since your last visit including any over-the-counter medicines, vitamins, or herbal medicines? no   Are you having any side effects from any of your medications? -  no  Have you stopped taking any of your medications? Is so, why? -  no    Have you seen any other physician or provider since your last visit? Yes - Records Obtained  Have you had any other diagnostic tests since your last visit? Yes - Records Obtained  Have you been seen in the emergency room and/or had an admission to a hospital since we last saw you? No  Have you had your routine dental cleaning in the past 6 months? no    Have you activated your [email or web address] account? If not, what are your barriers?  Yes     Patient Care Team:  Wandy Devi MD as PCP - General (Family Medicine)  Wandy Devi MD as PCP - [facility]  Ross May MD as Consulting Physician (Neurology)    Medical History Review  Past Medical, Family, and Social History reviewed and does not contribute to the patient presenting condition    Health Maintenance   Topic Date Due    Varicella Vaccine (1 of 2 - 13+ 2-dose series) 02/02/1999    HIV screen  02/02/2001    Flu vaccine (Season Ended) 09/01/2019    Cervical cancer screen  10/27/2020    DTaP/Tdap/Td vaccine (3 - Td) 12/20/2026    Pneumococcal 0-64 years Vaccine  Aged Out

## 2019-06-07 NOTE — PROGRESS NOTES
Subjective:      Patient ID: Raine García is a 36 y.o. female. Hypertension   This is a new problem. The current episode started more than 1 month ago. The problem is unchanged. The problem is uncontrolled. Associated symptoms include anxiety and neck pain. Risk factors for coronary artery disease include stress. Past treatments include nothing. The current treatment provides no improvement. Compliance problems include psychosocial issues. Review of Systems   Constitutional: Negative. HENT: Negative. Eyes: Negative. Respiratory: Negative. Cardiovascular: Negative. Gastrointestinal: Negative. Endocrine: Negative. Musculoskeletal: Positive for arthralgias, myalgias and neck pain. Skin: Negative. Allergic/Immunologic: Negative. Neurological: Negative. Hematological: Negative. Psychiatric/Behavioral: Positive for dysphoric mood. The patient is nervous/anxious. Past family and social history unremarkable. Objective:   Physical Exam   Constitutional: She is oriented to person, place, and time. She appears well-developed and well-nourished. HENT:   Head: Normocephalic and atraumatic. Right Ear: External ear normal.   Left Ear: External ear normal.   Mouth/Throat: Oropharynx is clear and moist.   Eyes: Pupils are equal, round, and reactive to light. Conjunctivae and EOM are normal.   Neck: Normal range of motion. Neck supple. Cardiovascular: Normal rate, regular rhythm, normal heart sounds and intact distal pulses. Pulmonary/Chest: Effort normal and breath sounds normal.   Abdominal: Soft. Bowel sounds are normal.   Genitourinary: Vagina normal and uterus normal.   Musculoskeletal: Normal range of motion. 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 She is established with spine surgery, pain management and neurology. Neurological: She is alert and oriented to person, place, and time.  She has normal reflexes. History of migraine headache. She is on Fioricet, Maxalt, muscle relaxant and gabapentin as provided by neurology. Skin: Skin is warm and dry. Psychiatric:   Very anxious. Underlying chronic neck pain syndrome   Nursing note and vitals reviewed. Assessment:       Diagnosis Orders   1. Uncontrolled hypertension     2. Migraine with aura and without status migrainosus, not intractable     3. Chronic rhinitis     4. Intractable chronic migraine without aura and without status migrainosus     5. Intractable episodic tension-type headache     6. Muscle spasm     7. Chronic tension-type headache, intractable     8. Radicular pain in right arm     9. Cervical spinal stenosis     10. Cervical radiculitis     11. Abnormal MRI             Plan:      71-year-old right-hand-dominant  female who is a  is presented for follow-up. 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 MRI shows multilevel discogenic disease. No apparent cord compromise has been seen. Patient is established with neurology, pain management and cervical spine surgery. Patient stated that her spine surgery as scheduled her for elective decompression surgery. No apparent sign of myelopathy. Uncontrolled hypertension. She is advised to keep daily blood pressure log for office review, low-fat high-fiber diet, improve sleep hygiene, stress relaxation. I placed on clonidine. Migraine headache. She is established with neurology. She is on Zanaflex, gabapentin, Fioricet, Maxalt. She will benefit from sleep hygiene, maintaining headache diary, refrain from judicious use of over-the-counter pain medication. Neck muscle spasm. Continue Zanaflex and gabapentin  Allergic rhinitis without any flare. Continue nasal saline over-the-counter antihistamine  Very anxious individual.  However she denies being depressed. Reassurance provided.   She would benefit from clonidine. She would also benefit from antianxiety medications. Consider professional counseling  She denies tobacco, excessive alcohol or illicit drug use  Med list reviewed advised to continue  Available labs reviewed, discussed with patient, question answered. She will be reevaluated in one month. Advised to bring blood pressure log  This note is created with a voice recognition program and while intend to generate a document that accurately reflects the content of the visit, no guarantee can be provided that every mistake has been identified and corrected by editing.                 Bruce Gallagher MD

## 2019-06-24 ENCOUNTER — HOSPITAL ENCOUNTER (INPATIENT)
Age: 34
LOS: 1 days | Discharge: HOME | DRG: 472 | End: 2019-06-25
Attending: ORTHOPAEDIC SURGERY | Admitting: ORTHOPAEDIC SURGERY
Payer: COMMERCIAL

## 2019-06-24 ENCOUNTER — ANESTHESIA EVENT (OUTPATIENT)
Dept: OPERATING ROOM | Age: 34
DRG: 472 | End: 2019-06-24
Payer: COMMERCIAL

## 2019-06-24 ENCOUNTER — APPOINTMENT (OUTPATIENT)
Dept: GENERAL RADIOLOGY | Age: 34
DRG: 472 | End: 2019-06-24
Attending: ORTHOPAEDIC SURGERY
Payer: COMMERCIAL

## 2019-06-24 ENCOUNTER — ANESTHESIA (OUTPATIENT)
Dept: OPERATING ROOM | Age: 34
DRG: 472 | End: 2019-06-24
Payer: COMMERCIAL

## 2019-06-24 VITALS — SYSTOLIC BLOOD PRESSURE: 116 MMHG | DIASTOLIC BLOOD PRESSURE: 71 MMHG | TEMPERATURE: 97.5 F | OXYGEN SATURATION: 100 %

## 2019-06-24 DIAGNOSIS — G89.18 POST-OP PAIN: Primary | ICD-10-CM

## 2019-06-24 PROBLEM — G95.9 CERVICAL MYELOPATHY WITH CERVICAL RADICULOPATHY (HCC): Status: ACTIVE | Noted: 2019-06-24

## 2019-06-24 PROBLEM — M54.12 CERVICAL MYELOPATHY WITH CERVICAL RADICULOPATHY (HCC): Status: ACTIVE | Noted: 2019-06-24

## 2019-06-24 LAB
EKG ATRIAL RATE: 70 BPM
EKG P AXIS: 61 DEGREES
EKG P-R INTERVAL: 154 MS
EKG Q-T INTERVAL: 400 MS
EKG QRS DURATION: 74 MS
EKG QTC CALCULATION (BAZETT): 432 MS
EKG R AXIS: 19 DEGREES
EKG T AXIS: 35 DEGREES
EKG VENTRICULAR RATE: 70 BPM
HCG, PREGNANCY URINE (POC): NEGATIVE

## 2019-06-24 PROCEDURE — 2500000003 HC RX 250 WO HCPCS: Performed by: ORTHOPAEDIC SURGERY

## 2019-06-24 PROCEDURE — 7100000000 HC PACU RECOVERY - FIRST 15 MIN: Performed by: ORTHOPAEDIC SURGERY

## 2019-06-24 PROCEDURE — 93010 ELECTROCARDIOGRAM REPORT: CPT | Performed by: INTERNAL MEDICINE

## 2019-06-24 PROCEDURE — 6370000000 HC RX 637 (ALT 250 FOR IP): Performed by: ANESTHESIOLOGY

## 2019-06-24 PROCEDURE — 2580000003 HC RX 258: Performed by: ORTHOPAEDIC SURGERY

## 2019-06-24 PROCEDURE — 2720000010 HC SURG SUPPLY STERILE: Performed by: ORTHOPAEDIC SURGERY

## 2019-06-24 PROCEDURE — 6360000002 HC RX W HCPCS: Performed by: STUDENT IN AN ORGANIZED HEALTH CARE EDUCATION/TRAINING PROGRAM

## 2019-06-24 PROCEDURE — 6360000002 HC RX W HCPCS: Performed by: ORTHOPAEDIC SURGERY

## 2019-06-24 PROCEDURE — 6360000002 HC RX W HCPCS: Performed by: NURSE ANESTHETIST, CERTIFIED REGISTERED

## 2019-06-24 PROCEDURE — 2709999900 HC NON-CHARGEABLE SUPPLY: Performed by: ORTHOPAEDIC SURGERY

## 2019-06-24 PROCEDURE — 6370000000 HC RX 637 (ALT 250 FOR IP): Performed by: ORTHOPAEDIC SURGERY

## 2019-06-24 PROCEDURE — 1200000000 HC SEMI PRIVATE

## 2019-06-24 PROCEDURE — 7100000001 HC PACU RECOVERY - ADDTL 15 MIN: Performed by: ORTHOPAEDIC SURGERY

## 2019-06-24 PROCEDURE — C1713 ANCHOR/SCREW BN/BN,TIS/BN: HCPCS | Performed by: ORTHOPAEDIC SURGERY

## 2019-06-24 PROCEDURE — 3700000001 HC ADD 15 MINUTES (ANESTHESIA): Performed by: ORTHOPAEDIC SURGERY

## 2019-06-24 PROCEDURE — 0RG10K1 FUSION OF CERVICAL VERTEBRAL JOINT WITH NONAUTOLOGOUS TISSUE SUBSTITUTE, POSTERIOR APPROACH, POSTERIOR COLUMN, OPEN APPROACH: ICD-10-PCS | Performed by: ORTHOPAEDIC SURGERY

## 2019-06-24 PROCEDURE — 6360000002 HC RX W HCPCS: Performed by: ANESTHESIOLOGY

## 2019-06-24 PROCEDURE — 2580000003 HC RX 258: Performed by: NURSE ANESTHETIST, CERTIFIED REGISTERED

## 2019-06-24 PROCEDURE — 3600000004 HC SURGERY LEVEL 4 BASE: Performed by: ORTHOPAEDIC SURGERY

## 2019-06-24 PROCEDURE — 2500000003 HC RX 250 WO HCPCS: Performed by: NURSE ANESTHETIST, CERTIFIED REGISTERED

## 2019-06-24 PROCEDURE — 81025 URINE PREGNANCY TEST: CPT

## 2019-06-24 PROCEDURE — 3600000014 HC SURGERY LEVEL 4 ADDTL 15MIN: Performed by: ORTHOPAEDIC SURGERY

## 2019-06-24 PROCEDURE — 3700000000 HC ANESTHESIA ATTENDED CARE: Performed by: ORTHOPAEDIC SURGERY

## 2019-06-24 PROCEDURE — 72040 X-RAY EXAM NECK SPINE 2-3 VW: CPT

## 2019-06-24 PROCEDURE — 2580000003 HC RX 258: Performed by: STUDENT IN AN ORGANIZED HEALTH CARE EDUCATION/TRAINING PROGRAM

## 2019-06-24 PROCEDURE — 93005 ELECTROCARDIOGRAM TRACING: CPT | Performed by: ORTHOPAEDIC SURGERY

## 2019-06-24 PROCEDURE — 2580000003 HC RX 258: Performed by: ANESTHESIOLOGY

## 2019-06-24 PROCEDURE — 6370000000 HC RX 637 (ALT 250 FOR IP): Performed by: STUDENT IN AN ORGANIZED HEALTH CARE EDUCATION/TRAINING PROGRAM

## 2019-06-24 DEVICE — DBM T45001 1CC PASTE GRAFTON PLUS
Type: IMPLANTABLE DEVICE | Status: FUNCTIONAL
Brand: GRAFTON®AND GRAFTON PLUS®DEMINERALIZED BONE MATRIX (DBM)

## 2019-06-24 DEVICE — 3.5MM ELLIPSE POLYAXIAL SCREW, 14MM
Type: IMPLANTABLE DEVICE | Site: SPINE CERVICAL | Status: FUNCTIONAL
Brand: ELLIPSE

## 2019-06-24 DEVICE — GRAFT BONE SUB 7.5CC CORTICOCANCELLOUS PARTICULATE FRZ DRY: Type: IMPLANTABLE DEVICE | Status: FUNCTIONAL

## 2019-06-24 DEVICE — ELLIPSE LOCKING CAP
Type: IMPLANTABLE DEVICE | Site: SPINE CERVICAL | Status: FUNCTIONAL
Brand: ELLIPSE

## 2019-06-24 DEVICE — 3.5MM ELLIPSE ROD, 40MM
Type: IMPLANTABLE DEVICE | Site: SPINE CERVICAL | Status: FUNCTIONAL
Brand: ELLIPSE

## 2019-06-24 RX ORDER — ONDANSETRON 2 MG/ML
4 INJECTION INTRAMUSCULAR; INTRAVENOUS
Status: DISCONTINUED | OUTPATIENT
Start: 2019-06-24 | End: 2019-06-24 | Stop reason: HOSPADM

## 2019-06-24 RX ORDER — DIPHENHYDRAMINE HYDROCHLORIDE 50 MG/ML
12.5 INJECTION INTRAMUSCULAR; INTRAVENOUS
Status: DISCONTINUED | OUTPATIENT
Start: 2019-06-24 | End: 2019-06-24 | Stop reason: HOSPADM

## 2019-06-24 RX ORDER — SODIUM CHLORIDE, SODIUM LACTATE, POTASSIUM CHLORIDE, CALCIUM CHLORIDE 600; 310; 30; 20 MG/100ML; MG/100ML; MG/100ML; MG/100ML
INJECTION, SOLUTION INTRAVENOUS CONTINUOUS PRN
Status: DISCONTINUED | OUTPATIENT
Start: 2019-06-24 | End: 2019-06-24 | Stop reason: SDUPTHER

## 2019-06-24 RX ORDER — OXYCODONE HYDROCHLORIDE AND ACETAMINOPHEN 5; 325 MG/1; MG/1
2 TABLET ORAL EVERY 4 HOURS PRN
Status: DISCONTINUED | OUTPATIENT
Start: 2019-06-24 | End: 2019-06-25 | Stop reason: HOSPADM

## 2019-06-24 RX ORDER — PROPOFOL 10 MG/ML
INJECTION, EMULSION INTRAVENOUS PRN
Status: DISCONTINUED | OUTPATIENT
Start: 2019-06-24 | End: 2019-06-24 | Stop reason: SDUPTHER

## 2019-06-24 RX ORDER — FENTANYL CITRATE 50 UG/ML
25 INJECTION, SOLUTION INTRAMUSCULAR; INTRAVENOUS EVERY 5 MIN PRN
Status: COMPLETED | OUTPATIENT
Start: 2019-06-24 | End: 2019-06-24

## 2019-06-24 RX ORDER — LIDOCAINE HYDROCHLORIDE 10 MG/ML
INJECTION, SOLUTION EPIDURAL; INFILTRATION; INTRACAUDAL; PERINEURAL PRN
Status: DISCONTINUED | OUTPATIENT
Start: 2019-06-24 | End: 2019-06-24 | Stop reason: SDUPTHER

## 2019-06-24 RX ORDER — GABAPENTIN 400 MG/1
400 CAPSULE ORAL 2 TIMES DAILY
Status: DISCONTINUED | OUTPATIENT
Start: 2019-06-24 | End: 2019-06-25 | Stop reason: HOSPADM

## 2019-06-24 RX ORDER — MORPHINE SULFATE 2 MG/ML
2 INJECTION, SOLUTION INTRAMUSCULAR; INTRAVENOUS
Status: DISCONTINUED | OUTPATIENT
Start: 2019-06-24 | End: 2019-06-25 | Stop reason: HOSPADM

## 2019-06-24 RX ORDER — ONDANSETRON 2 MG/ML
INJECTION INTRAMUSCULAR; INTRAVENOUS PRN
Status: DISCONTINUED | OUTPATIENT
Start: 2019-06-24 | End: 2019-06-24 | Stop reason: SDUPTHER

## 2019-06-24 RX ORDER — ACETAMINOPHEN 325 MG/1
650 TABLET ORAL EVERY 4 HOURS PRN
Status: DISCONTINUED | OUTPATIENT
Start: 2019-06-24 | End: 2019-06-25 | Stop reason: HOSPADM

## 2019-06-24 RX ORDER — GLYCOPYRROLATE 1 MG/5 ML
SYRINGE (ML) INTRAVENOUS PRN
Status: DISCONTINUED | OUTPATIENT
Start: 2019-06-24 | End: 2019-06-24 | Stop reason: SDUPTHER

## 2019-06-24 RX ORDER — OXYCODONE HYDROCHLORIDE AND ACETAMINOPHEN 5; 325 MG/1; MG/1
2 TABLET ORAL PRN
Status: COMPLETED | OUTPATIENT
Start: 2019-06-24 | End: 2019-06-24

## 2019-06-24 RX ORDER — MORPHINE SULFATE 2 MG/ML
2 INJECTION, SOLUTION INTRAMUSCULAR; INTRAVENOUS EVERY 5 MIN PRN
Status: COMPLETED | OUTPATIENT
Start: 2019-06-24 | End: 2019-06-24

## 2019-06-24 RX ORDER — MIDAZOLAM HYDROCHLORIDE 1 MG/ML
0.5 INJECTION INTRAMUSCULAR; INTRAVENOUS 4 TIMES DAILY PRN
Status: DISCONTINUED | OUTPATIENT
Start: 2019-06-24 | End: 2019-06-24

## 2019-06-24 RX ORDER — OXYCODONE HYDROCHLORIDE AND ACETAMINOPHEN 5; 325 MG/1; MG/1
1 TABLET ORAL PRN
Status: COMPLETED | OUTPATIENT
Start: 2019-06-24 | End: 2019-06-24

## 2019-06-24 RX ORDER — OXYCODONE HYDROCHLORIDE AND ACETAMINOPHEN 5; 325 MG/1; MG/1
1 TABLET ORAL EVERY 4 HOURS PRN
Status: DISCONTINUED | OUTPATIENT
Start: 2019-06-24 | End: 2019-06-25 | Stop reason: HOSPADM

## 2019-06-24 RX ORDER — PROPOFOL 10 MG/ML
INJECTION, EMULSION INTRAVENOUS CONTINUOUS PRN
Status: DISCONTINUED | OUTPATIENT
Start: 2019-06-24 | End: 2019-06-24 | Stop reason: SDUPTHER

## 2019-06-24 RX ORDER — SODIUM CHLORIDE 0.9 % (FLUSH) 0.9 %
10 SYRINGE (ML) INJECTION PRN
Status: DISCONTINUED | OUTPATIENT
Start: 2019-06-24 | End: 2019-06-25 | Stop reason: HOSPADM

## 2019-06-24 RX ORDER — TIZANIDINE 4 MG/1
4 TABLET ORAL NIGHTLY
Status: DISCONTINUED | OUTPATIENT
Start: 2019-06-24 | End: 2019-06-25 | Stop reason: HOSPADM

## 2019-06-24 RX ORDER — RIZATRIPTAN BENZOATE 10 MG/1
10 TABLET ORAL
Status: ACTIVE | OUTPATIENT
Start: 2019-06-24 | End: 2019-06-24

## 2019-06-24 RX ORDER — FENTANYL CITRATE 50 UG/ML
50 INJECTION, SOLUTION INTRAMUSCULAR; INTRAVENOUS EVERY 5 MIN PRN
Status: COMPLETED | OUTPATIENT
Start: 2019-06-24 | End: 2019-06-24

## 2019-06-24 RX ORDER — KETOROLAC TROMETHAMINE 30 MG/ML
INJECTION, SOLUTION INTRAMUSCULAR; INTRAVENOUS PRN
Status: DISCONTINUED | OUTPATIENT
Start: 2019-06-24 | End: 2019-06-24 | Stop reason: SDUPTHER

## 2019-06-24 RX ORDER — MORPHINE SULFATE 4 MG/ML
4 INJECTION, SOLUTION INTRAMUSCULAR; INTRAVENOUS
Status: DISCONTINUED | OUTPATIENT
Start: 2019-06-24 | End: 2019-06-25 | Stop reason: HOSPADM

## 2019-06-24 RX ORDER — LABETALOL HYDROCHLORIDE 5 MG/ML
5 INJECTION, SOLUTION INTRAVENOUS EVERY 10 MIN PRN
Status: DISCONTINUED | OUTPATIENT
Start: 2019-06-24 | End: 2019-06-24 | Stop reason: HOSPADM

## 2019-06-24 RX ORDER — PROMETHAZINE HYDROCHLORIDE 25 MG/ML
12.5 INJECTION, SOLUTION INTRAMUSCULAR; INTRAVENOUS EVERY 6 HOURS PRN
Status: DISCONTINUED | OUTPATIENT
Start: 2019-06-24 | End: 2019-06-25 | Stop reason: HOSPADM

## 2019-06-24 RX ORDER — ROCURONIUM BROMIDE 10 MG/ML
INJECTION, SOLUTION INTRAVENOUS PRN
Status: DISCONTINUED | OUTPATIENT
Start: 2019-06-24 | End: 2019-06-24 | Stop reason: SDUPTHER

## 2019-06-24 RX ORDER — MAGNESIUM HYDROXIDE 1200 MG/15ML
LIQUID ORAL CONTINUOUS PRN
Status: COMPLETED | OUTPATIENT
Start: 2019-06-24 | End: 2019-06-24

## 2019-06-24 RX ORDER — SODIUM CHLORIDE 0.9 % (FLUSH) 0.9 %
10 SYRINGE (ML) INJECTION EVERY 12 HOURS SCHEDULED
Status: DISCONTINUED | OUTPATIENT
Start: 2019-06-24 | End: 2019-06-25 | Stop reason: HOSPADM

## 2019-06-24 RX ORDER — ONDANSETRON 2 MG/ML
4 INJECTION INTRAMUSCULAR; INTRAVENOUS EVERY 6 HOURS PRN
Status: DISCONTINUED | OUTPATIENT
Start: 2019-06-24 | End: 2019-06-25 | Stop reason: HOSPADM

## 2019-06-24 RX ORDER — DEXAMETHASONE SODIUM PHOSPHATE 4 MG/ML
4 INJECTION, SOLUTION INTRA-ARTICULAR; INTRALESIONAL; INTRAMUSCULAR; INTRAVENOUS; SOFT TISSUE EVERY 6 HOURS
Status: DISCONTINUED | OUTPATIENT
Start: 2019-06-24 | End: 2019-06-25 | Stop reason: HOSPADM

## 2019-06-24 RX ORDER — CLONIDINE HYDROCHLORIDE 0.1 MG/1
0.1 TABLET ORAL 2 TIMES DAILY
Status: DISCONTINUED | OUTPATIENT
Start: 2019-06-24 | End: 2019-06-25 | Stop reason: HOSPADM

## 2019-06-24 RX ORDER — FENTANYL CITRATE 50 UG/ML
INJECTION, SOLUTION INTRAMUSCULAR; INTRAVENOUS PRN
Status: DISCONTINUED | OUTPATIENT
Start: 2019-06-24 | End: 2019-06-24 | Stop reason: SDUPTHER

## 2019-06-24 RX ORDER — NEOSTIGMINE METHYLSULFATE 5 MG/5 ML
SYRINGE (ML) INTRAVENOUS PRN
Status: DISCONTINUED | OUTPATIENT
Start: 2019-06-24 | End: 2019-06-24 | Stop reason: SDUPTHER

## 2019-06-24 RX ORDER — DEXAMETHASONE SODIUM PHOSPHATE 10 MG/ML
INJECTION INTRAMUSCULAR; INTRAVENOUS PRN
Status: DISCONTINUED | OUTPATIENT
Start: 2019-06-24 | End: 2019-06-24 | Stop reason: SDUPTHER

## 2019-06-24 RX ORDER — SODIUM CHLORIDE, SODIUM LACTATE, POTASSIUM CHLORIDE, CALCIUM CHLORIDE 600; 310; 30; 20 MG/100ML; MG/100ML; MG/100ML; MG/100ML
INJECTION, SOLUTION INTRAVENOUS CONTINUOUS
Status: DISCONTINUED | OUTPATIENT
Start: 2019-06-24 | End: 2019-06-24

## 2019-06-24 RX ADMIN — MORPHINE SULFATE 4 MG: 4 INJECTION INTRAVENOUS at 23:47

## 2019-06-24 RX ADMIN — FENTANYL CITRATE 50 MCG: 50 INJECTION INTRAMUSCULAR; INTRAVENOUS at 13:42

## 2019-06-24 RX ADMIN — FENTANYL CITRATE 50 MCG: 50 INJECTION INTRAMUSCULAR; INTRAVENOUS at 09:57

## 2019-06-24 RX ADMIN — Medication 0.4 MG: at 11:26

## 2019-06-24 RX ADMIN — ROCURONIUM BROMIDE 10 MG: 10 INJECTION INTRAVENOUS at 10:24

## 2019-06-24 RX ADMIN — PHENYLEPHRINE HYDROCHLORIDE 25 MCG/MIN: 10 INJECTION INTRAVENOUS at 09:35

## 2019-06-24 RX ADMIN — MORPHINE SULFATE 4 MG: 4 INJECTION INTRAVENOUS at 19:09

## 2019-06-24 RX ADMIN — OXYCODONE HYDROCHLORIDE AND ACETAMINOPHEN 2 TABLET: 5; 325 TABLET ORAL at 17:00

## 2019-06-24 RX ADMIN — MORPHINE SULFATE 2 MG: 2 INJECTION, SOLUTION INTRAMUSCULAR; INTRAVENOUS at 12:19

## 2019-06-24 RX ADMIN — SODIUM CHLORIDE, POTASSIUM CHLORIDE, SODIUM LACTATE AND CALCIUM CHLORIDE: 600; 310; 30; 20 INJECTION, SOLUTION INTRAVENOUS at 07:55

## 2019-06-24 RX ADMIN — FENTANYL CITRATE 50 MCG: 50 INJECTION INTRAMUSCULAR; INTRAVENOUS at 13:37

## 2019-06-24 RX ADMIN — OXYCODONE HYDROCHLORIDE AND ACETAMINOPHEN 2 TABLET: 5; 325 TABLET ORAL at 13:00

## 2019-06-24 RX ADMIN — Medication 0.2 MG: at 09:20

## 2019-06-24 RX ADMIN — FENTANYL CITRATE 25 MCG: 50 INJECTION INTRAMUSCULAR; INTRAVENOUS at 12:05

## 2019-06-24 RX ADMIN — ROCURONIUM BROMIDE 10 MG: 10 INJECTION INTRAVENOUS at 11:13

## 2019-06-24 RX ADMIN — DEXAMETHASONE SODIUM PHOSPHATE 4 MG: 4 INJECTION, SOLUTION INTRAMUSCULAR; INTRAVENOUS at 15:46

## 2019-06-24 RX ADMIN — CLONIDINE HYDROCHLORIDE 0.1 MG: 0.1 TABLET ORAL at 21:03

## 2019-06-24 RX ADMIN — MORPHINE SULFATE 2 MG: 2 INJECTION, SOLUTION INTRAMUSCULAR; INTRAVENOUS at 12:40

## 2019-06-24 RX ADMIN — MORPHINE SULFATE 2 MG: 2 INJECTION, SOLUTION INTRAMUSCULAR; INTRAVENOUS at 12:35

## 2019-06-24 RX ADMIN — ROCURONIUM BROMIDE 10 MG: 10 INJECTION INTRAVENOUS at 09:57

## 2019-06-24 RX ADMIN — FENTANYL CITRATE 50 MCG: 50 INJECTION INTRAMUSCULAR; INTRAVENOUS at 09:20

## 2019-06-24 RX ADMIN — KETOROLAC TROMETHAMINE 30 MG: 30 INJECTION, SOLUTION INTRAMUSCULAR at 11:18

## 2019-06-24 RX ADMIN — Medication 2 G: at 17:47

## 2019-06-24 RX ADMIN — Medication 10 ML: at 21:03

## 2019-06-24 RX ADMIN — PROMETHAZINE HYDROCHLORIDE 12.5 MG: 25 INJECTION INTRAMUSCULAR; INTRAVENOUS at 15:36

## 2019-06-24 RX ADMIN — DEXAMETHASONE SODIUM PHOSPHATE 10 MG: 10 INJECTION INTRAMUSCULAR; INTRAVENOUS at 09:44

## 2019-06-24 RX ADMIN — SODIUM CHLORIDE, POTASSIUM CHLORIDE, SODIUM LACTATE AND CALCIUM CHLORIDE: 600; 310; 30; 20 INJECTION, SOLUTION INTRAVENOUS at 09:26

## 2019-06-24 RX ADMIN — MIDAZOLAM HYDROCHLORIDE 0.5 MG: 1 INJECTION, SOLUTION INTRAMUSCULAR; INTRAVENOUS at 09:09

## 2019-06-24 RX ADMIN — ONDANSETRON 4 MG: 2 INJECTION INTRAMUSCULAR; INTRAVENOUS at 14:24

## 2019-06-24 RX ADMIN — LIDOCAINE HYDROCHLORIDE 50 MG: 10 INJECTION, SOLUTION EPIDURAL; INFILTRATION; INTRACAUDAL; PERINEURAL at 09:20

## 2019-06-24 RX ADMIN — FENTANYL CITRATE 25 MCG: 50 INJECTION INTRAMUSCULAR; INTRAVENOUS at 12:10

## 2019-06-24 RX ADMIN — ROCURONIUM BROMIDE 40 MG: 10 INJECTION INTRAVENOUS at 09:20

## 2019-06-24 RX ADMIN — MORPHINE SULFATE 2 MG: 2 INJECTION, SOLUTION INTRAMUSCULAR; INTRAVENOUS at 12:30

## 2019-06-24 RX ADMIN — MORPHINE SULFATE 2 MG: 2 INJECTION, SOLUTION INTRAMUSCULAR; INTRAVENOUS at 15:46

## 2019-06-24 RX ADMIN — OXYCODONE HYDROCHLORIDE AND ACETAMINOPHEN 2 TABLET: 5; 325 TABLET ORAL at 21:03

## 2019-06-24 RX ADMIN — TIZANIDINE 4 MG: 4 TABLET ORAL at 21:03

## 2019-06-24 RX ADMIN — DEXAMETHASONE SODIUM PHOSPHATE 4 MG: 4 INJECTION, SOLUTION INTRAMUSCULAR; INTRAVENOUS at 21:03

## 2019-06-24 RX ADMIN — PROPOFOL 75 MCG/KG/MIN: 10 INJECTION, EMULSION INTRAVENOUS at 09:35

## 2019-06-24 RX ADMIN — Medication 2 G: at 09:45

## 2019-06-24 RX ADMIN — MIDAZOLAM HYDROCHLORIDE 0.5 MG: 1 INJECTION, SOLUTION INTRAMUSCULAR; INTRAVENOUS at 09:00

## 2019-06-24 RX ADMIN — Medication 0.2 MG: at 10:22

## 2019-06-24 RX ADMIN — SUFENTANIL CITRATE 0.4 MCG/KG/HR: 50 INJECTION EPIDURAL; INTRAVENOUS at 09:35

## 2019-06-24 RX ADMIN — GABAPENTIN 400 MG: 400 CAPSULE ORAL at 21:03

## 2019-06-24 RX ADMIN — FENTANYL CITRATE 25 MCG: 50 INJECTION INTRAMUSCULAR; INTRAVENOUS at 12:17

## 2019-06-24 RX ADMIN — MIDAZOLAM HYDROCHLORIDE 0.5 MG: 1 INJECTION, SOLUTION INTRAMUSCULAR; INTRAVENOUS at 09:05

## 2019-06-24 RX ADMIN — Medication 3.5 MG: at 11:26

## 2019-06-24 RX ADMIN — ONDANSETRON 4 MG: 2 INJECTION, SOLUTION INTRAMUSCULAR; INTRAVENOUS at 11:18

## 2019-06-24 RX ADMIN — PROPOFOL 200 MG: 10 INJECTION, EMULSION INTRAVENOUS at 09:20

## 2019-06-24 RX ADMIN — FENTANYL CITRATE 25 MCG: 50 INJECTION INTRAMUSCULAR; INTRAVENOUS at 11:59

## 2019-06-24 ASSESSMENT — PULMONARY FUNCTION TESTS
PIF_VALUE: 16
PIF_VALUE: 17
PIF_VALUE: 2
PIF_VALUE: 16
PIF_VALUE: 16
PIF_VALUE: 17
PIF_VALUE: 16
PIF_VALUE: 17
PIF_VALUE: 2
PIF_VALUE: 16
PIF_VALUE: 20
PIF_VALUE: 17
PIF_VALUE: 1
PIF_VALUE: 16
PIF_VALUE: 15
PIF_VALUE: 16
PIF_VALUE: 17
PIF_VALUE: 17
PIF_VALUE: 15
PIF_VALUE: 16
PIF_VALUE: 17
PIF_VALUE: 17
PIF_VALUE: 1
PIF_VALUE: 17
PIF_VALUE: 17
PIF_VALUE: 16
PIF_VALUE: 16
PIF_VALUE: 15
PIF_VALUE: 16
PIF_VALUE: 17
PIF_VALUE: 16
PIF_VALUE: 17
PIF_VALUE: 16
PIF_VALUE: 1
PIF_VALUE: 17
PIF_VALUE: 17
PIF_VALUE: 16
PIF_VALUE: 17
PIF_VALUE: 20
PIF_VALUE: 0
PIF_VALUE: 17
PIF_VALUE: 17
PIF_VALUE: 16
PIF_VALUE: 17
PIF_VALUE: 16
PIF_VALUE: 15
PIF_VALUE: 16
PIF_VALUE: 16
PIF_VALUE: 17
PIF_VALUE: 18
PIF_VALUE: 16
PIF_VALUE: 17
PIF_VALUE: 1
PIF_VALUE: 17
PIF_VALUE: 17
PIF_VALUE: 5
PIF_VALUE: 17
PIF_VALUE: 17
PIF_VALUE: 16
PIF_VALUE: 16
PIF_VALUE: 17
PIF_VALUE: 1
PIF_VALUE: 0
PIF_VALUE: 16
PIF_VALUE: 17
PIF_VALUE: 17
PIF_VALUE: 16
PIF_VALUE: 16
PIF_VALUE: 1
PIF_VALUE: 17
PIF_VALUE: 16
PIF_VALUE: 17
PIF_VALUE: 16
PIF_VALUE: 15
PIF_VALUE: 17
PIF_VALUE: 17
PIF_VALUE: 16
PIF_VALUE: 17
PIF_VALUE: 16
PIF_VALUE: 0
PIF_VALUE: 17
PIF_VALUE: 17
PIF_VALUE: 18
PIF_VALUE: 17
PIF_VALUE: 17
PIF_VALUE: 16
PIF_VALUE: 17
PIF_VALUE: 1
PIF_VALUE: 16
PIF_VALUE: 17
PIF_VALUE: 16
PIF_VALUE: 16
PIF_VALUE: 18
PIF_VALUE: 16
PIF_VALUE: 17
PIF_VALUE: 15
PIF_VALUE: 17
PIF_VALUE: 15
PIF_VALUE: 5
PIF_VALUE: 17
PIF_VALUE: 16
PIF_VALUE: 15
PIF_VALUE: 17
PIF_VALUE: 15
PIF_VALUE: 0
PIF_VALUE: 17
PIF_VALUE: 17
PIF_VALUE: 1
PIF_VALUE: 1
PIF_VALUE: 17
PIF_VALUE: 17
PIF_VALUE: 16
PIF_VALUE: 1
PIF_VALUE: 17
PIF_VALUE: 16
PIF_VALUE: 17
PIF_VALUE: 14
PIF_VALUE: 2
PIF_VALUE: 1
PIF_VALUE: 16
PIF_VALUE: 17
PIF_VALUE: 16
PIF_VALUE: 1
PIF_VALUE: 17
PIF_VALUE: 17
PIF_VALUE: 16
PIF_VALUE: 16
PIF_VALUE: 17
PIF_VALUE: 17

## 2019-06-24 ASSESSMENT — PAIN DESCRIPTION - DESCRIPTORS
DESCRIPTORS: ACHING
DESCRIPTORS: ACHING

## 2019-06-24 ASSESSMENT — PAIN DESCRIPTION - LOCATION
LOCATION: NECK

## 2019-06-24 ASSESSMENT — PAIN SCALES - GENERAL
PAINLEVEL_OUTOF10: 8
PAINLEVEL_OUTOF10: 9
PAINLEVEL_OUTOF10: 7
PAINLEVEL_OUTOF10: 8
PAINLEVEL_OUTOF10: 5
PAINLEVEL_OUTOF10: 5
PAINLEVEL_OUTOF10: 8
PAINLEVEL_OUTOF10: 9
PAINLEVEL_OUTOF10: 8
PAINLEVEL_OUTOF10: 9
PAINLEVEL_OUTOF10: 9
PAINLEVEL_OUTOF10: 8
PAINLEVEL_OUTOF10: 6
PAINLEVEL_OUTOF10: 8
PAINLEVEL_OUTOF10: 6
PAINLEVEL_OUTOF10: 7
PAINLEVEL_OUTOF10: 6
PAINLEVEL_OUTOF10: 7
PAINLEVEL_OUTOF10: 8
PAINLEVEL_OUTOF10: 6
PAINLEVEL_OUTOF10: 6

## 2019-06-24 ASSESSMENT — PAIN DESCRIPTION - PROGRESSION
CLINICAL_PROGRESSION: NOT CHANGED

## 2019-06-24 ASSESSMENT — PAIN DESCRIPTION - PAIN TYPE
TYPE: ACUTE PAIN;SURGICAL PAIN
TYPE: SURGICAL PAIN

## 2019-06-24 ASSESSMENT — PAIN DESCRIPTION - FREQUENCY
FREQUENCY: CONTINUOUS
FREQUENCY: CONTINUOUS

## 2019-06-24 ASSESSMENT — PAIN DESCRIPTION - ONSET
ONSET: ON-GOING
ONSET: ON-GOING

## 2019-06-24 ASSESSMENT — PAIN DESCRIPTION - ORIENTATION
ORIENTATION: POSTERIOR
ORIENTATION: POSTERIOR

## 2019-06-24 ASSESSMENT — LIFESTYLE VARIABLES: SMOKING_STATUS: 0

## 2019-06-24 ASSESSMENT — PAIN - FUNCTIONAL ASSESSMENT: PAIN_FUNCTIONAL_ASSESSMENT: 0-10

## 2019-06-24 NOTE — BRIEF OP NOTE
Brief Postoperative Note  ______________________________________________________________    Patient: Aida Morales  YOB: 1985  MRN: [id number]  Date of Procedure: 6/24/2019    Pre-Op Diagnosis: CERVICAL INSTABILITY C3/C4    Post-Op Diagnosis: Same       Procedure(s):  Posterior cervical fusion C3-C4  Use of corticocancellous bone allograft    Anesthesia: General    Surgeon(s):  Brien Ahmadi MD    Assistant: Yara Alva DO    Estimated Blood Loss (mL): 20 mL    Fluids: 800 mL crystalloid    Complications: None    Implants:  Implant Name Type Inv.  Item Serial No.  Lot No. LRB No. Used   SERGIO-PASTE TESSA TISSUE [facility] - [id number] Bone/Graft/Tissue/Human/Synth SERGIO-PASTE TESSA TISSUE [facility] [id number] [facility] N/A N/A 1   Garth Bridger FREEZ-DRIED 7.5CC - [id number] Spine SERGIO-BONE CORTCL CANC FREEZ-DRIED 7.5CC [id number] [facility] TISSUE [facility]  N/A 1   CAP LK ELLIPSE Spine CAP LK ELLIPSE  [employer]  N/A 4   IMPL SPINE GUIDO ELLIPSE 3.5X40MM Spine IMPL SPINE GUIDO ELLIPSE 3.5X40MM  [employer]  N/A 2   SCREW POLYAXIAL ELLIPSE 3.5X14MM Spine SCREW POLYAXIAL ELLIPSE 3.5X14MM  [employer]  N/A 4           Findings: C3-C4 instability    Dusty Todd DO  Date: 6/24/2019  Time: 11:43 AM

## 2019-06-24 NOTE — FLOWSHEET NOTE
 Spiritual Assessment: The patient was awake with her  when I entered the room. The patient told me that she was feeling better. The patient's children were able to [facility] with her recently. The patient said that she is not part of any Jehovah's witness.  Intervention: I talked to the patient about what brought her into the hospital. The patient talked briefly about her children. I offered to pray with the patient.  Outcome: I got the impression that they were not Voodoo, however they accepted my offer for prayer. I told them that if a  can be of service to them, please let us know, we have people here 24/7.       06/24/19 1913   Encounter Summary   Services provided to: Patient and family together   Referral/Consult From: [facility] [address]; Children;Family members   Place of Bahai   (None)   Continue Visiting   (06/24/19)   Complexity of Encounter Low   Length of Encounter 15 minutes   Spiritual Assessment Completed Yes   Routine   Type Initial   Assessment Calm; Approachable;Passive; Anxious   Intervention Active listening;Explored feelings, thoughts, concerns;Prayer;Sustaining presence/ Ministry of presence   Outcome Expressed gratitude;Expressed feelings/needs/concerns;Comfort

## 2019-06-24 NOTE — H&P
Surgical H&P    Reason for surgery:  The patient is a 36 y.o. female with cervical myelopathy from C3-C4 spondylolisthesis. Here today for C3-C4 fusion. No changes in medical history. No recent illnesses. Past Medical History:    Past Medical History:   Diagnosis Date    Headache     MIGRAINES    Hypertension 2019    ON RX    Psoriasis     BEHIND EARS       Past Surgical History:    Past Surgical History:   Procedure Laterality Date     SECTION  , 2014    x2    CYST REMOVAL Left     breast    TUBAL LIGATION         Medications Prior to Admission:   Prior to Admission medications    Medication Sig Start Date End Date Taking? Authorizing Provider   cloNIDine (CATAPRES) 0.1 MG tablet Take 1 tablet by mouth 2 times daily 19  Yes Herbert Plaza MD   tiZANidine (ZANAFLEX) 4 MG tablet Take one tab nightly as needed for spasms 19  Yes Oksana Blue MD   gabapentin (NEURONTIN) 400 MG capsule TAKE ONE CAP TWICE DAILY AT 9 AM AND 5 PM 19 Yes Oksana Blue MD   Jose Ree (AIMOVIG 140 DOSE) 70 MG/ML SOAJ Inject into the skin every 30 days   Yes Historical Provider, MD   rizatriptan (MAXALT) 10 MG tablet Take 1 tablet by mouth once as needed for Migraine May repeat in 2 hours if needed 19 Yes Oksana Blue MD   butalbital-acetaminophen-caffeine (FIORICET, ESGIC) -40 MG per tablet Take one tab every day as needed for severe headache 19   Oksana Blue MD       Allergies:    Patient has no known allergies.     Social History:   Social History     Socioeconomic History    Marital status:      Spouse name: None    Number of children: None    Years of education: None    Highest education level: None   Occupational History    None   Social Needs    Financial resource strain: None    Food insecurity:     Worry: None     Inability: None    Transportation needs:     Medical: None     Non-medical: None Tobacco Use    Smoking status: Never Smoker    Smokeless tobacco: Never Used   Substance and Sexual Activity    Alcohol use: Yes     Alcohol/week: 0.0 oz     Comment: MIXED DRINKS 2 TO 3 A MONTH    Drug use: No    Sexual activity: Yes     Partners: Male   Lifestyle    Physical activity:     Days per week: None     Minutes per session: None    Stress: None   Relationships    Social connections:     Talks on phone: None     Gets together: None     Attends Tenriism service: None     Active member of club or organization: None     Attends meetings of clubs or organizations: None     Relationship status: None    Intimate partner violence:     Fear of current or ex partner: None     Emotionally abused: None     Physically abused: None     Forced sexual activity: None   Other Topics Concern    None   Social History Narrative    None       Family History:  Family History   Problem Relation Age of Onset    High Blood Pressure Father     Migraines Sister     Migraines Paternal Grandmother     Heart Disease Paternal Grandmother     Cancer Paternal Grandfather         brain       REVIEW OF SYSTEMS:  General: no fevers or chills  Cardiovascular: Regular rate, no dependent edema, distal pulses 2+  Respiratory: Chest symmetric, no accessory muscle use, normal respirations  EXT/MSK: Cervical pain and upper/lower extremity weakness. ROS negative other than stated above    PHYSICAL EXAM:  /81   Pulse 85   Temp 98.1 °F (36.7 °C) (Temporal)   Resp 16   Ht 5' 4\" (1.626 m)   Wt 140 lb (63.5 kg)   LMP 06/03/2019 (Exact Date)   SpO2 100%   BMI 24.03 kg/m²   Gen: alert and oriented, NAD, cooperative  Head: normocephalic atraumatic   Cardiovascular: Regular rate, no dependent edema, distal pulses 2+  Respiratory: Chest symmetric, no accessory muscle use, normal respirations  MSK: Cervical midline TTP. 2+ rad pulse. Median/Radial/Ulnar/AIN/PIN motor intact. Median/Radial/Ulnar nerve SILT.  Strength 5/5 C5-C8 bilaterally. 2+ DP pulse. Strength 5/5 L3-S1. TA/EHL/FHL/GS motor intact. Deep and Superficial Peroneal/Saphenous/Sural/Plantar SILT.         A/P: 36 y.o. female being seen for cervical myelopathy from C3-C4 instability.     - OR today for C3-C4 posterior laminectomy and fusion  - NPO since MN  - ABx OCTOR  -  held  - Consent signed, patient marked    Huong Baldwin DO,  PGY-2 Orthopedic Surgery  8:57 AM 6/24/2019

## 2019-06-24 NOTE — ANESTHESIA PRE PROCEDURE
Department of Anesthesiology  Preprocedure Note       Name:  Mica Andrews   Age:  36 y.o.  :  1985                                          MRN:  [id number]         Date:  2019      Surgeon: Miguel Ángel Isbell):  Bala Santizo MD    Procedure: POSTERIOR CERVICAL FUSION C3-4  (GLOBUS, SSEP EVOKES CONF# [id number] - ANEL.) (N/A )   ,    Department of Anesthesiology  Pre-Anesthesia Evaluation/Consultation         Name:  Mica Andrews                                         Age:  36 y.o. MRN:  [id number]             Medications  Current Facility-Administered Medications   Medication Dose Route Frequency Provider Last Rate Last Dose    ceFAZolin (ANCEF) 2 g in dextrose 5 % 50 mL IVPB  2 g Intravenous On Call to [address], MD           No Known Allergies  Patient Active Problem List   Diagnosis    Migraine with aura and without status migrainosus, not intractable    Chronic rhinitis    Intractable chronic migraine without aura and without status migrainosus    Intractable episodic tension-type headache    Muscle spasm    Chronic tension-type headache, intractable    Radicular pain in right arm    Cervical spinal stenosis    Cervical radiculitis    Abnormal MRI    Uncontrolled hypertension     Past Medical History:   Diagnosis Date    Headache 2003    MIGRAINES    Hypertension 2019    ON RX    Psoriasis     BEHIND EARS     Past Surgical History:   Procedure Laterality Date     SECTION  , 2014    x2    CYST REMOVAL Left 2004    breast    TUBAL LIGATION       Social History     Tobacco Use    Smoking status: Never Smoker    Smokeless tobacco: Never Used   Substance Use Topics    Alcohol use: Yes     Alcohol/week: 0.0 oz     Comment: MIXED DRINKS 2 TO 3 A MONTH    Drug use: No         Vital Signs (Current) There were no vitals filed for this visit.   Vital Signs Statistics (for past 48 hrs)     No data recorded  BP Readings from Last 3 Encounters:   19 (!) 138/100   06/03/19 (!) 147/103   04/12/19 (!) 143/100       BMI  Body mass index is 24.03 kg/m². CBC   Lab Results   Component Value Date    WBC 7.3 06/03/2019    RBC 4.53 06/03/2019    HGB 14.8 06/03/2019    HCT 41.0 06/03/2019    MCV 90.5 06/03/2019    RDW 11.6 06/03/2019     06/03/2019       CMP    Lab Results   Component Value Date     06/03/2019    K 4.0 06/03/2019     06/03/2019    CO2 21 06/03/2019    BUN 11 06/03/2019    CREATININE 0.69 06/03/2019    GFRAA >60 06/03/2019    LABGLOM >60 06/03/2019    GLUCOSE 96 06/03/2019    CALCIUM 9.9 06/03/2019       BMP    Lab Results   Component Value Date     06/03/2019    K 4.0 06/03/2019     06/03/2019    CO2 21 06/03/2019    BUN 11 06/03/2019    CREATININE 0.69 06/03/2019    CALCIUM 9.9 06/03/2019    GFRAA >60 06/03/2019    LABGLOM >60 06/03/2019    GLUCOSE 96 06/03/2019       POC Testing  No results for input(s): POCGLU, POCNA, POCK, POCCL, POCBUN, POCHEMO, POCHCT in the last 72 hours. Coags  No results found for: PROTIME, INR, APTT    HCG (If Applicable)   Lab Results   Component Value Date    PREGTESTUR negative 10/27/2017    HCG NEGATIVE 06/03/2019        ABGs No results found for: PHART, PO2ART, OME0UND, TVZ6NGW, BEART, R3JNFQEG     Type & Screen (If Applicable)  No results found for: LABABO, [address]    Radiology (If Applicable)    Cardiac Testing (If Applicable)     EKG (If Applicable) sinus arrthymia          Medications prior to admission:   Prior to Admission medications    Medication Sig Start Date End Date Taking?  Authorizing Provider   cloNIDine (CATAPRES) 0.1 MG tablet Take 1 tablet by mouth 2 times daily 6/7/19  Yes Radha Man MD   tiZANidine (ZANAFLEX) 4 MG tablet Take one tab nightly as needed for spasms 4/8/19  Yes Manju Knapp MD   gabapentin (NEURONTIN) 400 MG capsule TAKE ONE CAP TWICE DAILY AT 9 AM AND 5 PM 4/8/19 4/7/20 Yes Manju Knapp MD   Erenumab-aooe (AIMOVIG 140 DOSE) 70 MG/ML SOAJ Inject into the skin every 30 days   Yes Historical Provider, MD   rizatriptan (MAXALT) 10 MG tablet Take 1 tablet by mouth once as needed for Migraine May repeat in 2 hours if needed 19 Yes Servando Brock MD   butalbital-acetaminophen-caffeine (FIORICET, ESGIC) -40 MG per tablet Take one tab every day as needed for severe headache 19   Servando Brock MD       Current medications:    Current Facility-Administered Medications   Medication Dose Route Frequency Provider Last Rate Last Dose    ceFAZolin (ANCEF) 2 g in dextrose 5 % 50 mL IVPB  2 g Intravenous On Call to [address], MD           Allergies:  No Known Allergies    Problem List:    Patient Active Problem List   Diagnosis Code    Migraine with aura and without status migrainosus, not intractable G43. 109    Chronic rhinitis J31.0    Intractable chronic migraine without aura and without status migrainosus G43.719    Intractable episodic tension-type headache G44. 211    Muscle spasm M62.838    Chronic tension-type headache, intractable G44.221    Radicular pain in right arm M79.2    Cervical spinal stenosis M48.02    Cervical radiculitis M54.12    Abnormal MRI R93.89    Uncontrolled hypertension I10       Past Medical History:        Diagnosis Date    Headache 2003    MIGRAINES    Hypertension 2019    ON RX    Psoriasis     BEHIND EARS       Past Surgical History:        Procedure Laterality Date     SECTION  , 2014    x2    CYST REMOVAL Left 2004    breast    TUBAL LIGATION         Social History:    Social History     Tobacco Use    Smoking status: Never Smoker    Smokeless tobacco: Never Used   Substance Use Topics    Alcohol use:  Yes     Alcohol/week: 0.0 oz     Comment: MIXED DRINKS 2 TO 3 A MONTH                                Counseling given: Not Answered      Vital Signs (Current):   Vitals:    19 1337 19 0733   Weight: 140 lb (63.5 kg) 140 lb (63.5 kg)   Height: 5' 4\" (1.626 m) 5' 4\" (1.626 m)                                              BP Readings from Last 3 Encounters:   06/07/19 (!) 138/100   06/03/19 (!) 147/103   04/12/19 (!) 143/100       NPO Status: Time of last liquid consumption: 2230                        Time of last solid consumption: 1900                        Date of last liquid consumption: 06/23/19                        Date of last solid food consumption: 06/23/19    BMI:   Wt Readings from Last 3 Encounters:   06/24/19 140 lb (63.5 kg)   06/07/19 139 lb (63 kg)   06/03/19 137 lb 8 oz (62.4 kg)     Body mass index is 24.03 kg/m². CBC:   Lab Results   Component Value Date    WBC 7.3 06/03/2019    RBC 4.53 06/03/2019    HGB 14.8 06/03/2019    HCT 41.0 06/03/2019    MCV 90.5 06/03/2019    RDW 11.6 06/03/2019     06/03/2019       CMP:   Lab Results   Component Value Date     06/03/2019    K 4.0 06/03/2019     06/03/2019    CO2 21 06/03/2019    BUN 11 06/03/2019    CREATININE 0.69 06/03/2019    GFRAA >60 06/03/2019    LABGLOM >60 06/03/2019    GLUCOSE 96 06/03/2019    CALCIUM 9.9 06/03/2019       POC Tests: No results for input(s): POCGLU, POCNA, POCK, POCCL, POCBUN, POCHEMO, POCHCT in the last 72 hours.     Coags: No results found for: PROTIME, INR, APTT    HCG (If Applicable):   Lab Results   Component Value Date    PREGTESTUR negative 10/27/2017    HCG NEGATIVE 06/03/2019        ABGs: No results found for: PHART, PO2ART, XGS9WWS, WMW3PXL, BEART, K8FXCRPL     Type & Screen (If Applicable):  No results found for: LABABO, LABRH    Anesthesia Evaluation   no history of anesthetic complications:   Airway: Mallampati: II     Neck ROM: limited   Dental:          Pulmonary:       (-) recent URI and not a current smoker                           Cardiovascular:Negative CV ROS    (+) hypertension:,                   Neuro/Psych:   (+) headaches: migraine headaches, tension headaches,    (-) seizures GI/Hepatic/Renal:        (-) GERD       Endo/Other:        (-) diabetes mellitus               Abdominal:           Vascular:                                        Anesthesia Plan      general     ASA 2     (Asa 2)  Induction: intravenous.                           Ignacio Holley MD   6/24/2019

## 2019-06-25 VITALS
DIASTOLIC BLOOD PRESSURE: 53 MMHG | RESPIRATION RATE: 18 BRPM | SYSTOLIC BLOOD PRESSURE: 121 MMHG | BODY MASS INDEX: 23.9 KG/M2 | OXYGEN SATURATION: 97 % | WEIGHT: 140 LBS | TEMPERATURE: 98.1 F | HEART RATE: 63 BPM | HEIGHT: 64 IN

## 2019-06-25 LAB
ABSOLUTE EOS #: 0 K/UL (ref 0–0.4)
ABSOLUTE IMMATURE GRANULOCYTE: 0.15 K/UL (ref 0–0.3)
ABSOLUTE LYMPH #: 0.46 K/UL (ref 1–4.8)
ABSOLUTE MONO #: 0.46 K/UL (ref 0.1–0.8)
ALBUMIN SERPL-MCNC: 3.9 G/DL (ref 3.5–5.2)
ALBUMIN/GLOBULIN RATIO: 1.3 (ref 1–2.5)
ALP BLD-CCNC: 41 U/L (ref 35–104)
ALT SERPL-CCNC: 9 U/L (ref 5–33)
ANION GAP SERPL CALCULATED.3IONS-SCNC: 11 MMOL/L (ref 9–17)
AST SERPL-CCNC: 17 U/L
BASOPHILS # BLD: 0 % (ref 0–2)
BASOPHILS ABSOLUTE: 0 K/UL (ref 0–0.2)
BILIRUB SERPL-MCNC: 0.81 MG/DL (ref 0.3–1.2)
BUN BLDV-MCNC: 11 MG/DL (ref 6–20)
BUN/CREAT BLD: ABNORMAL (ref 9–20)
CALCIUM SERPL-MCNC: 9.3 MG/DL (ref 8.6–10.4)
CHLORIDE BLD-SCNC: 103 MMOL/L (ref 98–107)
CO2: 21 MMOL/L (ref 20–31)
CREAT SERPL-MCNC: 0.72 MG/DL (ref 0.5–0.9)
DIFFERENTIAL TYPE: ABNORMAL
EOSINOPHILS RELATIVE PERCENT: 0 % (ref 1–4)
GFR AFRICAN AMERICAN: >60 ML/MIN
GFR NON-AFRICAN AMERICAN: >60 ML/MIN
GFR SERPL CREATININE-BSD FRML MDRD: ABNORMAL ML/MIN/{1.73_M2}
GFR SERPL CREATININE-BSD FRML MDRD: ABNORMAL ML/MIN/{1.73_M2}
GLUCOSE BLD-MCNC: 152 MG/DL (ref 70–99)
HCT VFR BLD CALC: 39.5 % (ref 36.3–47.1)
HEMOGLOBIN: 13.7 G/DL (ref 11.9–15.1)
IMMATURE GRANULOCYTES: 1 %
LYMPHOCYTES # BLD: 3 % (ref 24–44)
MCH RBC QN AUTO: 32.9 PG (ref 25.2–33.5)
MCHC RBC AUTO-ENTMCNC: 34.7 G/DL (ref 28.4–34.8)
MCV RBC AUTO: 94.7 FL (ref 82.6–102.9)
MONOCYTES # BLD: 3 % (ref 1–7)
MORPHOLOGY: ABNORMAL
NRBC AUTOMATED: 0 PER 100 WBC
PDW BLD-RTO: 11.6 % (ref 11.8–14.4)
PLATELET # BLD: 229 K/UL (ref 138–453)
PLATELET ESTIMATE: ABNORMAL
PMV BLD AUTO: 10.7 FL (ref 8.1–13.5)
POTASSIUM SERPL-SCNC: 5.3 MMOL/L (ref 3.7–5.3)
RBC # BLD: 4.17 M/UL (ref 3.95–5.11)
RBC # BLD: ABNORMAL 10*6/UL
SEG NEUTROPHILS: 93 % (ref 36–66)
SEGMENTED NEUTROPHILS ABSOLUTE COUNT: 14.33 K/UL (ref 1.8–7.7)
SODIUM BLD-SCNC: 135 MMOL/L (ref 135–144)
TOTAL PROTEIN: 6.8 G/DL (ref 6.4–8.3)
WBC # BLD: 15.4 K/UL (ref 3.5–11.3)
WBC # BLD: ABNORMAL 10*3/UL

## 2019-06-25 PROCEDURE — 97116 GAIT TRAINING THERAPY: CPT

## 2019-06-25 PROCEDURE — 85025 COMPLETE CBC W/AUTO DIFF WBC: CPT

## 2019-06-25 PROCEDURE — 80053 COMPREHEN METABOLIC PANEL: CPT

## 2019-06-25 PROCEDURE — 2580000003 HC RX 258: Performed by: STUDENT IN AN ORGANIZED HEALTH CARE EDUCATION/TRAINING PROGRAM

## 2019-06-25 PROCEDURE — 6370000000 HC RX 637 (ALT 250 FOR IP): Performed by: STUDENT IN AN ORGANIZED HEALTH CARE EDUCATION/TRAINING PROGRAM

## 2019-06-25 PROCEDURE — 97162 PT EVAL MOD COMPLEX 30 MIN: CPT

## 2019-06-25 PROCEDURE — 6360000002 HC RX W HCPCS: Performed by: STUDENT IN AN ORGANIZED HEALTH CARE EDUCATION/TRAINING PROGRAM

## 2019-06-25 PROCEDURE — 36415 COLL VENOUS BLD VENIPUNCTURE: CPT

## 2019-06-25 RX ORDER — DOCUSATE SODIUM 100 MG/1
100 CAPSULE, LIQUID FILLED ORAL 2 TIMES DAILY PRN
Qty: 60 CAPSULE | Refills: 0 | Status: SHIPPED | OUTPATIENT
Start: 2019-06-25 | End: 2020-11-10

## 2019-06-25 RX ORDER — OXYCODONE HYDROCHLORIDE AND ACETAMINOPHEN 5; 325 MG/1; MG/1
1 TABLET ORAL EVERY 4 HOURS PRN
Qty: 42 TABLET | Refills: 0 | Status: SHIPPED | OUTPATIENT
Start: 2019-06-25 | End: 2019-07-02

## 2019-06-25 RX ADMIN — DEXAMETHASONE SODIUM PHOSPHATE 4 MG: 4 INJECTION, SOLUTION INTRAMUSCULAR; INTRAVENOUS at 08:51

## 2019-06-25 RX ADMIN — OXYCODONE HYDROCHLORIDE AND ACETAMINOPHEN 2 TABLET: 5; 325 TABLET ORAL at 09:47

## 2019-06-25 RX ADMIN — OXYCODONE HYDROCHLORIDE AND ACETAMINOPHEN 2 TABLET: 5; 325 TABLET ORAL at 05:27

## 2019-06-25 RX ADMIN — GABAPENTIN 400 MG: 400 CAPSULE ORAL at 08:51

## 2019-06-25 RX ADMIN — MORPHINE SULFATE 4 MG: 4 INJECTION INTRAVENOUS at 08:51

## 2019-06-25 RX ADMIN — Medication 10 ML: at 08:51

## 2019-06-25 RX ADMIN — OXYCODONE HYDROCHLORIDE AND ACETAMINOPHEN 2 TABLET: 5; 325 TABLET ORAL at 01:11

## 2019-06-25 RX ADMIN — MORPHINE SULFATE 4 MG: 4 INJECTION INTRAVENOUS at 03:48

## 2019-06-25 RX ADMIN — Medication 2 G: at 01:11

## 2019-06-25 RX ADMIN — CLONIDINE HYDROCHLORIDE 0.1 MG: 0.1 TABLET ORAL at 08:51

## 2019-06-25 RX ADMIN — DEXAMETHASONE SODIUM PHOSPHATE 4 MG: 4 INJECTION, SOLUTION INTRAMUSCULAR; INTRAVENOUS at 03:47

## 2019-06-25 ASSESSMENT — PAIN SCALES - GENERAL
PAINLEVEL_OUTOF10: 8
PAINLEVEL_OUTOF10: 7
PAINLEVEL_OUTOF10: 7
PAINLEVEL_OUTOF10: 5
PAINLEVEL_OUTOF10: 6
PAINLEVEL_OUTOF10: 8
PAINLEVEL_OUTOF10: 4
PAINLEVEL_OUTOF10: 7
PAINLEVEL_OUTOF10: 4

## 2019-06-25 ASSESSMENT — PAIN DESCRIPTION - LOCATION: LOCATION: NECK

## 2019-06-25 ASSESSMENT — PAIN - FUNCTIONAL ASSESSMENT: PAIN_FUNCTIONAL_ASSESSMENT: PREVENTS OR INTERFERES SOME ACTIVE ACTIVITIES AND ADLS

## 2019-06-25 ASSESSMENT — PAIN DESCRIPTION - PAIN TYPE: TYPE: SURGICAL PAIN

## 2019-06-25 NOTE — OP NOTE
[facility]                  [address] [address]                                OPERATIVE REPORT    PATIENT NAME: Hilaria Simmons                  :        1985  MED REC NO:   [id number]                             ROOM:  ACCOUNT NO:   [de-identified]                           ADMIT DATE: 2019  PROVIDER:     Robina Weston MD    DATE OF PROCEDURE:  2019    SURGEON:  Robina Weston MD    ASSISTANT:  Dominga Hernandez DO    PREOPERATIVE DIAGNOSES:  1. Cervical myelopathy secondary to instability, C3-C4. 2.  Cervical instability, C3-C4. POSTOPERATIVE DIAGNOSES:  1. Cervical myelopathy secondary to instability, C3-C4. 2.  Cervical instability, C3-C4. PROCEDURES:  1. Posterior cervical fusion, C3-C4. 2.  Ellipse instrumentation, C3-C4. 3.  Allograft with bone and DBM    INDICATION:  The patient is a 29-year-old lady, history of injury to her  neck with increasing pain, tenderness, and marked instability of the  cervical spine on flexion imaging that showed gross motion at C3-C4. She had severe cervical myelopathy by clinical symptoms which had failed  a non-operative treatment program, and she presents for surgical  treatment. The surgical procedure, risks, benefits, and complications  were discussed with good comprehension; and informed consent obtained. NARRATIVE PROCEDURE:  The patient was brought into the operating room,  placed under appropriate general anesthesia, transferred to the  operating table in the prone position [facility]. Bony  prominences, axillae, and eyes were all protected. Perioperative  antibiotics were given prior to incision time. VTE prophylaxis was done  intraoperatively through SCD cuffs. The neck was prepped and draped in  the usual fashion. Evokes neuromonitoring was used with running EMG and  particularly SSEPs. All signals were good.   At this time, we draped off  the upper neck.  The neck had been prepped and draped in the usual  fashion. Once all was set, we did a time-out. We then made an incision  across the top of the neck. We could feel the C2 process, went down  from there distal.  Skin was incised sharply and bleeders coagulated. Dissection was carried down with electrocautery, down to the interval.   A hypotrophic spinous process of C3 was identified, and there was gross  motion across the C3 interval.  So, once that was secured and  identified, we then mobilized across the spine on each side carrying out  to the lateral edge of the facet joint, and once we had that opened, we  were then able to expose easily the facet joint. We took down the  capsule at C3-C4. We preserved the capsule at C2-C3 and C4-C5, and once  we had that exposed, we then took down the capsule, scraped off the  facet joint with a curette. Once that was mobilized easily, the joint  was well cleaned. We then went to placing our screws. Finding the  midpoint of each lateral mass, we then drilled a small entry hole using  a 3-rafat bur. We then used a pencil probe, probing along each facet,  catching the inferior cortical bone. We drilled through that with  bicortical purchase of each screw. We put 14-mm screws at each segment. Once that was locked into place, we then did a final x-ray, which was  good. We then opened each joint, put some [place] putty in each  segment. We then used allograft to pack around each of the facets at  C3-C4 and the lateral masses. We connected our alex to the screw heads,  locked it into place, and then final picture was felt to be good. Once  we had compressed into lordosis, we had good alignment. We then closed  the muscle in layers using #1 Vicryl suture. The fascia was closed  using #1 Vicryl suture, 0 and 2-0 in the subcutaneous tissues, and a  subcuticular stitch with Vicryl. Dressing was applied. EBL 25.  The patient was  then transferred to the bed;

## 2019-06-25 NOTE — PLAN OF CARE
Problem: Cerebrospinal Fluid Leakage - Risk Of:  Goal: Absence of cerebrospinal fluid drainage at surgical site  Description  Absence of cerebrospinal fluid drainage at surgical site  6/25/2019 0553 by Miguel Govea RN  Outcome: Met This Shift     Problem: Infection - Surgical Site:  Goal: Will show no infection signs and symptoms  Description  Will show no infection signs and symptoms  6/25/2019 0553 by Miguel Govea RN  Outcome: Met This Shift     Problem: Falls - Risk of:  Goal: Will remain free from falls  Description  Will remain free from falls  6/25/2019 0553 by Miguel Govea RN  Outcome: Met This Shift     Problem: Falls - Risk of:  Goal: Absence of physical injury  Description  Absence of physical injury  6/25/2019 0553 by Miguel Govea RN  Outcome: Met This Shift     Problem: Mobility - Impaired:  Goal: Mobility will improve to maximum level  Description  Mobility will improve to maximum level  6/25/2019 0553 by Miguel Govea RN  Outcome: Ongoing     Problem: Pain - Acute:  Goal: Pain level will decrease  Description  Pain level will decrease  6/25/2019 0553 by Miguel Govea RN  Outcome: Ongoing     Problem: Pain:  Goal: Pain level will decrease  Description  Pain level will decrease  6/25/2019 0553 by Miguel Govea RN  Outcome: Ongoing     Problem: Pain:  Goal: Control of acute pain  Description  Control of acute pain  6/25/2019 0553 by Miguel Govea RN  Outcome: Ongoing     Problem: Pain:  Goal: Control of chronic pain  Description  Control of chronic pain  6/25/2019 0553 by Miguel Govea RN  Outcome: Ongoing

## 2019-06-25 NOTE — ANESTHESIA POSTPROCEDURE EVALUATION
Department of Anesthesiology  Postprocedure Note    Patient: Jewels Valerio  MRN: [id number]  YOB: 1985  Date of evaluation: 6/25/2019  Time:  8:05 AM     Procedure Summary     Date:  06/24/19 Room / Location:  [place] [address] / [facility] OR    Anesthesia Start:  0915 Anesthesia Stop:  2195    Procedure:  POSTERIOR CERVICAL FUSION C3-4  (GLOBUS, SSEP EVOKES CONF# [id number] - ANEL.) (N/A ) Diagnosis:  (CERVICAL INSTABILITY, SPONDYLOSIS, MYELOPATHY)    Surgeon:  Radha Moyer MD Responsible Provider:  Mushtaq Maldonado MD    Anesthesia Type:  general ASA Status:  2          Anesthesia Type: general    Sarah Phase I: Sarah Score: 10    Sarah Phase II:      Last vitals: Reviewed and per EMR flowsheets.        Anesthesia Post Evaluation    Patient participation: complete - patient participated  Pain score: 8  Nausea & Vomiting: no nausea

## 2019-06-25 NOTE — PROGRESS NOTES
CLINICAL PHARMACY NOTE: MEDS TO [address] Select Patient?: No  Total # of Prescriptions Filled: 1   The following medications were delivered to the patient:  · PERCOCET  Total # of Interventions Completed: 0  Time Spent (min): 0    Additional Documentation:    GOT COLACE OTC

## 2019-06-25 NOTE — DISCHARGE SUMMARY
Orthopedic  Discharge Summary         Patient Identification:  Jo Watters is a 36 y.o. female. :  1985  MRN: [id number]     Acct: [de-identified]   Admit Date:  2019  Discharge date and time: 2019 12:24 PM     Attending Provider: Neo Bautista                               Reason for Admission: Posterior cervical fusion C3-4    Discharge Diagnoses:   Patient Active Problem List   Diagnosis    Migraine with aura and without status migrainosus, not intractable    Chronic rhinitis    Intractable chronic migraine without aura and without status migrainosus    Intractable episodic tension-type headache    Muscle spasm    Chronic tension-type headache, intractable    Radicular pain in right arm    Cervical spinal stenosis    Cervical radiculitis    Abnormal MRI    Uncontrolled hypertension    Cervical myelopathy with cervical radiculopathy        Consults:  None    Procedures: C3-4 post fusion    Hospital Course:   Jo Watters is a 36 y.o. female presents after c3-4 fusion . We discussed DC with patient and she is ok with DC. All questions and concerns were addressed at this time. Laboratory parameters were followed and optimized when possible.     Time spend on discharge discussion and plannin minutes    Disposition:   Home     Discharged Condition:  Stable     Discharge Medications:       Jaren Dia   Home Medication Instructions TTL:[id number]    Printed on:19 2325   Medication Information                      butalbital-acetaminophen-caffeine (FIORICET, ESGIC) -40 MG per tablet  Take one tab every day as needed for severe headache             cloNIDine (CATAPRES) 0.1 MG tablet  Take 1 tablet by mouth 2 times daily             docusate sodium (COLACE) 100 MG capsule  Take 1 capsule by mouth 2 times daily as needed for Constipation             Erenumab-aooe (AIMOVIG 140 DOSE) 70 MG/ML SOAJ  Inject into the skin every 30 days             gabapentin (NEURONTIN) 400 MG capsule  TAKE ONE CAP TWICE DAILY AT 9 AM AND 5 PM             oxyCODONE-acetaminophen (PERCOCET) 5-325 MG per tablet  Take 1 tablet by mouth every 4 hours as needed for Pain for up to 7 days. Intended supply: 7 days. Take lowest dose possible to manage pain             rizatriptan (MAXALT) 10 MG tablet  Take 1 tablet by mouth once as needed for Migraine May repeat in 2 hours if needed             tiZANidine (ZANAFLEX) 4 MG tablet  Take one tab nightly as needed for spasms                 Discharge Instructions: Follow up with Aida Hanks MD in 3-4 weeks. Follow up Dr. Elida Trujillo 10-14d.     Hospital acquired Infections: None     ----------------------------------------  Micki Gutierrez DO

## 2019-06-25 NOTE — PROGRESS NOTES
Physical Therapy    Facility/Department: [address] ORTHO/MED SURG  Initial Assessment    NAME: Fernando Wang  : 1985  MRN: [id number]    Date of Service: 2019  The patient is a 36 y.o. female with cervical myelopathy from C3-C4 spondylolisthesis. Here today for C3-C4 fusion. No changes in medical history. No recent illnesses. Discharge Recommendations: No further therapy required at discharge. PT Equipment Recommendations  Equipment Needed: No    Assessment   Assessment: pt has adequate strength and AROM of BUE/BLE. painful signs in neck with bilateral shoulder abd beyond 110. pt independent with transfers and ambulation while maintaining safety. Pt to be discharged from PT at this time. Prognosis: Good  Decision Making: Medium Complexity  Patient Education: home safety education and importance of C-collar  REQUIRES PT FOLLOW UP: No  Activity Tolerance  Activity Tolerance: Patient Tolerated treatment well       Patient Diagnosis(es): The encounter diagnosis was Post-op pain. has a past medical history of Headache, Hypertension, and Psoriasis. has a past surgical history that includes  section (, ); cyst removal (Left, ); Tubal ligation (); Neck surgery (2019); and cervical laminectomy (N/A, 2019).     Restrictions  Restrictions/Precautions  Restrictions/Precautions: Weight Bearing, General Precautions  Required Braces or Orthoses?: Yes  Required Braces or Orthoses  Cervical: (hard c collar)  Position Activity Restriction  Other position/activity restrictions: up with assist  Vision/Hearing  Vision: Within Functional Limits  Hearing: Within functional limits     Subjective  General  Chart Reviewed: Yes  Patient assessed for rehabilitation services?: Yes  Response To Previous Treatment: Not applicable  Family / Caregiver Present: Yes( present for PT eval)  Diagnosis: cervical myelopathy wth cervical radiculopathy  Follows Commands: Within Functional brennan  Additional Comments: pt has a good support system at home. pt is motivated to return to previous functionality. Cognition   Cognition  Overall Cognitive Status: [facility]    Objective  Observation/Palpation  Posture: Good    AROM RLE (degrees)  RLE AROM: WFL  AROM LLE (degrees)  LLE AROM : WFL  AROM RUE (degrees)  RUE AROM : WFL  RUE General AROM: R shoulder abd to 110 degrees then painful in her neck  AROM LUE (degrees)  LUE AROM : WFL  LUE General AROM: L shoulder abd to 110 degrees then painful  Strength RLE  Strength RLE: WFL  Strength LLE  Strength LLE: WFL  Strength RUE  Strength RUE: WFL  Strength LUE  Strength LUE: WFL  Tone RLE  RLE Tone: Normotonic  Tone LLE  LLE Tone: Normotonic  Motor Control  Gross Motor?: WNL  Sensation  Overall Sensation Status: [facility]  Bed mobility  Supine to Sit: Independent  Sit to Supine: Independent  Scooting: Independent  Transfers  Sit to Stand: Independent  Stand to sit: Independent  Ambulation  Ambulation?: Yes  Ambulation 1  Surface: level tile  Device: No Device  Other Apparatus: (hard C-collar)  Assistance: Contact guard assistance  Gait Deviations: Slow Flor  Distance: 300ft  Comments: pt amb 300ft with CGA and no AD. pt maintained wearing hard C-collar during amb. pt states this is similar to her normal gait but slightly slower. Stairs/Curb  Stairs?: No     Balance  Posture: Good  Sitting - Static: Good  Sitting - Dynamic: Good  Standing - Static: Good  Standing - Dynamic: Good      Plan   Plan  Plan Comment: pt able to demonstrate functional independence with transfers and ambulation. pt does not require PT at this time and to be discharged from PT. Safety Devices  Type of devices:  All fall risk precautions in place, Call light within reach, Gait belt, Left in bed, Nurse notified    G-Code     OutComes Score     AM-PAC Score  AM-PAC Inpatient Mobility Raw Score : 21 (06/25/19 1039)  AM-PAC Inpatient T-Scale Score : 50.25 (06/25/19 1039)  Mobility Inpatient CMS 0-100% Score: 28.97 (06/25/19 1039)  Mobility Inpatient CMS G-Code Modifier : CJ (06/25/19 1039)     Goals  Short term goals  Time Frame for Short term goals: pt able to demonstrate functional independence with transfers and ambulation. pt does not require PT at this time and to be discharged from PT. Patient Goals   Patient goals : return home with  and two children.      Therapy Time   Individual Concurrent Group Co-treatment   Time In [facility]         Time Out 0948         Minutes [address] [place] [address]

## 2019-06-26 ENCOUNTER — TELEPHONE (OUTPATIENT)
Dept: INTERNAL MEDICINE CLINIC | Age: 34
End: 2019-06-26

## 2019-07-08 ENCOUNTER — OFFICE VISIT (OUTPATIENT)
Dept: NEUROLOGY | Age: 34
End: 2019-07-08
Payer: COMMERCIAL

## 2019-07-08 VITALS
WEIGHT: 139.6 LBS | DIASTOLIC BLOOD PRESSURE: 90 MMHG | HEIGHT: 64 IN | SYSTOLIC BLOOD PRESSURE: 124 MMHG | HEART RATE: 87 BPM | BODY MASS INDEX: 23.83 KG/M2

## 2019-07-08 DIAGNOSIS — M62.838 MUSCLE SPASM: ICD-10-CM

## 2019-07-08 DIAGNOSIS — G43.109 MIGRAINE WITH AURA AND WITHOUT STATUS MIGRAINOSUS, NOT INTRACTABLE: Primary | ICD-10-CM

## 2019-07-08 DIAGNOSIS — G44.221 CHRONIC TENSION-TYPE HEADACHE, INTRACTABLE: ICD-10-CM

## 2019-07-08 PROCEDURE — 99214 OFFICE O/P EST MOD 30 MIN: CPT | Performed by: PSYCHIATRY & NEUROLOGY

## 2019-07-08 RX ORDER — GABAPENTIN 400 MG/1
CAPSULE ORAL
Qty: 60 CAPSULE | Refills: 3 | Status: SHIPPED | OUTPATIENT
Start: 2019-07-08 | End: 2019-11-13 | Stop reason: SDUPTHER

## 2019-07-08 RX ORDER — TIZANIDINE 4 MG/1
TABLET ORAL
Qty: 30 TABLET | Refills: 3 | Status: SHIPPED | OUTPATIENT
Start: 2019-07-08 | End: 2019-11-13 | Stop reason: SDUPTHER

## 2019-07-08 RX ORDER — BUTALBITAL, ACETAMINOPHEN AND CAFFEINE 50; 325; 40 MG/1; MG/1; MG/1
TABLET ORAL
Qty: 40 TABLET | Refills: 0 | Status: SHIPPED | OUTPATIENT
Start: 2019-07-08 | End: 2019-09-28 | Stop reason: SDUPTHER

## 2019-07-08 NOTE — PROGRESS NOTES
NEUROLOGY FOLLOW-UP  Patient Name:  Pete Edmond  :   1985  Clinic Visit Date: 2019        REVIEW OF SYSTEMS  Constitutional Weight changes: absent, Fevers : absent, Fatigue: present; Any recent hospitalizations:  absent.    HEENT Ears: normal, Eyes: noo correction, Visual disturbance: absent   Reespiratory Shortness of breath: absent, Cough: absent   Cardivascular Chest pain: absent, Leg swelling :absent   GI Constipation: present, Diarrhea: absent, Swallowing change: absent    Urinary frequency: absent, Urinary urgency: absent, Urinary incontinence: absent   Musculoskeletal Neck pain: absent, Back pain: absent, Stiffness: absent, Muscle pain: absent, Joint pain: absent   Dermatological Hair loss: absent, Skin changes: absent   Neurological Memory loss: absent, Confusion: absent, Seizures: absent; Trouble walking or imbalance: absent, Dizziness: present, Weakness: absent, Numbness absent, Tremor: absent, Spasm: absent, Speech difficulty: absent, Headache: absent, Light sensitivity: absent   Psychiatric Anxiety: present, Depression  absent, Suicidal ideations absent   Hematologic Abnormal bleeding: absent, Anemia: absent, Clotting disorder: absent, Lymph gland changes: absent
to function with many of these frequent tension type headaches. She also has had neck pain with the spasms and tizanidine has helped. Of note; she has family history of migraine headaches in her grandmother and paternal aunt and sister. Medications trials: Failed Topamax, beta blockers and tricyclics (amitriptyline). Review of systems done by staff reviewed and pertinent positives include dizziness with lightheadedness and anxiety. He denied depression. Denied headaches, vertigo, diplopia, numbness and weakness. Current Outpatient Medications on File Prior to Visit   Medication Sig Dispense Refill    docusate sodium (COLACE) 100 MG capsule Take 1 capsule by mouth 2 times daily as needed for Constipation 60 capsule 0    cloNIDine (CATAPRES) 0.1 MG tablet Take 1 tablet by mouth 2 times daily 60 tablet 3    tiZANidine (ZANAFLEX) 4 MG tablet Take one tab nightly as needed for spasms 30 tablet 3    gabapentin (NEURONTIN) 400 MG capsule TAKE ONE CAP TWICE DAILY AT 9 AM AND 5 PM 60 capsule 3    butalbital-acetaminophen-caffeine (FIORICET, ESGIC) -40 MG per tablet Take one tab every day as needed for severe headache 40 tablet 0    Erenumab-aooe (AIMOVIG 140 DOSE) 70 MG/ML SOAJ Inject into the skin every 30 days      rizatriptan (MAXALT) 10 MG tablet Take 1 tablet by mouth once as needed for Migraine May repeat in 2 hours if needed 30 tablet 3     No current facility-administered medications on file prior to visit. Allergies: Gissell Felipe has No Known Allergies. Past Medical History:   Diagnosis Date    Headache 2003    MIGRAINES    Hypertension 05/2019    ON RX    Psoriasis     BEHIND EARS       Past Surgical History:   Procedure Laterality Date    CERVICAL LAMINECTOMY N/A 6/24/2019    POSTERIOR CERVICAL FUSION C3-4  (GLOBUS, SSEP EVOKES CONF# [id number] - ANEL. ) performed by Gisell Oconnell MD at [address]  2007, 2014    x2    CYST REMOVAL Left 2004

## 2019-07-12 ENCOUNTER — OFFICE VISIT (OUTPATIENT)
Dept: INTERNAL MEDICINE CLINIC | Age: 34
End: 2019-07-12
Payer: COMMERCIAL

## 2019-07-12 VITALS
HEART RATE: 83 BPM | WEIGHT: 139 LBS | HEIGHT: 64 IN | RESPIRATION RATE: 20 BRPM | OXYGEN SATURATION: 98 % | SYSTOLIC BLOOD PRESSURE: 120 MMHG | DIASTOLIC BLOOD PRESSURE: 80 MMHG | BODY MASS INDEX: 23.73 KG/M2

## 2019-07-12 DIAGNOSIS — G44.221 CHRONIC TENSION-TYPE HEADACHE, INTRACTABLE: ICD-10-CM

## 2019-07-12 DIAGNOSIS — J31.0 CHRONIC RHINITIS: ICD-10-CM

## 2019-07-12 DIAGNOSIS — M48.02 CERVICAL SPINAL STENOSIS: ICD-10-CM

## 2019-07-12 DIAGNOSIS — M50.20 CERVICAL DISCOGENIC PAIN SYNDROME: ICD-10-CM

## 2019-07-12 DIAGNOSIS — G44.211 INTRACTABLE EPISODIC TENSION-TYPE HEADACHE: ICD-10-CM

## 2019-07-12 DIAGNOSIS — M62.838 MUSCLE SPASM: ICD-10-CM

## 2019-07-12 DIAGNOSIS — I10 ESSENTIAL HYPERTENSION: ICD-10-CM

## 2019-07-12 DIAGNOSIS — M79.2 RADICULAR PAIN IN RIGHT ARM: ICD-10-CM

## 2019-07-12 DIAGNOSIS — Z09 HOSPITAL DISCHARGE FOLLOW-UP: Primary | ICD-10-CM

## 2019-07-12 DIAGNOSIS — G95.9 CERVICAL MYELOPATHY WITH CERVICAL RADICULOPATHY (HCC): ICD-10-CM

## 2019-07-12 DIAGNOSIS — M54.12 CERVICAL MYELOPATHY WITH CERVICAL RADICULOPATHY (HCC): ICD-10-CM

## 2019-07-12 DIAGNOSIS — G43.719 INTRACTABLE CHRONIC MIGRAINE WITHOUT AURA AND WITHOUT STATUS MIGRAINOSUS: ICD-10-CM

## 2019-07-12 DIAGNOSIS — Z98.890 S/P CERVICAL DISCECTOMY: ICD-10-CM

## 2019-07-12 PROBLEM — R93.89 ABNORMAL MRI: Status: RESOLVED | Noted: 2019-04-12 | Resolved: 2019-07-12

## 2019-07-12 PROCEDURE — 99495 TRANSJ CARE MGMT MOD F2F 14D: CPT | Performed by: FAMILY MEDICINE

## 2019-07-12 PROCEDURE — 1111F DSCHRG MED/CURRENT MED MERGE: CPT | Performed by: FAMILY MEDICINE

## 2019-07-12 ASSESSMENT — ENCOUNTER SYMPTOMS
ALLERGIC/IMMUNOLOGIC NEGATIVE: 1
GASTROINTESTINAL NEGATIVE: 1
EYES NEGATIVE: 1
RESPIRATORY NEGATIVE: 1

## 2019-07-12 NOTE — PROGRESS NOTES
A user error has taken place: encounter opened in error, closed for administrative reasons.
has recently been seen by her neurologist.  She says that she continues to have some mild tingling in the right hand that is expected to improve over time. She continues to be in a C-spine collar and is having prospective follow-up appointment with a spine surgeon. Hypertension well controlled. Continue clonidine  Underlying history of anxiety. Patient in need of lots of reassurance. History of chronic stress headache  Migraine headache superimposed with stress headache. She is advised to continue medication aspirin neurology. Her neurologist suggested to get off her migraine medication, however, she was to continue for now  Med list reviewed advised to continue  Recent labs reviewed, discussed with patient, questions answered. Reassurance provided  She is encouraged to call for any concerns  This note is created with a voice recognition program and while intend to generate a document that accurately reflects the content of the visit, no guarantee can be provided that every mistake has been identified and corrected by editing.             Wicho Hernandez MD

## 2019-08-11 PROBLEM — Z09 HOSPITAL DISCHARGE FOLLOW-UP: Status: RESOLVED | Noted: 2019-07-12 | Resolved: 2019-08-11

## 2019-09-28 DIAGNOSIS — G44.221 CHRONIC TENSION-TYPE HEADACHE, INTRACTABLE: ICD-10-CM

## 2019-09-30 RX ORDER — BUTALBITAL, ACETAMINOPHEN AND CAFFEINE 50; 325; 40 MG/1; MG/1; MG/1
TABLET ORAL
Qty: 30 TABLET | Refills: 0 | Status: SHIPPED | OUTPATIENT
Start: 2019-09-30 | End: 2020-02-04 | Stop reason: SDUPTHER

## 2019-10-10 ENCOUNTER — OFFICE VISIT (OUTPATIENT)
Dept: INTERNAL MEDICINE CLINIC | Age: 34
End: 2019-10-10
Payer: COMMERCIAL

## 2019-10-10 VITALS
BODY MASS INDEX: 24.31 KG/M2 | RESPIRATION RATE: 20 BRPM | HEART RATE: 85 BPM | DIASTOLIC BLOOD PRESSURE: 86 MMHG | OXYGEN SATURATION: 99 % | SYSTOLIC BLOOD PRESSURE: 120 MMHG | WEIGHT: 142.4 LBS | HEIGHT: 64 IN

## 2019-10-10 DIAGNOSIS — I10 ESSENTIAL HYPERTENSION: ICD-10-CM

## 2019-10-10 DIAGNOSIS — M79.2 RADICULAR PAIN IN RIGHT ARM: ICD-10-CM

## 2019-10-10 DIAGNOSIS — Z98.890 S/P CERVICAL DISCECTOMY: ICD-10-CM

## 2019-10-10 DIAGNOSIS — M48.02 CERVICAL SPINAL STENOSIS: ICD-10-CM

## 2019-10-10 DIAGNOSIS — M50.20 CERVICAL DISCOGENIC PAIN SYNDROME: ICD-10-CM

## 2019-10-10 DIAGNOSIS — J31.0 CHRONIC RHINITIS: ICD-10-CM

## 2019-10-10 DIAGNOSIS — M62.838 MUSCLE SPASM: ICD-10-CM

## 2019-10-10 DIAGNOSIS — G43.719 INTRACTABLE CHRONIC MIGRAINE WITHOUT AURA AND WITHOUT STATUS MIGRAINOSUS: Primary | ICD-10-CM

## 2019-10-10 PROBLEM — G95.9 CERVICAL MYELOPATHY WITH CERVICAL RADICULOPATHY (HCC): Status: RESOLVED | Noted: 2019-06-24 | Resolved: 2019-10-10

## 2019-10-10 PROBLEM — G44.221 CHRONIC TENSION-TYPE HEADACHE, INTRACTABLE: Status: RESOLVED | Noted: 2018-03-07 | Resolved: 2019-10-10

## 2019-10-10 PROBLEM — M54.12 CERVICAL MYELOPATHY WITH CERVICAL RADICULOPATHY (HCC): Status: RESOLVED | Noted: 2019-06-24 | Resolved: 2019-10-10

## 2019-10-10 PROBLEM — G44.211 INTRACTABLE EPISODIC TENSION-TYPE HEADACHE: Status: RESOLVED | Noted: 2017-03-14 | Resolved: 2019-10-10

## 2019-10-10 PROCEDURE — 99214 OFFICE O/P EST MOD 30 MIN: CPT | Performed by: FAMILY MEDICINE

## 2019-10-10 ASSESSMENT — ENCOUNTER SYMPTOMS
GASTROINTESTINAL NEGATIVE: 1
ALLERGIC/IMMUNOLOGIC NEGATIVE: 1
RESPIRATORY NEGATIVE: 1
EYES NEGATIVE: 1

## 2019-10-17 ENCOUNTER — TELEPHONE (OUTPATIENT)
Dept: NEUROLOGY | Age: 34
End: 2019-10-17

## 2019-10-17 RX ORDER — CLONIDINE HYDROCHLORIDE 0.1 MG/1
TABLET ORAL
Qty: 180 TABLET | Refills: 0 | Status: SHIPPED | OUTPATIENT
Start: 2019-10-17 | End: 2020-01-22

## 2019-10-21 DIAGNOSIS — G43.109 MIGRAINE WITH AURA AND WITHOUT STATUS MIGRAINOSUS, NOT INTRACTABLE: Primary | ICD-10-CM

## 2019-11-13 DIAGNOSIS — M62.838 MUSCLE SPASM: ICD-10-CM

## 2019-11-13 DIAGNOSIS — G44.221 CHRONIC TENSION-TYPE HEADACHE, INTRACTABLE: ICD-10-CM

## 2019-11-15 RX ORDER — GABAPENTIN 400 MG/1
CAPSULE ORAL
Qty: 60 CAPSULE | Refills: 3 | Status: SHIPPED | OUTPATIENT
Start: 2019-11-15 | End: 2020-02-04

## 2019-11-15 RX ORDER — TIZANIDINE 4 MG/1
TABLET ORAL
Qty: 30 TABLET | Refills: 3 | Status: SHIPPED | OUTPATIENT
Start: 2019-11-15 | End: 2020-02-04 | Stop reason: DRUGHIGH

## 2020-01-23 RX ORDER — CLONIDINE HYDROCHLORIDE 0.1 MG/1
TABLET ORAL
Qty: 180 TABLET | Refills: 1 | Status: SHIPPED | OUTPATIENT
Start: 2020-01-23 | End: 2020-03-18 | Stop reason: SDUPTHER

## 2020-02-04 ENCOUNTER — OFFICE VISIT (OUTPATIENT)
Dept: NEUROLOGY | Age: 35
End: 2020-02-04
Payer: COMMERCIAL

## 2020-02-04 VITALS
WEIGHT: 137 LBS | HEIGHT: 64 IN | BODY MASS INDEX: 23.39 KG/M2 | SYSTOLIC BLOOD PRESSURE: 121 MMHG | DIASTOLIC BLOOD PRESSURE: 94 MMHG | HEART RATE: 86 BPM

## 2020-02-04 PROCEDURE — 99214 OFFICE O/P EST MOD 30 MIN: CPT | Performed by: PSYCHIATRY & NEUROLOGY

## 2020-02-04 RX ORDER — TIZANIDINE 4 MG/1
TABLET ORAL
Qty: 45 TABLET | Refills: 3 | Status: SHIPPED | OUTPATIENT
Start: 2020-02-04 | End: 2020-03-18 | Stop reason: SDUPTHER

## 2020-02-04 RX ORDER — GABAPENTIN 300 MG/1
CAPSULE ORAL
Qty: 90 CAPSULE | Refills: 3 | Status: SHIPPED | OUTPATIENT
Start: 2020-02-04 | End: 2020-03-18 | Stop reason: SDUPTHER

## 2020-02-04 RX ORDER — BUTALBITAL, ACETAMINOPHEN AND CAFFEINE 50; 325; 40 MG/1; MG/1; MG/1
TABLET ORAL
Qty: 40 TABLET | Refills: 0 | Status: SHIPPED | OUTPATIENT
Start: 2020-02-04 | End: 2020-03-22 | Stop reason: SDUPTHER

## 2020-02-04 NOTE — PROGRESS NOTES
NEUROLOGY FOLLOW-UP  Patient Name:  Angely Villegas  :   1985  Clinic Visit Date: 2020    I saw Ms. Angely Villegas in follow-up in the office today in continuation of neurologic care. She is a 30 y.o. right handed  female with intermittent migrainous attacks superimposed on chronic tension type headaches; on  bid + Tizanidine & Maxalt prn. She had cervical myelopathy s/p cervical spinal fusion on 2019 with significant improvement in tension type headaches for few months. Again she started having reemergence of tension type headaches with neck pain on right side radiating to top of the head. This is happening for the past few weeks. She also has muscle spasms and she has been taking tizanidine. Whenever she misses the evening dose of tizanidine she has much more increased spasms and neck pain. She denied radicular symptomatology and bladder dysfunction. She is not having any side effects from tizanidine. She has had posterior cervical fusion surgery for cervical radiculopathy on 2019. She had significant improvement for couple of months in neck pain and headaches. Of note; she had hx of chronic daily headaches with \"headaches for at least 15 years since teenage years\". With superimposed intermittent migrainous attacks; has been on gabapentin 400 bid, tizanidine nightly for headache prophylaxis. She is also on Maxalt alternating with butalbital for rescue therapy. She is also on Aimovig 70mg once monthly injn preventive therapy. She had suffered from neck pain with right upper extremity weakness prior to surgery in 2019. She has had limitation of neck movement. She also admits to having intermittent pain radiating down right upper extremity. Denies bladder dysfunction but admits to having clumsiness with occasional gait disturbances with ongoing neck pain and right shoulder pain. She has tried NSAIDs with no relief.     Her migraines occur with evening dose at the same dose. To help for tension type headaches; will increase the dose of gabapentin slightly. She will be increasing it from  bid to  tid. She will call our office in the month of March to update us about symptom relief. She will continue Maxalt prn for migraine rescue therapy and Emgality 120 mg once monthly for migraine prophylaxis. Hx of intolerance with steroids with increased anxiety    Follow up in 4 months. Please note that portions of this note were completed with a voice recognition program.  Although every effort was made to ensure the accuracy of this  automated transcription, some errors in transcription may have occurred, occasionally words are mis-transcribed.

## 2020-02-04 NOTE — PROGRESS NOTES
NEUROLOGY FOLLOW-UP  Patient Name:  Shady Steele  :   1985  Clinic Visit Date: 2020        REVIEW OF SYSTEMS  Constitutional Weight changes: absent, Fevers : absent, Fatigue: absent; Any recent hospitalizations:  absent.    HEENT Ears: normal, Eyes: no correction, Visual disturbance: absent   Reespiratory Shortness of breath: absent, Cough: absent   Cardivascular Chest pain: absent, Leg swelling :absent   GI Constipation: present, Diarrhea: absent, Swallowing change: absent    Urinary frequency: absent, Urinary urgency: absent, Urinary incontinence: absent   Musculoskeletal Neck pain: present, Back pain: absent, Stiffness: absent, Muscle pain: absent, Joint pain: absent   Dermatological Hair loss: absent, Skin changes: absent   Neurological Memory loss: absent, Confusion: absent, Seizures: absent; Trouble walking or imbalance: absent, Dizziness: absent, Weakness: absent, Numbness absent, Tremor: absent, Spasm: absent, Speech difficulty: absent, Headache: present, Light sensitivity: absent   Psychiatric Anxiety: present, Depression  absent, Suicidal ideations absent   Hematologic Abnormal bleeding: absent, Anemia: absent, Clotting disorder: absent, Lymph gland changes: absent

## 2020-02-11 ENCOUNTER — OFFICE VISIT (OUTPATIENT)
Dept: INTERNAL MEDICINE CLINIC | Age: 35
End: 2020-02-11
Payer: COMMERCIAL

## 2020-02-11 VITALS
HEIGHT: 64 IN | SYSTOLIC BLOOD PRESSURE: 110 MMHG | OXYGEN SATURATION: 100 % | DIASTOLIC BLOOD PRESSURE: 80 MMHG | RESPIRATION RATE: 16 BRPM | WEIGHT: 141.8 LBS | BODY MASS INDEX: 24.21 KG/M2 | TEMPERATURE: 97.5 F | HEART RATE: 67 BPM

## 2020-02-11 PROBLEM — M50.20 CERVICAL DISCOGENIC PAIN SYNDROME: Status: RESOLVED | Noted: 2019-07-12 | Resolved: 2020-02-11

## 2020-02-11 PROBLEM — M79.2 RADICULAR PAIN IN RIGHT ARM: Status: RESOLVED | Noted: 2019-04-08 | Resolved: 2020-02-11

## 2020-02-11 PROBLEM — M48.02 CERVICAL SPINAL STENOSIS: Status: RESOLVED | Noted: 2019-04-12 | Resolved: 2020-02-11

## 2020-02-11 PROBLEM — F41.1 GAD (GENERALIZED ANXIETY DISORDER): Status: ACTIVE | Noted: 2020-02-11

## 2020-02-11 PROCEDURE — 99214 OFFICE O/P EST MOD 30 MIN: CPT | Performed by: FAMILY MEDICINE

## 2020-02-11 ASSESSMENT — ENCOUNTER SYMPTOMS
ALLERGIC/IMMUNOLOGIC NEGATIVE: 1
EYES NEGATIVE: 1
GASTROINTESTINAL NEGATIVE: 1
RESPIRATORY NEGATIVE: 1

## 2020-02-11 ASSESSMENT — PATIENT HEALTH QUESTIONNAIRE - PHQ9
1. LITTLE INTEREST OR PLEASURE IN DOING THINGS: 0
SUM OF ALL RESPONSES TO PHQ QUESTIONS 1-9: 0
SUM OF ALL RESPONSES TO PHQ9 QUESTIONS 1 & 2: 0
2. FEELING DOWN, DEPRESSED OR HOPELESS: 0
SUM OF ALL RESPONSES TO PHQ QUESTIONS 1-9: 0

## 2020-02-11 NOTE — PROGRESS NOTES
Subjective:      Patient ID: Angely Villegas is a 30 y.o. female. Migraine    This is a chronic problem. The current episode started more than 1 month ago. The problem occurs daily. The problem has been waxing and waning. The pain is located in the bilateral region. The pain does not radiate. The pain quality is similar to prior headaches. The quality of the pain is described as dull and sharp. The pain is at a severity of 5/10. The pain is moderate. Associated symptoms include neck pain. The symptoms are aggravated by emotional stress. Treatments tried: Fioricet, Zanaflex, clonidine, gabapentin. The treatment provided moderate relief. Her past medical history is significant for migraine headaches. Review of Systems   Constitutional: Negative. HENT: Negative. Eyes: Negative. Respiratory: Negative. Cardiovascular: Negative. Gastrointestinal: Negative. Endocrine: Negative. Musculoskeletal: Positive for arthralgias, myalgias and neck pain. Skin: Negative. Allergic/Immunologic: Negative. Neurological: Negative. Hematological: Negative. Psychiatric/Behavioral: Positive for sleep disturbance. The patient is nervous/anxious. Past family and social history unremarkable. Diagnosis Orders   1. Chronic rhinitis     2. Intractable chronic migraine without aura and without status migrainosus     3. Muscle spasm     4. Cervical spinal stenosis     5. Essential hypertension     6. S/P cervical discectomy     7. NISH (generalized anxiety disorder)           Objective:   Physical Exam  Vitals signs and nursing note reviewed. Constitutional:       Appearance: She is well-developed. HENT:      Head: Normocephalic and atraumatic. Right Ear: External ear normal.      Left Ear: External ear normal.   Eyes:      Conjunctiva/sclera: Conjunctivae normal.      Pupils: Pupils are equal, round, and reactive to light. Neck:      Musculoskeletal: Normal range of motion and neck supple. agrees to be seen by mental health service. 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 Med list and available labs reviewed, discussed with patient, questions answered  Hemodynamically stable. Continue clonidine with positive response. Consume less than 2 g of salt a day  She is encouraged to call for any concern  She is advised to update her Pap and breast exam with her GYN  This note is created with a voice recognition program and while intend to generate a document that accurately reflects the content of the visit, no guarantee can be provided that every mistake has been identified and corrected by editing.        Ernesto Felix MD

## 2020-02-18 ENCOUNTER — PATIENT MESSAGE (OUTPATIENT)
Dept: NEUROLOGY | Age: 35
End: 2020-02-18

## 2020-02-18 NOTE — TELEPHONE ENCOUNTER
The Zanaflex prescription written 2/4/2020 failed to go to the pharmacy. I called it into [facility]. I spoke with Jenny Ahmadi.

## 2020-03-18 RX ORDER — CLONIDINE HYDROCHLORIDE 0.1 MG/1
TABLET ORAL
Qty: 180 TABLET | Refills: 1 | Status: SHIPPED | OUTPATIENT
Start: 2020-03-18 | End: 2020-09-30

## 2020-03-18 NOTE — TELEPHONE ENCOUNTER
Jeanette's prescriptions need to reissued to [facility]. She had changed to them. They request 90 day supply whenever possible. Her next appointment is 7/16/2020.

## 2020-03-18 NOTE — TELEPHONE ENCOUNTER
Last filled 1/23/2020 #180 with 1 RF  Last seen 2/11/2020    Next Visit Date:  Future Appointments   Date Time Provider Rupesh Quarles   7/16/2020 10:40 AM Sabina Chau MD Neuro Spec [id number]   8/11/2020 11:00 AM Sonny Blanco  [address] Maintenance   Topic Date Due    Varicella vaccine (1 of 2 - 2-dose childhood series) 02/02/1986    HIV screen  07/12/2020 (Originally 2/2/2001)    Flu vaccine (1) 02/11/2021 (Originally 9/1/2019)    Potassium monitoring  06/25/2020    Creatinine monitoring  06/25/2020    Cervical cancer screen  10/27/2020    DTaP/Tdap/Td vaccine (3 - Td) 12/20/2026    Shingles Vaccine (1 of 2) 02/02/2036    Hepatitis A vaccine  Aged Out    Hepatitis B vaccine  Aged Out    Hib vaccine  Aged Out    Meningococcal (ACWY) vaccine  Aged Out    Pneumococcal 0-64 years Vaccine  Aged Out       No results found for: LABA1C          ( goal A1C is < 7)   No results found for: LABMICR  LDL Calculated (mg/dL)   Date Value   01/06/2017 108       (goal LDL is <100)   AST (U/L)   Date Value   06/25/2019 17     ALT (U/L)   Date Value   06/25/2019 9     BUN (mg/dL)   Date Value   06/25/2019 11     BP Readings from Last 3 Encounters:   02/11/20 110/80   02/04/20 (!) 121/94   10/10/19 120/86          (goal 120/80)    All Future Testing planned in [facility]  Lab Frequency Next Occurrence               Patient Active Problem List:     Chronic rhinitis     Intractable chronic migraine without aura and without status migrainosus     Muscle spasm     Essential hypertension     S/P cervical discectomy     NISH (generalized anxiety disorder)

## 2020-03-22 RX ORDER — TIZANIDINE 4 MG/1
TABLET ORAL
Qty: 135 TABLET | Refills: 1 | Status: SHIPPED | OUTPATIENT
Start: 2020-03-22 | End: 2020-07-16 | Stop reason: SDUPTHER

## 2020-03-22 RX ORDER — BUTALBITAL, ACETAMINOPHEN AND CAFFEINE 50; 325; 40 MG/1; MG/1; MG/1
TABLET ORAL
Qty: 40 TABLET | Refills: 0 | Status: SHIPPED | OUTPATIENT
Start: 2020-03-22 | End: 2020-07-06 | Stop reason: SDUPTHER

## 2020-03-22 RX ORDER — RIZATRIPTAN BENZOATE 10 MG/1
10 TABLET ORAL
Qty: 9 TABLET | Refills: 3 | Status: SHIPPED | OUTPATIENT
Start: 2020-03-22 | End: 2020-10-23 | Stop reason: SDUPTHER

## 2020-03-22 RX ORDER — GABAPENTIN 300 MG/1
CAPSULE ORAL
Qty: 270 CAPSULE | Refills: 1 | Status: SHIPPED | OUTPATIENT
Start: 2020-03-22 | End: 2020-07-16 | Stop reason: SDUPTHER

## 2020-07-02 RX ORDER — GALCANEZUMAB 120 MG/ML
INJECTION, SOLUTION SUBCUTANEOUS
Qty: 3 PEN | Refills: 2 | Status: SHIPPED | OUTPATIENT
Start: 2020-07-02 | End: 2020-12-22 | Stop reason: SDUPTHER

## 2020-07-06 RX ORDER — BUTALBITAL, ACETAMINOPHEN AND CAFFEINE 50; 325; 40 MG/1; MG/1; MG/1
TABLET ORAL
Qty: 40 TABLET | Refills: 0 | Status: SHIPPED | OUTPATIENT
Start: 2020-07-06 | End: 2020-10-23 | Stop reason: SDUPTHER

## 2020-07-06 NOTE — TELEPHONE ENCOUNTER
Pharmacy requesting refill of Fioricet. Medication active on med list yes      Date last ordered: 3/22/2020  verified on 7/6/2020   verified by ZULEMA Santiago LPN      Date of last appointment 2/4/2020    Next Visit Date:  7/16/2020.

## 2020-07-16 ENCOUNTER — OFFICE VISIT (OUTPATIENT)
Dept: NEUROLOGY | Age: 35
End: 2020-07-16
Payer: COMMERCIAL

## 2020-07-16 VITALS
HEIGHT: 64 IN | HEART RATE: 77 BPM | TEMPERATURE: 98.1 F | SYSTOLIC BLOOD PRESSURE: 140 MMHG | WEIGHT: 148 LBS | DIASTOLIC BLOOD PRESSURE: 97 MMHG | BODY MASS INDEX: 25.27 KG/M2

## 2020-07-16 PROCEDURE — 99214 OFFICE O/P EST MOD 30 MIN: CPT | Performed by: PSYCHIATRY & NEUROLOGY

## 2020-07-16 RX ORDER — PREDNISONE 10 MG/1
TABLET ORAL
Qty: 16 TABLET | Refills: 0 | Status: SHIPPED | OUTPATIENT
Start: 2020-07-16 | End: 2020-08-11 | Stop reason: ALTCHOICE

## 2020-07-16 RX ORDER — GABAPENTIN 300 MG/1
CAPSULE ORAL
Qty: 270 CAPSULE | Refills: 1 | Status: SHIPPED | OUTPATIENT
Start: 2020-07-16 | End: 2020-10-23 | Stop reason: SDUPTHER

## 2020-07-16 RX ORDER — TIZANIDINE 4 MG/1
TABLET ORAL
Qty: 135 TABLET | Refills: 1 | Status: SHIPPED | OUTPATIENT
Start: 2020-07-16 | End: 2020-10-23 | Stop reason: SDUPTHER

## 2020-07-16 NOTE — PROGRESS NOTES
right shoulder pain. She has tried NSAIDs with no relief. Her migraines occur with aura consisted of severe left parietotemporal throbbing pains associated with photophobia and phonophobia and nausea lasting several hours of the day. Her frequent headaches were described as a pressure like headaches with mild to moderate severity associated with the photophobia only and not associated with nausea and vomiting and able to function with many of these frequent tension type headaches. She also has had neck pain with the spasms and tizanidine has helped. Of note; she has family history of migraine headaches in her grandmother and paternal aunt and sister. Medications trials: Failed Topamax, beta blockers and tricyclics (amitriptyline). Review of systems done by staff reviewed and pertinent positives include neck pain, headaches, right arm pain, dizziness with lightheadedness and anxiety. Denied anxiety/depression. Denied vertigo, diplopia. Current Outpatient Medications on File Prior to Visit   Medication Sig Dispense Refill    butalbital-acetaminophen-caffeine (FIORICET, ESGIC) -40 MG per tablet TAKE ONE TABLET BY MOUTH DAILY AS NEEDED FOR SEVER HEADACHE 40 tablet 0    Galcanezumab-gnlm (EMGALITY) 120 MG/ML SOAJ INJECT UNDER THE SKIN ONCE A MONTH 3 pen 2    tiZANidine (ZANAFLEX) 4 MG tablet Take one tab nightly and half tab in am as needed for spasms 135 tablet 1    rizatriptan (MAXALT) 10 MG tablet Take 1 tablet by mouth once as needed for Migraine May repeat in 2 hours if needed 9 tablet 3    gabapentin (NEURONTIN) 300 MG capsule TAKE ONE CAP three times daily at 8 am, 2 pm and 8 pm 270 capsule 1    cloNIDine (CATAPRES) 0.1 MG tablet TAKE ONE TABLET BY MOUTH TWICE A  tablet 1    docusate sodium (COLACE) 100 MG capsule Take 1 capsule by mouth 2 times daily as needed for Constipation 60 capsule 0     No current facility-administered medications on file prior to visit. Allergies: Mario Cruz has No Known Allergies. Past Medical History:   Diagnosis Date    Headache 2003    MIGRAINES    Hypertension 05/2019    ON RX    Psoriasis     BEHIND EARS       Past Surgical History:   Procedure Laterality Date    CERVICAL LAMINECTOMY N/A 6/24/2019    POSTERIOR CERVICAL FUSION C3-4  (GLOBUS, SSEP EVOKES CONF# [id number] - ANEL. ) performed by Karishma Whitten MD at [address]  2007, 2014    x2    CYST REMOVAL Left 2004    breast    NECK SURGERY  06/24/2019    POSTERIOR CERVICAL FUSION C3-4      TUBAL LIGATION  2014     Social History: Mario Cruz  reports that she has never smoked. She has never used smokeless tobacco. She reports current alcohol use. She reports that she does not use drugs. Family History   Problem Relation Age of Onset    High Blood Pressure Father     Migraines Sister     Migraines Paternal Grandmother     Heart Disease Paternal Grandmother     Cancer Paternal Grandfather         brain     On exam: Blood pressure (!) 140/97, pulse 77, temperature 98.1 °F (36.7 °C), temperature source Temporal, height 5' 4\" (1.626 m), weight 148 lb (67.1 kg), not currently breastfeeding. GENERAL  Appears comfortable and in no distress   HEENT  presently wearing cervical collar   NECK  Supple and no bruits heard   MENTAL STATUS:  Alert, oriented, intact memory, no confusion, normal speech, normal language, no hallucination or delusion; Appropriate affect.      CRANIAL NERVES: II     -      PERRLA; VA 20/20 OU., Visual fields intact to confrontation  III,IV,VI -  EOMs full, no afferent defect, no  KEHINDE, no ptosis  V     -     Normal facial sensation  VII    -     Normal facial symmetry  VIII   -     Intact hearing  IX,X -     Symmetrical palate  XI    -     Symmetrical shoulder shrug  XII   -     Midline tongue, no atrophy    MOTOR FUNCTION:  significant for good strength of grade 5/5 in bilateral proximal and distal muscle groups of both upper and lower extremities with normal bulk, normal tone and no involuntary movements, no tremor; Spurling's sign equivocal. Parks's sign negative. SENSORY FUNCTION:  Normal touch, normal pin, normal vibration, normal proprioception   CEREBELLAR FUNCTION:  Intact fine motor control over upper limbs   REFLEX FUNCTION:  Symmetric and brisk DTRs with no parks sign; no Babinski sign   STATION and GAIT  Normal station, normal gait       Diagnostic data reviewed with the patient:   MRI Brain (w/wo) (1/31/17): No intracranial abnormality. Normal MRI of the brain. CBC:     Lab Results   Component Value Date    WBC 15.4 (H) 06/25/2019    HGB 13.7 06/25/2019     06/25/2019      BMP:     Lab Results   Component Value Date     06/25/2019    K 5.3 06/25/2019    GLUCOSE 152 (H) 06/25/2019    CALCIUM 9.3 06/25/2019         Lab Results   Component Value Date    CHOL 179 01/06/2017    HDL 57 01/06/2017    TRIG 71 01/06/2017    ALT 9 06/25/2019    AST 17 06/25/2019     MRI cervical spine 1/18/19: Multiple levels with small disc protrusions in cervical spine without significant canal stenosis or neural foraminal narrowing. No demyelinating plaques in cervical cord. No abnormal enhancement with IV contrast.      EMG Rt UE (1/24/19): There is electrophysiologic evidence of mild right C6/C7 cervical radiculopathy. This study also demonstrated subtle median neuropathy at right wrist suggestive of right carpal tunnel syndrome of mild severity.              Impression and Plan: Ms. Claudia Gupta is a 30 y.o. female with   Headaches, neck pain, radicular pain in right upper extremity extending to last 2 digits with progression for the past few weeks; waiting for appointment with surgeon; during the interim will start her on short course of smaller dose of steroids  Hx of cervical spinal fusion in June 2019  Intractable tension type headaches with superimposed intermittent migrainous attacks: continue  TID. she will continue Maxalt prn for migraine rescue therapy and Emgality 120 mg once monthly for migraine prophylaxis. Follow up in 3-4 months. Please note that portions of this note were completed with a voice recognition program.  Although every effort was made to ensure the accuracy of this  automated transcription, some errors in transcription may have occurred, occasionally words are mis-transcribed.

## 2020-08-11 ENCOUNTER — HOSPITAL ENCOUNTER (OUTPATIENT)
Age: 35
Setting detail: SPECIMEN
Discharge: HOME | End: 2020-08-11
Payer: COMMERCIAL

## 2020-08-11 ENCOUNTER — OFFICE VISIT (OUTPATIENT)
Dept: INTERNAL MEDICINE CLINIC | Age: 35
End: 2020-08-11
Payer: COMMERCIAL

## 2020-08-11 VITALS
HEART RATE: 87 BPM | DIASTOLIC BLOOD PRESSURE: 97 MMHG | TEMPERATURE: 97.2 F | OXYGEN SATURATION: 97 % | BODY MASS INDEX: 24.92 KG/M2 | WEIGHT: 146 LBS | SYSTOLIC BLOOD PRESSURE: 135 MMHG | HEIGHT: 64 IN

## 2020-08-11 LAB
ALBUMIN SERPL-MCNC: 4.4 G/DL (ref 3.5–5.2)
ALBUMIN/GLOBULIN RATIO: 1.8 (ref 1–2.5)
ALP BLD-CCNC: 49 U/L (ref 35–104)
ALT SERPL-CCNC: 11 U/L (ref 5–33)
ANION GAP SERPL CALCULATED.3IONS-SCNC: 12 MMOL/L (ref 9–17)
AST SERPL-CCNC: 17 U/L
BILIRUB SERPL-MCNC: 0.59 MG/DL (ref 0.3–1.2)
BUN BLDV-MCNC: 12 MG/DL (ref 6–20)
BUN/CREAT BLD: NORMAL (ref 9–20)
CALCIUM SERPL-MCNC: 10 MG/DL (ref 8.6–10.4)
CHLORIDE BLD-SCNC: 102 MMOL/L (ref 98–107)
CHOLESTEROL/HDL RATIO: 3
CHOLESTEROL: 199 MG/DL
CO2: 24 MMOL/L (ref 20–31)
CREAT SERPL-MCNC: 0.86 MG/DL (ref 0.5–0.9)
GFR AFRICAN AMERICAN: >60 ML/MIN
GFR NON-AFRICAN AMERICAN: >60 ML/MIN
GFR SERPL CREATININE-BSD FRML MDRD: NORMAL ML/MIN/{1.73_M2}
GFR SERPL CREATININE-BSD FRML MDRD: NORMAL ML/MIN/{1.73_M2}
GLUCOSE BLD-MCNC: 98 MG/DL (ref 70–99)
HCT VFR BLD CALC: 39.4 % (ref 36.3–47.1)
HDLC SERPL-MCNC: 66 MG/DL
HEMOGLOBIN: 14.2 G/DL (ref 11.9–15.1)
LDL CHOLESTEROL: 100 MG/DL (ref 0–130)
MCH RBC QN AUTO: 33.4 PG (ref 25.2–33.5)
MCHC RBC AUTO-ENTMCNC: 36 G/DL (ref 28.4–34.8)
MCV RBC AUTO: 92.7 FL (ref 82.6–102.9)
NRBC AUTOMATED: 0 PER 100 WBC
PDW BLD-RTO: 11.8 % (ref 11.8–14.4)
PLATELET # BLD: 238 K/UL (ref 138–453)
PMV BLD AUTO: 10.9 FL (ref 8.1–13.5)
POTASSIUM SERPL-SCNC: 4.5 MMOL/L (ref 3.7–5.3)
RBC # BLD: 4.25 M/UL (ref 3.95–5.11)
SODIUM BLD-SCNC: 138 MMOL/L (ref 135–144)
TOTAL PROTEIN: 6.9 G/DL (ref 6.4–8.3)
TRIGL SERPL-MCNC: 167 MG/DL
TSH SERPL DL<=0.05 MIU/L-ACNC: 3.19 MIU/L (ref 0.3–5)
VITAMIN D 25-HYDROXY: 51.6 NG/ML (ref 30–100)
VLDLC SERPL CALC-MCNC: ABNORMAL MG/DL (ref 1–30)
WBC # BLD: 6.7 K/UL (ref 3.5–11.3)

## 2020-08-11 PROCEDURE — 99214 OFFICE O/P EST MOD 30 MIN: CPT | Performed by: FAMILY MEDICINE

## 2020-08-11 ASSESSMENT — ENCOUNTER SYMPTOMS
EYES NEGATIVE: 1
GASTROINTESTINAL NEGATIVE: 1
RESPIRATORY NEGATIVE: 1
ALLERGIC/IMMUNOLOGIC NEGATIVE: 1

## 2020-08-11 ASSESSMENT — PATIENT HEALTH QUESTIONNAIRE - PHQ9
3. TROUBLE FALLING OR STAYING ASLEEP: 2
4. FEELING TIRED OR HAVING LITTLE ENERGY: 3
SUM OF ALL RESPONSES TO PHQ9 QUESTIONS 1 & 2: 3
1. LITTLE INTEREST OR PLEASURE IN DOING THINGS: 0
5. POOR APPETITE OR OVEREATING: 0
9. THOUGHTS THAT YOU WOULD BE BETTER OFF DEAD, OR OF HURTING YOURSELF: 0
SUM OF ALL RESPONSES TO PHQ QUESTIONS 1-9: 10
2. FEELING DOWN, DEPRESSED OR HOPELESS: 3
SUM OF ALL RESPONSES TO PHQ QUESTIONS 1-9: 10
10. IF YOU CHECKED OFF ANY PROBLEMS, HOW DIFFICULT HAVE THESE PROBLEMS MADE IT FOR YOU TO DO YOUR WORK, TAKE CARE OF THINGS AT HOME, OR GET ALONG WITH OTHER PEOPLE: 1
8. MOVING OR SPEAKING SO SLOWLY THAT OTHER PEOPLE COULD HAVE NOTICED. OR THE OPPOSITE, BEING SO FIGETY OR RESTLESS THAT YOU HAVE BEEN MOVING AROUND A LOT MORE THAN USUAL: 0
6. FEELING BAD ABOUT YOURSELF - OR THAT YOU ARE A FAILURE OR HAVE LET YOURSELF OR YOUR FAMILY DOWN: 2
7. TROUBLE CONCENTRATING ON THINGS, SUCH AS READING THE NEWSPAPER OR WATCHING TELEVISION: 0

## 2020-08-11 NOTE — PROGRESS NOTES
Subjective:      Patient ID: Eddi Agustin is a 30 y.o. female. Neck Pain    This is a chronic problem. The current episode started more than 1 month ago. The problem occurs constantly. The problem has been waxing and waning. The pain is present in the midline. The quality of the pain is described as cramping. The pain is at a severity of 6/10. The pain is severe. The symptoms are aggravated by stress and position. The pain is same all the time. Stiffness is present all day. Associated symptoms include tingling. She has tried muscle relaxants (Gabapentin) for the symptoms. The treatment provided mild relief. Review of Systems   Constitutional: Negative. HENT: Negative. Eyes: Negative. Respiratory: Negative. Cardiovascular: Negative. Gastrointestinal: Negative. Endocrine: Negative. Musculoskeletal: Positive for arthralgias, myalgias and neck pain. Skin: Negative. Allergic/Immunologic: Negative. Neurological: Positive for tingling. Hematological: Negative. Psychiatric/Behavioral: Positive for dysphoric mood. The patient is nervous/anxious. Past family and social history unremarkable. Diagnosis Orders   1. Migraine with aura and without status migrainosus, not intractable  CBC    Comprehensive Metabolic Panel    Hemoglobin A1C    Lipid Panel    TSH without Reflex    Vitamin D 25 Hydroxy   2. Chronic rhinitis     3. Intractable chronic migraine without aura and without status migrainosus  Sacha Gan, PhD, Psychology, [place]   4. Muscle spasm     5. Chronic tension-type headache, intractable     6. Radicular pain in right arm     7. Essential hypertension  CBC    Comprehensive Metabolic Panel    Hemoglobin A1C    Lipid Panel    TSH without Reflex    Vitamin D 25 Hydroxy   8. S/P cervical discectomy     9. NISH (generalized anxiety disorder)  [facility]   10. Mood disorder (HCC)  [facility]acha [facility]   11.  Family history of diabetes mellitus (DM)  CBC    Comprehensive Metabolic Panel    Hemoglobin A1C    Lipid Panel    TSH without Reflex    Vitamin D 25 Hydroxy         Objective:   Physical Exam  Vitals signs and nursing note reviewed. Constitutional:       Appearance: She is well-developed. HENT:      Head: Normocephalic and atraumatic. Right Ear: External ear normal.      Left Ear: External ear normal.   Eyes:      Conjunctiva/sclera: Conjunctivae normal.      Pupils: Pupils are equal, round, and reactive to light. Neck:      Musculoskeletal: Normal range of motion and neck supple. Cardiovascular:      Rate and Rhythm: Normal rate and regular rhythm. Heart sounds: Normal heart sounds. Pulmonary:      Effort: Pulmonary effort is normal.      Breath sounds: Normal breath sounds. Abdominal:      General: Bowel sounds are normal.      Palpations: Abdomen is soft. Genitourinary:     Vagina: Normal.   Musculoskeletal: Normal range of motion. 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 Status post posterior C3/4 decompression. She is established with orthopedic spine surgery and contemplating decompression on C4-C5. Persistent pain despite muscle relaxant, gabapentin   Skin:     General: Skin is warm and dry. Neurological:      Mental Status: She is alert and oriented to person, place, and time. Deep Tendon Reflexes: Reflexes are normal and symmetric. Comments: Migraine without aura. Neurologically intact. She is established with neurology on a modality, Fioricet, gabapentin, muscle relaxant   Psychiatric:      Comments: Mood disorder with chronic pain syndrome         Assessment:       Diagnosis Orders   1. Migraine with aura and without status migrainosus, not intractable  CBC    Comprehensive Metabolic Panel    Hemoglobin A1C    Lipid Panel    TSH without Reflex    Vitamin D 25 Hydroxy   2. Chronic rhinitis     3.  Intractable chronic migraine without aura and without status migrainosus  [facility]   4. Muscle spasm     5. Chronic tension-type headache, intractable     6. Radicular pain in right arm     7. Essential hypertension  CBC    Comprehensive Metabolic Panel    Hemoglobin A1C    Lipid Panel    TSH without Reflex    Vitamin D 25 Hydroxy   8. S/P cervical discectomy     9. NISH (generalized anxiety disorder)  [facility]   10. Mood disorder (HCC)  [facility]ris [facility], [facility]   11. Family history of diabetes mellitus (DM)  CBC    Comprehensive Metabolic Panel    Hemoglobin A1C    Lipid Panel    TSH without Reflex    Vitamin D 25 Hydroxy           Plan:      29-year-old very anxious female is presented with subjective complaint of chronic ongoing neck pain without resolution. Afebrile, clinical examination is stable at baseline  History of posterior C3/C4 decompression. Patient states that her spine surgeon is recommending C4/C5 decompression that she is very worried about. She is advised to follow closely with surgery and neurology  Chronic pain syndrome. Patient states that her spine surgeon told her not to follow for established with pain management. I believe that in view of her chronicity of her symptoms despite decompression surgery, she would benefit from establishing with pain management. Patient states that she is compliant with gabapentin, clonidine muscle relaxant that she is tolerating well   Migraine without aura. She is established with neurology on Fioricet, gabapentin, Emgality and Maxalt. Keep headache diary, stress reduction, improve sleep hygiene and maintaining oral hydration  Osteopenia on calcium and vitamin D   Significant anxiety with depression. She was scheduled to be seen by psychiatry, however, he states that she was never contacted. She agrees to be seen by [facility] psychology  Hypertension with underlying significant stress and chronic pain syndrome.   Continue clonidine. Low-fat high-fiber diet, stress reduction  She denies tobacco, excessive alcohol or illicit drug use  Constipation with multiple pain medications. Continue Colace, more fruits and vegetables and over-the-counter fibers  She is updated on Pap and breast exam as provided by GYN  Family history of diabetes. She would benefit from low-fat high-fiber diet, daily moderate exercise  Further recommendations to follow  She is encouraged to call for any concern  This note is created with a voice recognition program and while intend to generate a document that accurately reflects the content of the visit, no guarantee can be provided that every mistake has been identified and corrected by editing.       Kanwal Murphy MD

## 2020-08-12 LAB
ESTIMATED AVERAGE GLUCOSE: 91 MG/DL
HBA1C MFR BLD: 4.8 % (ref 4–6)

## 2020-09-30 RX ORDER — CLONIDINE HYDROCHLORIDE 0.1 MG/1
TABLET ORAL
Qty: 180 TABLET | Refills: 0 | Status: SHIPPED | OUTPATIENT
Start: 2020-09-30 | End: 2020-12-30 | Stop reason: SDUPTHER

## 2020-09-30 NOTE — TELEPHONE ENCOUNTER
Cristiano Rico is calling to request a refill on the following medication(s):    Medication Request:    Last filled 3/18/2020 90 days with 0 RF    Requested Prescriptions     Pending Prescriptions Disp Refills    cloNIDine (CATAPRES) 0.1 MG tablet [Pharmacy Med Name: Clonidine Hydrochloride 0.1mg Tablet] 180 tablet 0     Sig: TAKE ONE TABLET BY MOUTH TWICE A DAY       Last Visit Date (If Applicable):  8/96/8965    Next Visit Date:    11/10/2020

## 2020-10-23 ENCOUNTER — OFFICE VISIT (OUTPATIENT)
Dept: NEUROLOGY | Age: 35
End: 2020-10-23
Payer: COMMERCIAL

## 2020-10-23 VITALS
TEMPERATURE: 98.2 F | WEIGHT: 147.2 LBS | HEART RATE: 83 BPM | SYSTOLIC BLOOD PRESSURE: 128 MMHG | HEIGHT: 64 IN | BODY MASS INDEX: 25.13 KG/M2 | DIASTOLIC BLOOD PRESSURE: 86 MMHG

## 2020-10-23 PROCEDURE — 99215 OFFICE O/P EST HI 40 MIN: CPT | Performed by: PSYCHIATRY & NEUROLOGY

## 2020-10-23 RX ORDER — LORAZEPAM 0.5 MG/1
TABLET ORAL
Qty: 3 TABLET | Refills: 0 | Status: SHIPPED | OUTPATIENT
Start: 2020-10-23 | End: 2020-11-10

## 2020-10-23 RX ORDER — GABAPENTIN 300 MG/1
CAPSULE ORAL
Qty: 270 CAPSULE | Refills: 1 | Status: SHIPPED | OUTPATIENT
Start: 2020-10-23 | End: 2020-12-22 | Stop reason: SDUPTHER

## 2020-10-23 RX ORDER — DICLOFENAC SODIUM 75 MG/1
TABLET, DELAYED RELEASE ORAL
COMMUNITY
Start: 2020-09-01 | End: 2020-12-22 | Stop reason: ALTCHOICE

## 2020-10-23 RX ORDER — RIZATRIPTAN BENZOATE 10 MG/1
10 TABLET ORAL
Qty: 9 TABLET | Refills: 3 | Status: SHIPPED | OUTPATIENT
Start: 2020-10-23 | End: 2020-12-22 | Stop reason: SDUPTHER

## 2020-10-23 RX ORDER — BUTALBITAL, ACETAMINOPHEN AND CAFFEINE 50; 325; 40 MG/1; MG/1; MG/1
TABLET ORAL
Qty: 40 TABLET | Refills: 0 | Status: SHIPPED | OUTPATIENT
Start: 2020-10-23 | End: 2020-12-22 | Stop reason: SDUPTHER

## 2020-10-23 RX ORDER — TIZANIDINE 4 MG/1
TABLET ORAL
Qty: 135 TABLET | Refills: 1 | Status: SHIPPED | OUTPATIENT
Start: 2020-10-23 | End: 2020-12-22 | Stop reason: SDUPTHER

## 2020-10-23 RX ORDER — PREDNISONE 10 MG/1
TABLET ORAL
Qty: 20 TABLET | Refills: 0 | Status: SHIPPED | OUTPATIENT
Start: 2020-10-23 | End: 2020-11-10

## 2020-10-23 NOTE — PROGRESS NOTES
Constitutional [] Weight loss/gain   [] Fatigue  [] Fever/Chills   HEENT [] Hearing Loss  [] Visual Disturbance  [] Tinnitus  [] Eye pain   Respiratory [] Shortness of Breath  [] Cough  [] Snoring   Cardiovascular [] Chest Pain  [] Palpitations  [] Lightheaded   GI [] Constipation  [] Diarrhea  [] Swallowing change  [] Nausea/vomiting    [] Urinary Frequency  [] Urinary Urgency   Musculoskeletal [x] Neck pain  [x] Back pain  [x] Muscle pain  [x] Restless legs   Dermatologic [] Skin changes   Neurologic [x] Memory loss/confusion  [] Seizures  [x] Trouble walking or imbalance  [] Dizziness  [] Sleep disturbance  [x] Weakness  [x] Numbness  [] Tremors  [] Speech Difficulty  [x] Headaches  [x] Light Sensitivity  [x] Sound Sensitivity   Endocrinology []Excessive thirst  []Excessive hunger   Psychiatric [] Anxiety/Depression  [] Hallucination   Allergy/immunology []Hives/environmental allergies   Hematologic/lymph [] Abnormal bleeding  [] Abnormal bruising

## 2020-10-23 NOTE — PROGRESS NOTES
NEUROLOGY FOLLOW-UP  Patient Name:  Tino Chamberlain  :   1985  Clinic Visit Date: 10/23/2020    Dear Dr. Ketan Yang MD       I saw Ms. Tino Chamberlain in follow-up in the office today in continuation of neurologic care. She  is a 30 y.o. right handed  female with intermittent migrainous attacks superimposed on chronic tension type headaches; on  tid + Tizanidine & Maxalt prn. She had cervical myelopathy s/p cervical spinal fusion on 2019 with significant improvement in tension type headaches. Today she comes to clinic stating that she has been having increasing numbness and weakness involving right side of the body for the past 2 months along with painful sensations involving right side of the body. She also has been having gait difficulties and increasing clumsy gait. She has been having electric shocklike sensation in the neck, radiating down the spine by bending the neck forward. She does have difficulty with fine motor control with the clumsiness in the hands for the past 2 months. Admits to having walking difficulty but has not had any falls. She presently is not suffering from bladder dysfunction. Also has been having ongoing tension type headaches with neck pain on right side radiating to top of the head. This has been occurring with increasing frequency and severity for the past few weeks. She also has muscle spasms and she has been taking tizanidine. Whenever she misses the evening dose of tizanidine she has much more increased spasms and neck pain. She has had posterior cervical fusion surgery for cervical radiculopathy on 2019. She had significant transient improvement in neck pain and headaches. Of note; she had hx of chronic daily headaches with \"headaches for at least 15 years since teenage years\". With superimposed intermittent migrainous attacks; has been on gabapentin 400 bid, tizanidine nightly for headache prophylaxis.   She is also on Maxalt alternating with butalbital for rescue therapy. She is also on Aimovig 70mg once monthly injn preventive therapy. She had suffered from neck pain with right upper extremity weakness prior to surgery in June 2019. She has had limitation of neck movement. She also admits to having intermittent pain radiating down right upper extremity. Denies bladder dysfunction but admits to having clumsiness with occasional gait disturbances with ongoing neck pain and right shoulder pain. She has tried NSAIDs with no relief. Her migraines occur with aura consisted of severe left parietotemporal throbbing pains associated with photophobia and phonophobia and nausea lasting several hours of the day. Her frequent headaches were described as a pressure like headaches with mild to moderate severity associated with the photophobia only and not associated with nausea and vomiting and able to function with many of these frequent tension type headaches. She also has had neck pain with the spasms and tizanidine has helped. Of note; she has family history of migraine headaches in her grandmother and paternal aunt and sister. She has 2 children, Guero (2011) and Ochoa James (2006)    Medications trials: Failed Topamax, beta blockers and tricyclics (amitriptyline). Review of systems done by staff reviewed and pertinent positives include neck pain, headaches, phobia, phonophobia, fatigue, right arm pain, dizziness with lightheadedness, clumsiness, gait difficulty and anxiety. Denied anxiety/depression. Denied vertigo, diplopia.          Current Outpatient Medications on File Prior to Visit   Medication Sig Dispense Refill    diclofenac (VOLTAREN) 75 MG EC tablet       cloNIDine (CATAPRES) 0.1 MG tablet TAKE ONE TABLET BY MOUTH TWICE A  tablet 0    Multiple Vitamins-Minerals (MULTIVITAMIN ADULT PO) Take by mouth daily      VITAMIN D PO Take by mouth daily      Calcium Carbonate Antacid (CALCIUM CARBONATE PO) Take by mouth daily      tiZANidine (ZANAFLEX) 4 MG tablet Take one tab nightly and half tab in am as needed for spasms 135 tablet 1    gabapentin (NEURONTIN) 300 MG capsule TAKE ONE CAP three times daily at 8 am, 2 pm and 8 pm 270 capsule 1    butalbital-acetaminophen-caffeine (FIORICET, ESGIC) -40 MG per tablet TAKE ONE TABLET BY MOUTH DAILY AS NEEDED FOR SEVER HEADACHE 40 tablet 0    Galcanezumab-gnlm (EMGALITY) 120 MG/ML SOAJ INJECT UNDER THE SKIN ONCE A MONTH 3 pen 2    rizatriptan (MAXALT) 10 MG tablet Take 1 tablet by mouth once as needed for Migraine May repeat in 2 hours if needed 9 tablet 3    docusate sodium (COLACE) 100 MG capsule Take 1 capsule by mouth 2 times daily as needed for Constipation (Patient not taking: Reported on 10/23/2020) 60 capsule 0     No current facility-administered medications on file prior to visit. Allergies: Zachary Alegria has No Known Allergies. Past Medical History:   Diagnosis Date    Headache 2003    MIGRAINES    Hypertension 05/2019    ON RX    Psoriasis     BEHIND EARS       Past Surgical History:   Procedure Laterality Date    CERVICAL LAMINECTOMY N/A 6/24/2019    POSTERIOR CERVICAL FUSION C3-4  (GLOBUS, SSEP [facility]OKES CONF# [id number] - ANEL. ) performed [facility]  2007, 2014    x2    CYST REMOVAL Left 2004    breast    NECK SURGERY  06/24/2019    POSTERIOR CERVICAL FUSION C3-4      TUBAL LIGATION  2014     Social History: Zachary Alegria  reports that she has never smoked. She has never used smokeless tobacco. She reports current alcohol use. She reports that she does not use drugs.     Family History   Problem Relation Age of Onset    High Blood Pressure Father     Migraines Sister     Migraines Paternal Grandmother     Heart Disease Paternal Grandmother     Cancer Paternal Grandfather         brain     On exam: Blood pressure 128/86, pulse 83, temperature 98.2 °F (36.8 °C), height 5' 4\" (1.626 m), weight 147 lb 3.2 oz (66.8 kg), not currently breastfeeding. GENERAL  Appears comfortable and in no distress   HEENT  presently wearing cervical collar   NECK  Supple and no bruits heard   MENTAL STATUS:  Alert, oriented, intact memory, no confusion, normal speech, normal language, no hallucination or delusion; Appropriate affect. CRANIAL NERVES: II     -      PERRLA; VA 20/20 OU., Visual fields intact to confrontation  III,IV,VI -  EOMs full, no afferent defect, no  KEHINDE, no ptosis  V     -     Normal facial sensation  VII    -     Normal facial symmetry  VIII   -     Intact hearing  IX,X -     Symmetrical palate  XI    -     Symmetrical shoulder shrug  XII   -     Midline tongue, no atrophy    MOTOR FUNCTION:  significant for good strength of grade 5/5 in bilateral proximal and distal muscle groups of both upper and lower extremities with normal bulk, normal tone and no involuntary movements, no tremor; Spurling's sign equivocal. Parks's sign negative. SENSORY FUNCTION:  Normal touch, normal pin, normal vibration, normal proprioception   CEREBELLAR FUNCTION:  Intact fine motor control over upper limbs   REFLEX FUNCTION:  Symmetric and brisk DTRs with no parks sign; no Babinski sign   STATION and GAIT  Normal station, antalgic gait        Diagnostic data reviewed with the patient:   MRI Brain (w/wo) (1/31/17): No intracranial abnormality. Normal MRI of the brain.     CBC:     Lab Results   Component Value Date    WBC 6.7 08/11/2020    HGB 14.2 08/11/2020     08/11/2020      BMP:     Lab Results   Component Value Date     08/11/2020    K 4.5 08/11/2020    GLUCOSE 98 08/11/2020    CALCIUM 10.0 08/11/2020         Lab Results   Component Value Date    CHOL 199 08/11/2020    LDLCHOLESTEROL 100 08/11/2020    HDL 66 08/11/2020    TRIG 167 (H) 08/11/2020    ALT 11 08/11/2020    AST 17 08/11/2020    TSH 3.19 08/11/2020    LABA1C 4.8 08/11/2020     MRI cervical spine 1/18/19: Multiple levels with

## 2020-10-23 NOTE — LETTER
POSTERIOR CERVICAL FUSION C3-4  (GLOBUS, SSEP EVOKES CONF# [id number] - ANEL. ) performed by Ken Duvall MD at [address] [address]  2007, 2014    x2    CYST REMOVAL Left 2004    breast    NECK SURGERY  06/24/2019    POSTERIOR CERVICAL FUSION C3-4      TUBAL LIGATION  2014     Social History: Esperanza Villaseñor  reports that she has never smoked. She has never used smokeless tobacco. She reports current alcohol use. She reports that she does not use drugs. Family History   Problem Relation Age of Onset    High Blood Pressure Father     Migraines Sister     Migraines Paternal Grandmother     Heart Disease Paternal Grandmother     Cancer Paternal Grandfather         brain     On exam: Blood pressure 128/86, pulse 83, temperature 98.2 °F (36.8 °C), height 5' 4\" (1.626 m), weight 147 lb 3.2 oz (66.8 kg), not currently breastfeeding. GENERAL  Appears comfortable and in no distress   HEENT  presently wearing cervical collar   NECK  Supple and no bruits heard   MENTAL STATUS:  Alert, oriented, intact memory, no confusion, normal speech, normal language, no hallucination or delusion; Appropriate affect. CRANIAL NERVES: II     -      PERRLA; VA 20/20 OU., Visual fields intact to confrontation  III,IV,VI -  EOMs full, no afferent defect, no  KEHINDE, no ptosis  V     -     Normal facial sensation  VII    -     Normal facial symmetry  VIII   -     Intact hearing  IX,X -     Symmetrical palate  XI    -     Symmetrical shoulder shrug  XII   -     Midline tongue, no atrophy    MOTOR FUNCTION:  significant for good strength of grade 5/5 in bilateral proximal and distal muscle groups of both upper and lower extremities with normal bulk, normal tone and no involuntary movements, no tremor; Spurling's sign equivocal. Parks's sign negative.    SENSORY FUNCTION:  Normal touch, normal pin, normal vibration, normal proprioception   CEREBELLAR FUNCTION:  Intact fine motor control over upper limbs REFLEX FUNCTION:  Symmetric and brisk DTRs with no sheldon sign; no Babinski sign   STATION and GAIT  Normal station, normal gait       Diagnostic data reviewed with the patient:   MRI Brain (w/wo) (1/31/17): No intracranial abnormality. Normal MRI of the brain. CBC:     Lab Results   Component Value Date    WBC 6.7 08/11/2020    HGB 14.2 08/11/2020     08/11/2020      BMP:     Lab Results   Component Value Date     08/11/2020    K 4.5 08/11/2020    GLUCOSE 98 08/11/2020    CALCIUM 10.0 08/11/2020         Lab Results   Component Value Date    CHOL 199 08/11/2020    LDLCHOLESTEROL 100 08/11/2020    HDL 66 08/11/2020    TRIG 167 (H) 08/11/2020    ALT 11 08/11/2020    AST 17 08/11/2020    TSH 3.19 08/11/2020    LABA1C 4.8 08/11/2020     MRI cervical spine 1/18/19: Multiple levels with small disc protrusions in cervical spine without significant canal stenosis or neural foraminal narrowing. No demyelinating plaques in cervical cord. No abnormal enhancement with IV contrast.      EMG Rt UE (1/24/19): There is electrophysiologic evidence of mild right C6/C7 cervical radiculopathy. This study also demonstrated subtle median neuropathy at right wrist suggestive of right carpal tunnel syndrome of mild severity. Impression and Plan: Ms. Wilfrido Brady is a 30 y.o. female with   Two month hx of progressive weakness and pain and clumsiness with gait difficulties with worsening headaches and vision changes; question demyelinating disease of CNS vs cervical spondylotic myelopathy; will get MRI brain (with/without), MRI cervical spine (with/without) and also to start her on prednisone short-term steroid therapy along with gabapentin and tizanidine. Prednisone medication counseling done including risks and benefits. Also to send lorazepam for claustrophobia. Follow-up in clinic once the above testing is done.           Headaches, neck pain, radicular pain in right upper extremity extending to She has 2 children, Guero (2011) and Malou Jc (2006)    Medications trials: Failed Topamax, beta blockers and tricyclics (amitriptyline). Review of systems done by staff reviewed and pertinent positives include neck pain, headaches, phobia, phonophobia, fatigue, right arm pain, dizziness with lightheadedness, clumsiness, gait difficulty and anxiety. Denied anxiety/depression. Denied vertigo, diplopia. Current Outpatient Medications on File Prior to Visit   Medication Sig Dispense Refill    diclofenac (VOLTAREN) 75 MG EC tablet       cloNIDine (CATAPRES) 0.1 MG tablet TAKE ONE TABLET BY MOUTH TWICE A  tablet 0    Multiple Vitamins-Minerals (MULTIVITAMIN ADULT PO) Take by mouth daily      VITAMIN D PO Take by mouth daily      Calcium Carbonate Antacid (CALCIUM CARBONATE PO) Take by mouth daily      tiZANidine (ZANAFLEX) 4 MG tablet Take one tab nightly and half tab in am as needed for spasms 135 tablet 1    gabapentin (NEURONTIN) 300 MG capsule TAKE ONE CAP three times daily at 8 am, 2 pm and 8 pm 270 capsule 1    butalbital-acetaminophen-caffeine (FIORICET, ESGIC) -40 MG per tablet TAKE ONE TABLET BY MOUTH DAILY AS NEEDED FOR SEVER HEADACHE 40 tablet 0    Galcanezumab-gnlm (EMGALITY) 120 MG/ML SOAJ INJECT UNDER THE SKIN ONCE A MONTH 3 pen 2    rizatriptan (MAXALT) 10 MG tablet Take 1 tablet by mouth once as needed for Migraine May repeat in 2 hours if needed 9 tablet 3    docusate sodium (COLACE) 100 MG capsule Take 1 capsule by mouth 2 times daily as needed for Constipation (Patient not taking: Reported on 10/23/2020) 60 capsule 0     No current facility-administered medications on file prior to visit. Allergies: Toan Wilde has No Known Allergies.     Past Medical History:   Diagnosis Date    Headache 2003    MIGRAINES    Hypertension 05/2019    ON RX    Psoriasis     BEHIND EARS       Past Surgical History:   Procedure Laterality Date  CERVICAL LAMINECTOMY N/A 6/24/2019    POSTERIOR CERVICAL FUSION C3-4  (GLOBUS, SSEP EVOKES CONF# [id number] - ANEL. ) performed by Cassie Trevino MD at [address]  2007, 2014    x2    CYST REMOVAL Left 2004    breast    NECK SURGERY  06/24/2019    POSTERIOR CERVICAL FUSION C3-4      TUBAL LIGATION  2014     Social History: Giulia Horne  reports that she has never smoked. She has never used smokeless tobacco. She reports current alcohol use. She reports that she does not use drugs. Family History   Problem Relation Age of Onset    High Blood Pressure Father     Migraines Sister     Migraines Paternal Grandmother     Heart Disease Paternal Grandmother     Cancer Paternal Grandfather         brain     On exam: Blood pressure 128/86, pulse 83, temperature 98.2 °F (36.8 °C), height 5' 4\" (1.626 m), weight 147 lb 3.2 oz (66.8 kg), not currently breastfeeding. GENERAL  Appears comfortable and in no distress   HEENT  presently wearing cervical collar   NECK  Supple and no bruits heard   MENTAL STATUS:  Alert, oriented, intact memory, no confusion, normal speech, normal language, no hallucination or delusion; Appropriate affect. CRANIAL NERVES: II     -      PERRLA; VA 20/20 OU., Visual fields intact to confrontation  III,IV,VI -  EOMs full, no afferent defect, no  KEHINDE, no ptosis  V     -     Normal facial sensation  VII    -     Normal facial symmetry  VIII   -     Intact hearing  IX,X -     Symmetrical palate  XI    -     Symmetrical shoulder shrug  XII   -     Midline tongue, no atrophy    MOTOR FUNCTION:  significant for good strength of grade 5/5 in bilateral proximal and distal muscle groups of both upper and lower extremities with normal bulk, normal tone and no involuntary movements, no tremor; Spurling's sign equivocal. Parks's sign negative.    SENSORY FUNCTION:  Normal touch, normal pin, normal vibration, normal proprioception CEREBELLAR FUNCTION:  Intact fine motor control over upper limbs   REFLEX FUNCTION:  Symmetric and brisk DTRs with no sheldon sign; no Babinski sign   STATION and GAIT  Normal station, antalgic gait        Diagnostic data reviewed with the patient:   MRI Brain (w/wo) (1/31/17): No intracranial abnormality. Normal MRI of the brain. CBC:     Lab Results   Component Value Date    WBC 6.7 08/11/2020    HGB 14.2 08/11/2020     08/11/2020      BMP:     Lab Results   Component Value Date     08/11/2020    K 4.5 08/11/2020    GLUCOSE 98 08/11/2020    CALCIUM 10.0 08/11/2020         Lab Results   Component Value Date    CHOL 199 08/11/2020    LDLCHOLESTEROL 100 08/11/2020    HDL 66 08/11/2020    TRIG 167 (H) 08/11/2020    ALT 11 08/11/2020    AST 17 08/11/2020    TSH 3.19 08/11/2020    LABA1C 4.8 08/11/2020     MRI cervical spine 1/18/19: Multiple levels with small disc protrusions in cervical spine without significant canal stenosis or neural foraminal narrowing. No demyelinating plaques in cervical cord. No abnormal enhancement with IV contrast.      EMG Rt UE (1/24/19): There is electrophysiologic evidence of mild right C6/C7 cervical radiculopathy. This study also demonstrated subtle median neuropathy at right wrist suggestive of right carpal tunnel syndrome of mild severity. Impression and Plan: Ms. Giulia Horne is a 30 y.o. female with   Two month hx of progressive weakness and pain and clumsiness with gait difficulties with worsening headaches and vision changes; question demyelinating disease of CNS vs cervical spondylotic myelopathy; will get MRI brain (with/without), MRI cervical spine (with/without) and also to start her on prednisone short-term steroid therapy along with gabapentin 300 tid and tizanidine. Prednisone medication counseling done including risks and benefits. Also to send lorazepam for claustrophobia.     Hx of cervical spinal fusion in June 2019 Intractable tension type headaches with superimposed intermittent migrainous attacks: continue  TID. she will continue Maxalt prn for migraine rescue therapy and Emgality 120 mg once monthly for migraine prophylaxis. She also added that she did not get much relief with Emgality. Follow up in 4-6 weeks. Please note that portions of this note were completed with a voice recognition program.  Although every effort was made to ensure the accuracy of this automated transcription, some errors in transcription may have occurred; occasionally words are mis-transcribed. Thank you for understanding. If you have questions, please do not hesitate to call me. I look forward to following Iris Alvarado along with you.     Sincerely,        Ming Bui MD

## 2020-11-05 ENCOUNTER — HOSPITAL ENCOUNTER (OUTPATIENT)
Dept: MRI IMAGING | Facility: CLINIC | Age: 35
Discharge: HOME | End: 2020-11-07
Payer: COMMERCIAL

## 2020-11-05 LAB — POC CREATININE: 0.9 MG/DL (ref 0.6–1.4)

## 2020-11-05 PROCEDURE — 72156 MRI NECK SPINE W/O & W/DYE: CPT

## 2020-11-05 PROCEDURE — 70553 MRI BRAIN STEM W/O & W/DYE: CPT

## 2020-11-05 PROCEDURE — 82565 ASSAY OF CREATININE: CPT

## 2020-11-05 PROCEDURE — A9579 GAD-BASE MR CONTRAST NOS,1ML: HCPCS | Performed by: PSYCHIATRY & NEUROLOGY

## 2020-11-05 PROCEDURE — 6360000004 HC RX CONTRAST MEDICATION: Performed by: PSYCHIATRY & NEUROLOGY

## 2020-11-05 RX ADMIN — GADOTERIDOL 15 ML: 279.3 INJECTION, SOLUTION INTRAVENOUS at 14:54

## 2020-11-10 ENCOUNTER — OFFICE VISIT (OUTPATIENT)
Dept: INTERNAL MEDICINE CLINIC | Age: 35
End: 2020-11-10
Payer: COMMERCIAL

## 2020-11-10 VITALS
RESPIRATION RATE: 18 BRPM | HEIGHT: 64 IN | SYSTOLIC BLOOD PRESSURE: 116 MMHG | WEIGHT: 149 LBS | HEART RATE: 92 BPM | BODY MASS INDEX: 25.44 KG/M2 | DIASTOLIC BLOOD PRESSURE: 78 MMHG | TEMPERATURE: 98.1 F

## 2020-11-10 PROBLEM — F41.9 ANXIETY AND DEPRESSION: Status: ACTIVE | Noted: 2020-11-10

## 2020-11-10 PROBLEM — F32.A ANXIETY AND DEPRESSION: Status: ACTIVE | Noted: 2020-11-10

## 2020-11-10 PROCEDURE — 99214 OFFICE O/P EST MOD 30 MIN: CPT | Performed by: FAMILY MEDICINE

## 2020-11-10 RX ORDER — DULOXETIN HYDROCHLORIDE 60 MG/1
60 CAPSULE, DELAYED RELEASE ORAL DAILY
Qty: 30 CAPSULE | Refills: 3 | Status: SHIPPED | OUTPATIENT
Start: 2020-11-10 | End: 2021-02-08 | Stop reason: SDUPTHER

## 2020-11-10 ASSESSMENT — ENCOUNTER SYMPTOMS
VISUAL CHANGE: 1
RESPIRATORY NEGATIVE: 1
ALLERGIC/IMMUNOLOGIC NEGATIVE: 1
EYES NEGATIVE: 1
GASTROINTESTINAL NEGATIVE: 1

## 2020-11-10 NOTE — PROGRESS NOTES
Subjective:      Patient ID: Esperanza Villaseñor is a 30 y.o. female. Migraine    This is a chronic problem. The current episode started more than 1 month ago. The problem occurs daily. The problem has been waxing and waning. The pain is located in the bilateral region. The pain does not radiate. The pain quality is similar to prior headaches. The quality of the pain is described as aching and sharp. The pain is at a severity of 6/10. The pain is severe. Associated symptoms include muscle aches and a visual change. The symptoms are aggravated by emotional stress. Treatments tried: Voltaren, gabapentin, Zanaflex, Fioricet, magnesium oxide. The treatment provided moderate relief. Her past medical history is significant for migraine headaches. Review of Systems   Constitutional: Negative. HENT: Negative. Eyes: Negative. Respiratory: Negative. Cardiovascular: Negative. Gastrointestinal: Negative. Endocrine: Negative. Musculoskeletal: Negative. Skin: Negative. Allergic/Immunologic: Negative. Neurological: Negative. Hematological: Negative. Psychiatric/Behavioral: Positive for dysphoric mood. The patient is nervous/anxious. Past family and social history unremarkable. Diagnosis Orders   1. Intractable chronic migraine without aura and without status migrainosus     2. Muscle spasm     3. Chronic tension-type headache, intractable     4. Radicular pain in right arm     5. Essential hypertension     6. S/P cervical discectomy     7. NISH (generalized anxiety disorder)     8. Anxiety and depression           Objective:   Physical Exam  Vitals signs and nursing note reviewed. Constitutional:       Appearance: She is well-developed. HENT:      Head: Normocephalic and atraumatic. Right Ear: External ear normal.      Left Ear: External ear normal.   Eyes:      Conjunctiva/sclera: Conjunctivae normal.      Pupils: Pupils are equal, round, and reactive to light.    Neck: Musculoskeletal: Normal range of motion and neck supple. Cardiovascular:      Rate and Rhythm: Normal rate and regular rhythm. Heart sounds: Normal heart sounds. Comments: Hypertension controlled on clonidine  Pulmonary:      Effort: Pulmonary effort is normal.      Breath sounds: Normal breath sounds. Abdominal:      General: Bowel sounds are normal.      Palpations: Abdomen is soft. Genitourinary:     Vagina: Normal.   Musculoskeletal: Normal range of motion. 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 No apparent sign of myelopathy. Skin:     General: Skin is warm and dry. Neurological:      Mental Status: She is alert and oriented to person, place, and time. Deep Tendon Reflexes: Reflexes are normal and symmetric. Psychiatric:      Comments: Anxiety/depression         Assessment:       Diagnosis Orders   1. Intractable chronic migraine without aura and without status migrainosus     2. Muscle spasm     3. Chronic tension-type headache, intractable     4. Radicular pain in right arm     5. Essential hypertension     6. S/P cervical discectomy     7. NISH (generalized anxiety disorder)     8. Anxiety and depression             Plan:      42-year-old very anxious  female is presented for follow-up. She is afebrile hemodynamically stable, clinical examination is benign  History of migraine with sensitivity to light. She is established with neurology on polypharmacy including Maxalt, magnesium oxide, Emgality, Fioricet, Zanaflex, gabapentin, Voltaren that she is tolerating well  Anxiety/depression with difficulty staying asleep. Reassurance provided. She is placed on Cymbalta   History of C3/C4 decompression with hardware. She is established with spine surgery  She denies tobacco, excessive alcohol or illicit drug use  Hypertension well-controlled to clonidine.   Stress reduction is

## 2020-12-22 ENCOUNTER — OFFICE VISIT (OUTPATIENT)
Dept: NEUROLOGY | Age: 35
End: 2020-12-22
Payer: COMMERCIAL

## 2020-12-22 VITALS
WEIGHT: 145 LBS | DIASTOLIC BLOOD PRESSURE: 97 MMHG | SYSTOLIC BLOOD PRESSURE: 130 MMHG | BODY MASS INDEX: 24.75 KG/M2 | HEIGHT: 64 IN | HEART RATE: 74 BPM | TEMPERATURE: 97.3 F

## 2020-12-22 PROCEDURE — 99214 OFFICE O/P EST MOD 30 MIN: CPT | Performed by: PSYCHIATRY & NEUROLOGY

## 2020-12-22 RX ORDER — GALCANEZUMAB 120 MG/ML
INJECTION, SOLUTION SUBCUTANEOUS
Qty: 3 PEN | Refills: 2 | Status: SHIPPED | OUTPATIENT
Start: 2020-12-22 | End: 2021-10-19

## 2020-12-22 RX ORDER — ACETAMINOPHEN AND CODEINE PHOSPHATE 300; 30 MG/1; MG/1
1 TABLET ORAL EVERY 4 HOURS PRN
Qty: 18 TABLET | Refills: 0 | Status: SHIPPED | OUTPATIENT
Start: 2020-12-22 | End: 2021-03-23 | Stop reason: SDUPTHER

## 2020-12-22 RX ORDER — TIZANIDINE 4 MG/1
TABLET ORAL
Qty: 135 TABLET | Refills: 1 | Status: SHIPPED | OUTPATIENT
Start: 2020-12-22 | End: 2021-10-19 | Stop reason: SDUPTHER

## 2020-12-22 RX ORDER — MELOXICAM 7.5 MG/1
7.5 TABLET ORAL DAILY
Qty: 30 TABLET | Refills: 0 | Status: SHIPPED | OUTPATIENT
Start: 2020-12-22 | End: 2021-01-29

## 2020-12-22 RX ORDER — BUTALBITAL, ACETAMINOPHEN AND CAFFEINE 50; 325; 40 MG/1; MG/1; MG/1
TABLET ORAL
Qty: 40 TABLET | Refills: 0 | Status: SHIPPED | OUTPATIENT
Start: 2020-12-22

## 2020-12-22 RX ORDER — GABAPENTIN 300 MG/1
CAPSULE ORAL
Qty: 270 CAPSULE | Refills: 1 | Status: SHIPPED
Start: 2020-12-22 | End: 2021-03-26 | Stop reason: DRUGHIGH

## 2020-12-22 RX ORDER — RIZATRIPTAN BENZOATE 10 MG/1
10 TABLET ORAL
Qty: 9 TABLET | Refills: 3 | Status: SHIPPED | OUTPATIENT
Start: 2020-12-22 | End: 2022-07-25 | Stop reason: ALTCHOICE

## 2020-12-22 NOTE — LETTER
[facility]  [address] [facility] [address]  Phone: [phone]  Fax: [phone]    Ej Valencia MD        2020     [address] [id number]    Patient: Jo Cornelius  MR Number: [id number]  YOB: 1985  Date of Visit: 2020    Dear Dr. Moraima Stone: Thank you for the request for consultation for Zoraida Caputo to me for the evaluation of Kath Razo  Below are the relevant portions of my assessment and plan of care. NEUROLOGY FOLLOW-UP  Patient Name:  Jo Cornelius  :   1985  Clinic Visit Date: 2020  Last visit date: 10/23/2020      Dear Dr. Moraima Stone MD     I saw Ms. Jo Cornelius in follow-up in the office today in continuation of neurologic care. She  is a 30 y.o. right handed  female with intermittent migrainous attacks superimposed on chronic tension type headaches; on  tid + Tizanidine & Maxalt prn. She had cervical myelopathy s/p cervical spinal fusion on 2019 with significant improvement in tension type headaches. She comes to clinic stating that she has been having intractable headaches along with neck pain. She has been having electric shocklike sensation in the neck radiating down the spine extending to right hand digits, predominantly last 3 digits of right hand. She denied bladder dysfunction. Admits to having difficulty with fine motor control. Denied balance difficulties and falls. During prior visits; for intractable migraines she was given a prescription for Medrol Dosepak but she was unable to tolerate steroids. Also has been having ongoing tension type headaches with neck pain on right side radiating to top of the head. This has been occurring with increasing frequency and severity for the past few weeks. She also has muscle spasms and she has been taking tizanidine. Whenever she misses the evening dose of tizanidine she has much more increased spasms and neck pain. She has had posterior cervical fusion surgery for cervical radiculopathy on 6/24/2019. She had significant transient improvement in neck pain and headaches. Of note; she had hx of chronic daily headaches with \"headaches for at least 15 years since teenage years\". With superimposed intermittent migrainous attacks; has been on gabapentin 400 bid, tizanidine nightly for headache prophylaxis. She is also on Maxalt alternating with butalbital for rescue therapy. She is also on Aimovig 70mg once monthly injn preventive therapy. She had suffered from neck pain with right upper extremity weakness prior to surgery in June 2019. She has had limitation of neck movement. She also admits to having intermittent pain radiating down right upper extremity. Denies bladder dysfunction but admits to having clumsiness with occasional gait disturbances with ongoing neck pain and right shoulder pain. She has tried NSAIDs with no relief. Her migraines occur with aura consisted of severe left parietotemporal throbbing pains associated with photophobia and phonophobia and nausea lasting several hours of the day. Her frequent headaches were described as a pressure like headaches with mild to moderate severity associated with the photophobia only and not associated with nausea and vomiting and able to function with many of these frequent tension type headaches. She also has had neck pain with the spasms and tizanidine has helped. Of note; she has family history of migraine headaches in her grandmother and paternal aunt and sister. She has 2 children, Guero (2011) and Nathaly Flowers (2006)    Medications trials: Failed Topamax, beta blockers and tricyclics (amitriptyline). Review of systems done and pertinent positives include neck pain, headaches, phonophobia, fatigue, right arm pain, dizziness with lightheadedness, gait difficulty and anxiety. Denied anxiety/depression. Denied vertigo, diplopia. Current Outpatient Medications on File Prior to Visit   Medication Sig Dispense Refill    DULoxetine (CYMBALTA) 60 MG extended release capsule Take 1 capsule by mouth daily 30 capsule 3    diclofenac (VOLTAREN) 75 MG EC tablet       gabapentin (NEURONTIN) 300 MG capsule TAKE ONE CAP three times daily at 8 am, 2 pm and 8 pm 270 capsule 1    tiZANidine (ZANAFLEX) 4 MG tablet Take one tab nightly and half tab in am as needed for spasms 135 tablet 1    rizatriptan (MAXALT) 10 MG tablet Take 1 tablet by mouth once as needed for Migraine May repeat in 2 hours if needed 9 tablet 3    butalbital-acetaminophen-caffeine (FIORICET, ESGIC) -40 MG per tablet TAKE ONE TABLET BY MOUTH DAILY AS NEEDED FOR SEVER HEADACHE 40 tablet 0    cloNIDine (CATAPRES) 0.1 MG tablet TAKE ONE TABLET BY MOUTH TWICE A  tablet 0    Multiple Vitamins-Minerals (MULTIVITAMIN ADULT PO) Take by mouth daily      VITAMIN D PO Take by mouth daily      Calcium Carbonate Antacid (CALCIUM CARBONATE PO) Take by mouth daily      Galcanezumab-gnlm (EMGALITY) 120 MG/ML SOAJ INJECT UNDER THE SKIN ONCE A MONTH 3 pen 2     No current facility-administered medications on file prior to visit. Allergies: Giulia Horne has No Known Allergies.     Past Medical History:   Diagnosis Date    Headache 2003    MIGRAINES    Hypertension 05/2019    ON RX    Psoriasis     BEHIND EARS       Past Surgical History:   Procedure Laterality Date    CERVICAL LAMINECTOMY N/A 6/24/2019 CEREBELLAR FUNCTION:  Intact fine motor control over upper limbs   REFLEX FUNCTION:  Symmetric and brisk DTRs with no sheldon sign; no Babinski sign   STATION and GAIT  Normal station, antalgic gait        Diagnostic data reviewed with the patient:   MRI Brain (w/wo) (1/31/17): No intracranial abnormality. Normal MRI of the brain. CBC:     Lab Results   Component Value Date    WBC 6.7 08/11/2020    HGB 14.2 08/11/2020     08/11/2020      BMP:     Lab Results   Component Value Date     08/11/2020    K 4.5 08/11/2020    GLUCOSE 98 08/11/2020    CALCIUM 10.0 08/11/2020         Lab Results   Component Value Date    CHOL 199 08/11/2020    LDLCHOLESTEROL 100 08/11/2020    HDL 66 08/11/2020    TRIG 167 (H) 08/11/2020    ALT 11 08/11/2020    AST 17 08/11/2020    TSH 3.19 08/11/2020    LABA1C 4.8 08/11/2020     MRI cervical spine 1/18/19: Multiple levels with small disc protrusions in cervical spine without significant canal stenosis or neural foraminal narrowing. No demyelinating plaques; No abnl enhancement with IV contrast.    EMG Rt UE (1/24/19): There is electrophysiologic evidence of mild right C6/C7 cervical radiculopathy. This study also demonstrated subtle median neuropathy at right wrist suggestive of right carpal tunnel syndrome of mild severity. MRI Cervical Spine (w/wo) 11/5/2020: Status post posterior instrumented fusion of C3-4. Degenerative disc disease without spinal canal stenosis or foraminal stenosis. No abnormal signal in cervical spinal cord. No abnormal enhancement of the spinal cord. Impression and Plan: Ms. Leandra Lloyd is a 30 y.o. female with   Radicular pain in right upper extremity; s/p C3-4 cervical spine fusion(06/2019); reviewed recent MRI cervical spine on 11/5/2020; for her ongoing radicular pain; will start her on meloxicam and tizanidine. Will also refer her to pain clinic. Intractable headaches with the cervical spine dysfunction superimposed on migrainous attacks:Continue gabapentin 300 mg tid, tizanidine prn, rizatriptan prn migrainous attacks. Continue Emgality 120 mg once daily. Intermittent intractable neck pain and headaches refractory to NSAIDs; script was provided for Tylenol with codeine and she was strongly advised about addiction potential.  She well understood about physical/psychological dependency potential.  She will use it sparingly only while waiting for pain clinic appointment. Discussion done about polypharmacy. She will use pain medications for short-term basis only until she gets appointment with the pain clinic. Follow-up in 6-8 weeks. Please note that portions of this note were completed with a voice recognition program.  Although every effort was made to ensure the accuracy of this automated transcription, some errors in transcription may have occurred; occasionally words are mis-transcribed. Thank you for understanding. If you have questions, please do not hesitate to call me. I look forward to following Tasia Bowman along with you.     Sincerely,        Bárbara Johnson MD

## 2020-12-22 NOTE — PROGRESS NOTES
NEUROLOGY FOLLOW-UP  Patient Name:  Leandra Lloyd  :   1985  Clinic Visit Date: 2020  Last visit date: 10/23/2020      Dear Dr. Pinky Rivers MD     I saw Ms. Leandra Lloyd in follow-up in the office today in continuation of neurologic care. She  is a 30 y.o. right handed  female with intermittent migrainous attacks superimposed on chronic tension type headaches; on  tid + Tizanidine & Maxalt prn. She had cervical myelopathy s/p cervical spinal fusion on 2019 with significant improvement in tension type headaches. She comes to clinic stating that she has been having intractable headaches along with neck pain. She has been having electric shocklike sensation in the neck radiating down the spine extending to right hand digits, predominantly last 3 digits of right hand. She denied bladder dysfunction. Admits to having difficulty with fine motor control. Denied balance difficulties and falls. During prior visits; for intractable migraines she was given a prescription for Medrol Dosepak but she was unable to tolerate steroids. Also has been having ongoing tension type headaches with neck pain on right side radiating to top of the head. This has been occurring with increasing frequency and severity for the past few weeks. She also has muscle spasms and she has been taking tizanidine. Whenever she misses the evening dose of tizanidine she has much more increased spasms and neck pain. She has had posterior cervical fusion surgery for cervical radiculopathy on 2019. She had significant transient improvement in neck pain and headaches. Of note; she had hx of chronic daily headaches with \"headaches for at least 15 years since teenage years\". With superimposed intermittent migrainous attacks; has been on gabapentin 400 bid, tizanidine nightly for headache prophylaxis. She is also on Maxalt alternating with butalbital for rescue therapy.   She is also on Aimovig 70mg once monthly injn preventive therapy. She had suffered from neck pain with right upper extremity weakness prior to surgery in June 2019. She has had limitation of neck movement. She also admits to having intermittent pain radiating down right upper extremity. Denies bladder dysfunction but admits to having clumsiness with occasional gait disturbances with ongoing neck pain and right shoulder pain. She has tried NSAIDs with no relief. Her migraines occur with aura consisted of severe left parietotemporal throbbing pains associated with photophobia and phonophobia and nausea lasting several hours of the day. Her frequent headaches were described as a pressure like headaches with mild to moderate severity associated with the photophobia only and not associated with nausea and vomiting and able to function with many of these frequent tension type headaches. She also has had neck pain with the spasms and tizanidine has helped. Of note; she has family history of migraine headaches in her grandmother and paternal aunt and sister. She has 2 children, Guero (2011) and Jessica Escamilla (2006)    Medications trials: Failed Topamax, beta blockers and tricyclics (amitriptyline). Review of systems done and pertinent positives include neck pain, headaches, phonophobia, fatigue, right arm pain, dizziness with lightheadedness, gait difficulty and anxiety. Denied anxiety/depression. Denied vertigo, diplopia.          Current Outpatient Medications on File Prior to Visit   Medication Sig Dispense Refill    DULoxetine (CYMBALTA) 60 MG extended release capsule Take 1 capsule by mouth daily 30 capsule 3    diclofenac (VOLTAREN) 75 MG EC tablet       gabapentin (NEURONTIN) 300 MG capsule TAKE ONE CAP three times daily at 8 am, 2 pm and 8 pm 270 capsule 1    tiZANidine (ZANAFLEX) 4 MG tablet Take one tab nightly and half tab in am as needed for spasms 135 tablet 1    rizatriptan (MAXALT) 10 MG tablet Take 1 tablet by mouth once as needed for Migraine May repeat in 2 hours if needed 9 tablet 3    butalbital-acetaminophen-caffeine (FIORICET, ESGIC) -40 MG per tablet TAKE ONE TABLET BY MOUTH DAILY AS NEEDED FOR SEVER HEADACHE 40 tablet 0    cloNIDine (CATAPRES) 0.1 MG tablet TAKE ONE TABLET BY MOUTH TWICE A  tablet 0    Multiple Vitamins-Minerals (MULTIVITAMIN ADULT PO) Take by mouth daily      VITAMIN D PO Take by mouth daily      Calcium Carbonate Antacid (CALCIUM CARBONATE PO) Take by mouth daily      Galcanezumab-gnlm (EMGALITY) 120 MG/ML SOAJ INJECT UNDER THE SKIN ONCE A MONTH 3 pen 2     No current facility-administered medications on file prior to visit. Allergies: Melyssa Last has No Known Allergies. Past Medical History:   Diagnosis Date    Headache 2003    MIGRAINES    Hypertension 05/2019    ON RX    Psoriasis     BEHIND EARS       Past Surgical History:   Procedure Laterality Date    CERVICAL LAMINECTOMY N/A 6/24/2019    POSTERIOR CERVICAL FUSION C3-4  (GLOBUS, SSEP [facility]OKES CONF# [id number] - ANEL. ) performed by Keith Sullivan MD at [facility]  2007, 2014    x2    CYST REMOVAL Left 2004    breast    NECK SURGERY  06/24/2019    POSTERIOR CERVICAL FUSION C3-4      TUBAL LIGATION  2014     Social History: Melyssa Last  reports that she has never smoked. She has never used smokeless tobacco. She reports current alcohol use. She reports that she does not use drugs. Family History   Problem Relation Age of Onset    High Blood Pressure Father     Migraines Sister     Migraines Paternal Grandmother     Heart Disease Paternal Grandmother     Cancer Paternal Grandfather         brain     On exam: vitals: Blood pressure (!) 130/97, pulse 74, temperature 97.3 °F (36.3 °C), temperature source Temporal, height 5' 4\" (1.626 m), weight 145 lb (65.8 kg), not currently breastfeeding.     GENERAL  Appears comfortable and in no distress   HEENT  presently wearing cervical collar   NECK  Supple and no bruits heard   MENTAL STATUS:  Alert, oriented, intact memory, no confusion, normal speech, normal language, no hallucination or delusion; Appropriate affect. CRANIAL NERVES: II     -      PERRLA; VA 20/20 OU., Visual fields intact to confrontation  III,IV,VI -  EOMs full, no afferent defect, no  KEHINDE, no ptosis  V     -     Normal facial sensation  VII    -     Normal facial symmetry  VIII   -     Intact hearing  IX,X -     Symmetrical palate  XI    -     Symmetrical shoulder shrug  XII   -     Midline tongue, no atrophy    MOTOR FUNCTION:  significant for good strength of grade 5/5 in bilateral proximal and distal muscle groups of both upper and lower extremities with normal bulk, normal tone and no involuntary movements, no tremor; Spurling's sign equivocal. Parks's sign negative. SENSORY FUNCTION:  Normal touch, normal pin, normal vibration, normal proprioception   CEREBELLAR FUNCTION:  Intact fine motor control over upper limbs   REFLEX FUNCTION:  Symmetric and brisk DTRs with no parks sign; no Babinski sign   STATION and GAIT  Normal station, antalgic gait        Diagnostic data reviewed with the patient:   MRI Brain (w/wo) (1/31/17): No intracranial abnormality. Normal MRI of the brain. CBC:     Lab Results   Component Value Date    WBC 6.7 08/11/2020    HGB 14.2 08/11/2020     08/11/2020      BMP:     Lab Results   Component Value Date     08/11/2020    K 4.5 08/11/2020    GLUCOSE 98 08/11/2020    CALCIUM 10.0 08/11/2020         Lab Results   Component Value Date    CHOL 199 08/11/2020    LDLCHOLESTEROL 100 08/11/2020    HDL 66 08/11/2020    TRIG 167 (H) 08/11/2020    ALT 11 08/11/2020    AST 17 08/11/2020    TSH 3.19 08/11/2020    LABA1C 4.8 08/11/2020     MRI cervical spine 1/18/19: Multiple levels with small disc protrusions in cervical spine without significant canal stenosis or neural foraminal narrowing. No demyelinating plaques;  No abnl enhancement with IV contrast.    EMG Rt UE (1/24/19): There is electrophysiologic evidence of mild right C6/C7 cervical radiculopathy. This study also demonstrated subtle median neuropathy at right wrist suggestive of right carpal tunnel syndrome of mild severity. MRI Cervical Spine (w/wo) 11/5/2020: Status post posterior instrumented fusion of C3-4. Degenerative disc disease without spinal canal stenosis or foraminal stenosis. No abnormal signal in cervical spinal cord. No abnormal enhancement of the spinal cord. Impression and Plan: Ms. Campos Montano is a 30 y.o. female with   Radicular pain in right upper extremity; s/p C3-4 cervical spine fusion(06/2019); reviewed recent MRI cervical spine on 11/5/2020; for her ongoing radicular pain; will start her on meloxicam and tizanidine. Will also refer her to pain clinic. Intractable headaches with the cervical spine dysfunction superimposed on migrainous attacks:Continue gabapentin 300 mg tid, tizanidine prn, rizatriptan prn migrainous attacks. Continue Emgality 120 mg once daily. Intermittent intractable neck pain and headaches refractory to NSAIDs; script was provided for Tylenol with codeine and she was strongly advised about addiction potential.  She well understood about physical/psychological dependency potential.  She will use it sparingly only while waiting for pain clinic appointment. Discussion done about polypharmacy. She will use pain medications for short-term basis only until she gets appointment with the pain clinic. Follow-up in 6-8 weeks. Please note that portions of this note were completed with a voice recognition program.  Although every effort was made to ensure the accuracy of this automated transcription, some errors in transcription may have occurred; occasionally words are mis-transcribed. Thank you for understanding.

## 2020-12-30 RX ORDER — CLONIDINE HYDROCHLORIDE 0.1 MG/1
TABLET ORAL
Qty: 180 TABLET | Refills: 0 | Status: SHIPPED | OUTPATIENT
Start: 2020-12-30 | End: 2021-03-28 | Stop reason: SDUPTHER

## 2020-12-30 NOTE — TELEPHONE ENCOUNTER
Leandra Lloyd is calling to request a refill on the following medication(s):    Medication Request:  Requested Prescriptions     Pending Prescriptions Disp Refills    cloNIDine (CATAPRES) 0.1 MG tablet 180 tablet 0       Last Visit Date (If Applicable):  44/93/4739    Next Visit Date:    1/5/2021

## 2021-01-05 ENCOUNTER — HOSPITAL ENCOUNTER (OUTPATIENT)
Age: 36
Setting detail: SPECIMEN
Discharge: HOME | End: 2021-01-05
Payer: COMMERCIAL

## 2021-01-05 ENCOUNTER — OFFICE VISIT (OUTPATIENT)
Dept: INTERNAL MEDICINE CLINIC | Age: 36
End: 2021-01-05
Payer: COMMERCIAL

## 2021-01-05 VITALS
RESPIRATION RATE: 14 BRPM | WEIGHT: 144 LBS | HEIGHT: 64 IN | SYSTOLIC BLOOD PRESSURE: 120 MMHG | OXYGEN SATURATION: 99 % | TEMPERATURE: 97 F | BODY MASS INDEX: 24.59 KG/M2 | DIASTOLIC BLOOD PRESSURE: 74 MMHG | HEART RATE: 76 BPM

## 2021-01-05 DIAGNOSIS — F41.9 ANXIETY AND DEPRESSION: ICD-10-CM

## 2021-01-05 DIAGNOSIS — F32.A ANXIETY AND DEPRESSION: ICD-10-CM

## 2021-01-05 DIAGNOSIS — Z98.890 S/P CERVICAL DISCECTOMY: ICD-10-CM

## 2021-01-05 DIAGNOSIS — G44.221 CHRONIC TENSION-TYPE HEADACHE, INTRACTABLE: ICD-10-CM

## 2021-01-05 DIAGNOSIS — I10 ESSENTIAL HYPERTENSION: ICD-10-CM

## 2021-01-05 DIAGNOSIS — J31.0 CHRONIC RHINITIS: ICD-10-CM

## 2021-01-05 DIAGNOSIS — Z01.419 ENCOUNTER FOR ROUTINE GYNECOLOGICAL EXAMINATION WITH PAPANICOLAOU SMEAR OF CERVIX: ICD-10-CM

## 2021-01-05 DIAGNOSIS — G43.719 INTRACTABLE CHRONIC MIGRAINE WITHOUT AURA AND WITHOUT STATUS MIGRAINOSUS: ICD-10-CM

## 2021-01-05 DIAGNOSIS — M62.838 MUSCLE SPASM: ICD-10-CM

## 2021-01-05 DIAGNOSIS — N76.1 SUBACUTE VAGINITIS: Primary | ICD-10-CM

## 2021-01-05 PROBLEM — F41.1 GAD (GENERALIZED ANXIETY DISORDER): Status: RESOLVED | Noted: 2020-02-11 | Resolved: 2021-01-05

## 2021-01-05 PROBLEM — M79.2 RADICULAR PAIN IN RIGHT ARM: Status: RESOLVED | Noted: 2019-04-08 | Resolved: 2021-01-05

## 2021-01-05 PROCEDURE — 99214 OFFICE O/P EST MOD 30 MIN: CPT | Performed by: FAMILY MEDICINE

## 2021-01-05 ASSESSMENT — ENCOUNTER SYMPTOMS
EYES NEGATIVE: 1
ALLERGIC/IMMUNOLOGIC NEGATIVE: 1
GASTROINTESTINAL NEGATIVE: 1
RESPIRATORY NEGATIVE: 1

## 2021-01-05 NOTE — PROGRESS NOTES
Subjective:      Patient ID: Laura Huston is a 30 y.o. female. Vaginal Itching  The patient's primary symptoms include a genital odor. This is a new problem. The current episode started 1 to 4 weeks ago. The problem occurs intermittently. The problem has been gradually improving. The patient is experiencing no pain. Affected Side: Urine pregnancy test negative. Associated symptoms include headaches. She has tried nothing for the symptoms. The treatment provided moderate relief. She is sexually active. It is unknown whether or not her partner has an STD. Her menstrual history has been regular. Review of Systems   Constitutional: Negative. HENT: Negative. Eyes: Negative. Respiratory: Negative. Cardiovascular: Negative. Gastrointestinal: Negative. Endocrine: Negative. Musculoskeletal: Positive for myalgias and neck pain. Skin: Negative. Allergic/Immunologic: Negative. Neurological: Positive for headaches. Hematological: Negative. Psychiatric/Behavioral: Positive for dysphoric mood. The patient is nervous/anxious. Past family and social history unremarkable. Diagnosis Orders   1. Subacute vaginitis     2. Chronic rhinitis     3. Encounter for routine gynecological examination with Papanicolaou smear of cervix  Chlamydia/GC DNA, Thin Prep    Human Papillomavirus (HPV) DNA Probe Thin Prep High Risk   4. Intractable chronic migraine without aura and without status migrainosus     5. Muscle spasm     6. Chronic tension-type headache, intractable     7. Essential hypertension     8. S/P cervical discectomy     9. Anxiety and depression           Objective:   Physical Exam  Vitals signs and nursing note reviewed. Constitutional:       Appearance: She is well-developed. HENT:      Head: Normocephalic and atraumatic.       Right Ear: External ear normal.      Left Ear: External ear normal.   Eyes:      Conjunctiva/sclera: Conjunctivae normal.      Pupils: Pupils are equal, round, and reactive to light. Neck:      Musculoskeletal: Normal range of motion and neck supple. Cardiovascular:      Rate and Rhythm: Normal rate and regular rhythm. Heart sounds: Normal heart sounds. Pulmonary:      Effort: Pulmonary effort is normal.      Breath sounds: Normal breath sounds. Abdominal:      General: Bowel sounds are normal.      Palpations: Abdomen is soft. Genitourinary:     Vagina: Normal.      Comments: Form consent for Pap and breast exam-okay to proceed. Female staff present all time  Urine pregnancy test negative  External unremarkable  Speculum exam-unremarkable mucosa, small amount of mucoid secretion, cervix is long and mobile, os is closed. Cervical endocervical wet prep to the lab. We will add GC chlamydia and HPV  Bimanual examination is negative for cervical motion tenderness or adnexal tenderness. No abdominal symptoms  Bimanual examination of the breast is nonyielding. Symmetrical bilateral breast.  Regional lymph nodes unremarkable. History of left breast cyst removal at age 26 that according to patient information was benign  Musculoskeletal: Normal range of motion. Comments: History of C-spine decompression. No apparent sign of myelopathy. Neurologically intact  Chronic pain syndrome. She is scheduled to be seen by pain management in coordination with neurology   Skin:     General: Skin is warm and dry. Neurological:      Mental Status: She is alert and oriented to person, place, and time. Deep Tendon Reflexes: Reflexes are normal and symmetric. Comments: Migraine with aura. He is established with neurology   Psychiatric:      Comments: Anxiety/depression. She is established with psych  Chronic pain syndrome  Chronic tension headaches         Assessment:       Diagnosis Orders   1. Subacute vaginitis     2. Chronic rhinitis     3.  Encounter for routine gynecological examination with Papanicolaou smear of cervix  Chlamydia/GC DNA, Thin Prep    Human Papillomavirus (HPV) DNA Probe Thin Prep High Risk   4. Intractable chronic migraine without aura and without status migrainosus     5. Muscle spasm     6. Chronic tension-type headache, intractable     7. Essential hypertension     8. S/P cervical discectomy     9. Anxiety and depression             Plan:      71-year-old female is presented requesting routine pelvic exam and breast exam.  Urine pregnancy test was negative. Female staff present all time. Clinical evaluation was rather benign. Specimen sent to the lab. We will include GC chlamydia and HPV. No history of STDs. She is monogamous with her   History of chronic pain syndrome. Her neurologist has consulted pain management. History of migraine without aura. No trigger points or tenderness in parietal region. She is established with neurology on Zanaflex, Emgality, clonidine, Fioricet, Maxalt, gabapentin, Cymbalta, meloxicam  Anxiety/depression on Cymbalta. Patient in needs of lots of reassurance. She is established with psych. 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 Neurologically intact. She continues to have full range of motion without any restriction  Remote past history of excision of left breast cyst done at age 26 that according to patient was benign. History of muscle spasm. Continue symptomatic treatment with muscle relaxants and gabapentin  History of allergies allergic rhinitis. No recent flare.   She may use over-the-counter antihistamine and nasal saline  She denies tobacco, excessive alcohol or illicit drug use  Med list and available labs reviewed, discussed with patient, questions answered  She is encouraged to call for any concern  This note is created with a voice recognition program and while intend to generate a document that accurately reflects the content of the visit, no guarantee can be provided that every mistake has been identified and corrected by editing.           Salma Chun MD

## 2021-01-06 LAB
CHLAMYDIA BY THIN PREP: NEGATIVE
HPV SAMPLE: NORMAL
HPV, GENOTYPE 16: NOT DETECTED
HPV, GENOTYPE 18: NOT DETECTED
HPV, HIGH RISK OTHER: NOT DETECTED
HPV, INTERPRETATION: NORMAL
N. GONORRHOEAE DNA, THIN PREP: NEGATIVE

## 2021-01-08 ENCOUNTER — INITIAL CONSULT (OUTPATIENT)
Dept: PAIN MANAGEMENT | Age: 36
End: 2021-01-08
Payer: COMMERCIAL

## 2021-01-08 VITALS
HEIGHT: 64 IN | TEMPERATURE: 97.8 F | OXYGEN SATURATION: 99 % | SYSTOLIC BLOOD PRESSURE: 143 MMHG | WEIGHT: 145 LBS | BODY MASS INDEX: 24.75 KG/M2 | HEART RATE: 82 BPM | DIASTOLIC BLOOD PRESSURE: 103 MMHG

## 2021-01-08 DIAGNOSIS — M54.12 CERVICAL RADICULOPATHY: ICD-10-CM

## 2021-01-08 DIAGNOSIS — M43.22 FUSION OF SPINE, CERVICAL REGION: ICD-10-CM

## 2021-01-08 DIAGNOSIS — M54.12 CERVICAL RADICULITIS: Primary | ICD-10-CM

## 2021-01-08 PROCEDURE — 99215 OFFICE O/P EST HI 40 MIN: CPT | Performed by: ANESTHESIOLOGY

## 2021-01-08 ASSESSMENT — ENCOUNTER SYMPTOMS
ALLERGIC/IMMUNOLOGIC NEGATIVE: 1
RESPIRATORY NEGATIVE: 1

## 2021-01-08 NOTE — PROGRESS NOTES
The patient is a 30 y. o. Non-/non  female. 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    She is seen for history of chronic neck and right arm pain  Onset of symptom more than 2 years ago  Report radiation of pain down right arm all the way to the fingers  Also report now pain going into the right half of the body  Describes the pain is constant throbbing burning shooting sensation  Associated symptoms include right arm numbness and tingling  No changes in bladder or bowel control  No history of fever chills or weight loss  Aggravating factors include any physical activity particularly are movement and lifting  Alleviating factors include ice application    No previous cervical spine injection history  Patient had a cervical MRI 2019 and was diagnosed with cervical disc herniation  She had a posterior cervical spine fusion surgery in June 2019 with Dr. Justyn Medina and [facility]heck at [facility]  Patient had initially good relief but then the symptoms gradually returned back  Pain is now constant and interfering with quality of life  She had EMG nerve testing and that showed right-sided cervical radiculopathy at C5-C6  Recent cervical spine MRI showed good decompression with excellent CSF flow at the surgical level  At the lower level C4-C5 there is a still a central disc bulge  Pain score today  7  1. Location:neck  2. Radiation: right arm   3. Character: numb/ ting/ sore  5. Duration:  Constant   6. Onset: months  7. Did an injury cause pain: no, fusion in June 2019 did help but then pain came back   8. Aggravating factors: activity/ lifting  9. Alleviating factors: ice  10.  Associated symptoms (numbness / tingling / weakness):  yes  -Where at: right arm  -Down into finger tips or toes (specify which finger or toes):yes -constant or intermitting: constant  11. Red Flags: (weight loss / chills / loss of bladder or bowel control):none     Previous management history  1. Previous diagnostic workup: (Imaging/EMG)   CT, MRI, or Xray: yes  What part of the body: neck/ back   [facility]   What year or specific date: N/A  EMG:  yes, two years. Dr. Svetlana Skelton    2. Previous non interventional treatments tried:  chiropractor or physical therapy: PT  What part of the body:neck/ back  What facility was it done [facility]: [facility]   How long ago was it last tried:year  Did it work: No  Did they complete it:Yes    3. Previous Medications tried  NSAID's: yes  Neurontin: yes  Lyrica: no  Trycyclic antidepressant (Ellavil / Pamelor ): yes  Cymbalta: yes  Opioids (Ultram / Vicodin / Percocet / Morphine / Dilaudid / Oramorph/ Fentanyl etc.): tylenol #3   Last Pain medication taken (name of med and date): last two nights     4. Previous Interventional pain procedures tried:  What kind of injection: none    5.  Previous surgeries for pain  What part of the body did they have the surgery: neck C3-4  What physician did the surgery: Dr. Queenie Mann did they have the surgery done:[facility]  Date of Surgery:06/20/2019    Social History:  Marital status:   Employment History: yes  Working  Yes  Full time Or Part time: part-time  Disability  No   Legal Issues related to pain complaint: No     Pain Disability Index score :     Lab Results   Component Value Date    LABA1C 4.8 08/11/2020     Lab Results   Component Value Date    EAG 91 08/11/2020         Informant: patient        Past Medical History:   Diagnosis Date    Headache 2003    MIGRAINES    Hypertension 05/2019    ON RX    Psoriasis     BEHIND EARS      Past Surgical History:   Procedure Laterality Date    CERVICAL LAMINECTOMY N/A 6/24/2019 POSTERIOR CERVICAL FUSION C3-4  (GLOBUS, SSEP EVOKES CONF# [id number] - ANEL. ) performed by Román Trujillo MD at [address]  2007, 2014    x2    CYST REMOVAL Left 2004    breast    NECK SURGERY  06/24/2019    POSTERIOR CERVICAL FUSION C3-4      TUBAL LIGATION  2014     Social History     Socioeconomic History    Marital status:      Spouse name: None    Number of children: None    Years of education: None    Highest education level: None   Occupational History    None   Social Needs    Financial resource strain: None    Food insecurity     Worry: None     Inability: None    Transportation needs     Medical: None     Non-medical: None   Tobacco Use    Smoking status: Never Smoker    Smokeless tobacco: Never Used   Substance and Sexual Activity    Alcohol use: Yes     Alcohol/week: 0.0 standard drinks     Comment: MIXED DRINKS 2 TO 3 A MONTH    Drug use: No    Sexual activity: Yes     Partners: Male   Lifestyle    Physical activity     Days per week: None     Minutes per session: None    Stress: None   Relationships    Social connections     Talks on phone: None     Gets together: None     Attends Sabianist service: None     Active member of club or organization: None     Attends meetings of clubs or organizations: None     Relationship status: None    Intimate partner violence     Fear of current or ex partner: None     Emotionally abused: None     Physically abused: None     Forced sexual activity: None   Other Topics Concern    None   Social History Narrative    None     Family History   Problem Relation Age of Onset    High Blood Pressure Father     Migraines Sister     Migraines Paternal Grandmother     Heart Disease Paternal Grandmother     Cancer Paternal Grandfather         brain     No Known Allergies  Patient has no known allergies.    Vitals:    01/08/21 1044   BP: (!) 139/99   Pulse: 82   Temp: 97.8 °F (36.6 °C)   SpO2: 99% Current Outpatient Medications   Medication Sig Dispense Refill    cloNIDine (CATAPRES) 0.1 MG tablet 1 tablet p.o. twice daily 180 tablet 0    tiZANidine (ZANAFLEX) 4 MG tablet Take one tab nightly and half tab in am as needed for spasms 135 tablet 1    gabapentin (NEURONTIN) 300 MG capsule TAKE ONE CAP three times daily at 8 am, 2 pm and 8 pm 270 capsule 1    butalbital-acetaminophen-caffeine (FIORICET, ESGIC) -40 MG per tablet TAKE ONE TABLET BY MOUTH DAILY AS NEEDED FOR SEVER HEADACHE 40 tablet 0    Galcanezumab-gnlm (EMGALITY) 120 MG/ML SOAJ INJECT UNDER THE SKIN ONCE A MONTH 3 pen 2    meloxicam (MOBIC) 7.5 MG tablet Take 1 tablet by mouth daily 30 tablet 0    DULoxetine (CYMBALTA) 60 MG extended release capsule Take 1 capsule by mouth daily 30 capsule 3    Multiple Vitamins-Minerals (MULTIVITAMIN ADULT PO) Take by mouth daily      VITAMIN D PO Take by mouth daily      Calcium Carbonate Antacid (CALCIUM CARBONATE PO) Take by mouth daily      rizatriptan (MAXALT) 10 MG tablet Take 1 tablet by mouth once as needed for Migraine May repeat in 2 hours if needed 9 tablet 3     No current facility-administered medications for this visit. Review of Systems   Constitutional: Negative. Negative for fever. HENT: Negative. Respiratory: Negative. Musculoskeletal: Positive for neck pain. Allergic/Immunologic: Negative. Neurological: Positive for numbness and headaches. Hematological: Negative. All other systems reviewed and are negative. Objective:  General Appearance:  Well-appearing, in no acute distress, uncomfortable and in pain. Vital signs: (most recent): Blood pressure (!) 139/99, pulse 82, temperature 97.8 °F (36.6 °C), height 5' 4\" (1.626 m), weight 145 lb (65.8 kg), SpO2 99 %, not currently breastfeeding. Vital signs are normal.  No fever. Output: Producing urine and producing stool.     HEENT: Normal HEENT exam. Lungs:  Normal effort and normal respiratory rate. Breath sounds clear to auscultation. She is not in respiratory distress. No decreased breath sounds. Heart: Normal rate. Regular rhythm. Extremities: Normal range of motion. There is no deformity. Neurological: Patient is alert and oriented to person, place and time. Patient has normal coordination. Pupils:  Pupils are equal, round, and reactive to light. Pupils are equal.   Skin:  Warm and dry. No rash or cyanosis. 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    She is seen for history of chronic neck and right arm pain  Onset of symptom more than 2 years ago  Report radiation of pain down right arm all the way to the fingers  Also report now pain going into the right half of the body  Describes the pain is constant throbbing burning shooting sensation  Associated symptoms include right arm numbness and tingling  No changes in bladder or bowel control  No history of fever chills or weight loss  Aggravating factors include any physical activity particularly are movement and lifting  Alleviating factors include ice application    No previous cervical spine injection history  Patient had a cervical MRI 2019 and was diagnosed with cervical disc herniation  She had a posterior cervical spine fusion surgery in June 2019 with Dr. Kiara Barnes and recheck at [facility]  Patient had initially good relief but then the symptoms gradually returned back  Pain is now constant and interfering with quality of life  She had EMG nerve testing and that showed right-sided cervical radiculopathy at C5-C6  Recent cervical spine MRI showed good decompression with excellent CSF flow at the surgical level  At the lower level C4-C5 there is a still a central disc bulge  Pain score today  7  1.  Location:neck    EXAMINATION: MRI OF THE CERVICAL SPINE WITHOUT AND WITH CONTRAST  11/5/2020 12:55 pm: Status post posterior instrumented fusion of C3-4. Degenerative disc disease without spinal canal or foraminal stenosis. No abnormal signal or enhancement in the cervical spinal cord. 1. Cervical radiculitis    2. Fusion of spine, cervical region    3. Cervical radiculopathy        MRI cervical spine  Report was reviewed  Images were reviewed independently  Images were shown to the patient  Finding discussed in detail with patient  Shows good CSF flow at the surgical site suggesting good decompression  At the adjacent level C4-C5 there is slight a small disc bulge with a displacement of the CSF but no myelopathic changes in the spinal cord    I will recommend for cervical epidural injection and also refer for patient for cervical L-spine physical therapy    If these measures fail then I will consider for neuromodulation trial after surgical opinion    Plan discussed with patient  Patient voiced understanding and is agreeable to the plan  Orders Placed This Encounter   Procedures    Ambulatory referral to Physical Therapy    IA NJX DX/THER SBST INTRLMNR CRV/THRC W/IMG GDN      No orders of the defined types were placed in this encounter.          Electronically signed by Parish Ryan MD on 1/8/2021 at 12:59 PM

## 2021-01-25 ENCOUNTER — HOSPITAL ENCOUNTER (OUTPATIENT)
Age: 36
Setting detail: OUTPATIENT SURGERY
Discharge: HOME | End: 2021-01-25
Attending: ANESTHESIOLOGY | Admitting: ANESTHESIOLOGY
Payer: COMMERCIAL

## 2021-01-25 ENCOUNTER — APPOINTMENT (OUTPATIENT)
Dept: GENERAL RADIOLOGY | Age: 36
End: 2021-01-25
Attending: ANESTHESIOLOGY
Payer: COMMERCIAL

## 2021-01-25 VITALS
HEIGHT: 64 IN | RESPIRATION RATE: 16 BRPM | BODY MASS INDEX: 23.9 KG/M2 | OXYGEN SATURATION: 99 % | HEART RATE: 64 BPM | SYSTOLIC BLOOD PRESSURE: 132 MMHG | DIASTOLIC BLOOD PRESSURE: 90 MMHG | TEMPERATURE: 98.3 F | WEIGHT: 140 LBS

## 2021-01-25 LAB — HCG, PREGNANCY URINE (POC): NEGATIVE

## 2021-01-25 PROCEDURE — 7100000010 HC PHASE II RECOVERY - FIRST 15 MIN: Performed by: ANESTHESIOLOGY

## 2021-01-25 PROCEDURE — 62321 NJX INTERLAMINAR CRV/THRC: CPT | Performed by: ANESTHESIOLOGY

## 2021-01-25 PROCEDURE — 2709999900 HC NON-CHARGEABLE SUPPLY: Performed by: ANESTHESIOLOGY

## 2021-01-25 PROCEDURE — 3600000050 HC PAIN LEVEL 1 BASE: Performed by: ANESTHESIOLOGY

## 2021-01-25 PROCEDURE — 3600000051 HC PAIN LEVEL 1 ADDL 15 MIN: Performed by: ANESTHESIOLOGY

## 2021-01-25 PROCEDURE — 6360000004 HC RX CONTRAST MEDICATION: Performed by: ANESTHESIOLOGY

## 2021-01-25 PROCEDURE — 99152 MOD SED SAME PHYS/QHP 5/>YRS: CPT | Performed by: ANESTHESIOLOGY

## 2021-01-25 PROCEDURE — 7100000011 HC PHASE II RECOVERY - ADDTL 15 MIN: Performed by: ANESTHESIOLOGY

## 2021-01-25 PROCEDURE — 3209999900 FLUORO FOR SURGICAL PROCEDURES

## 2021-01-25 PROCEDURE — 81025 URINE PREGNANCY TEST: CPT

## 2021-01-25 PROCEDURE — 6360000002 HC RX W HCPCS: Performed by: ANESTHESIOLOGY

## 2021-01-25 RX ORDER — DULOXETIN HYDROCHLORIDE 60 MG/1
60 CAPSULE, DELAYED RELEASE ORAL DAILY
Qty: 30 CAPSULE | Refills: 2 | OUTPATIENT
Start: 2021-01-25

## 2021-01-25 RX ORDER — SODIUM CHLORIDE 0.9 % (FLUSH) 0.9 %
10 SYRINGE (ML) INJECTION PRN
Status: DISCONTINUED | OUTPATIENT
Start: 2021-01-25 | End: 2021-01-25 | Stop reason: HOSPADM

## 2021-01-25 RX ORDER — DEXAMETHASONE SODIUM PHOSPHATE 10 MG/ML
INJECTION INTRAMUSCULAR; INTRAVENOUS PRN
Status: DISCONTINUED | OUTPATIENT
Start: 2021-01-25 | End: 2021-01-25 | Stop reason: ALTCHOICE

## 2021-01-25 RX ORDER — MIDAZOLAM HYDROCHLORIDE 1 MG/ML
INJECTION INTRAMUSCULAR; INTRAVENOUS PRN
Status: DISCONTINUED | OUTPATIENT
Start: 2021-01-25 | End: 2021-01-25 | Stop reason: ALTCHOICE

## 2021-01-25 RX ORDER — FENTANYL CITRATE 50 UG/ML
INJECTION, SOLUTION INTRAMUSCULAR; INTRAVENOUS PRN
Status: DISCONTINUED | OUTPATIENT
Start: 2021-01-25 | End: 2021-01-25 | Stop reason: ALTCHOICE

## 2021-01-25 ASSESSMENT — PAIN DESCRIPTION - ORIENTATION: ORIENTATION: LOWER

## 2021-01-25 ASSESSMENT — PAIN DESCRIPTION - DESCRIPTORS: DESCRIPTORS: SORE

## 2021-01-25 ASSESSMENT — PAIN SCALES - GENERAL
PAINLEVEL_OUTOF10: 7
PAINLEVEL_OUTOF10: 8

## 2021-01-25 ASSESSMENT — PAIN DESCRIPTION - LOCATION: LOCATION: NECK

## 2021-01-25 ASSESSMENT — PAIN - FUNCTIONAL ASSESSMENT: PAIN_FUNCTIONAL_ASSESSMENT: 0-10

## 2021-01-25 NOTE — OP NOTE
Operative Note      Patient: Ga Clay  YOB: 1985  MRN: [id number]    Date of Procedure: 1/25/2021    Pre-Op Diagnosis: DX CERVICAL RADICULITIS      FUSION OF SPINE, CERVICAL REGION      CERVICAL RADICULOPATHY    Post-Op Diagnosis: Same       Procedure(s):  CERVICAL EPIDURAL STEROID INJECTION C7-T1    Surgeon(s):  Nikki Jacques MD    Assistant:   * No surgical staff found *    Anesthesia: IV Sedation    Estimated Blood Loss (mL): Minimal    Complications: None    Specimens:   * No specimens in log *    Implants:  * No implants in log *      Drains: * No LDAs found *    Findings: N/A    Detailed Description of Procedure:   Preoperative Diagnosis: Cervical disc herniation and radiculitis  Postoperative Diagnosis:  Cervical disc herniation and radiculitis    Procedure Performed:  Cervical epidural steroid injection under fluoroscopy guidance    BLOOD LOSS: NONE    Procedure: The Patient was seen in the preop area, chart was reviewed, informed consent was obtained. Patient was taken to procedure room and was placed in prone position. Vital signs were monitored through out the  Procedure. A time out was completed. The site was prepped and draped in sterile manner. The target point was marked at The interlaminar space at C7/T1 . Skin and deep tissues were anesthetized with 1 % lidocaine. A  19-gauge Tuohy epidural needlele was advanced  under fluoroscopy guidance in AP view. Epidural space was identified using ELVIS technique. A 19 G catheter was threaded up in epidural space for 2 levels. Position was confirmed in Lateral view. Then after negative aspiration contrast dye was injected with live fluoroscopy in AP views that showed  spread of the contrast in the epidural space  and no vascular runoff or intrathecal spread. Finally 5 ml of treatment solution containing 4 ml of PF NS and 1 ml of dexamethasone 10 mg / ml was injected.   The needle was removed and a Band-Aid was placed over the needle insertion site. The patient's vital signs remained stable and the patient tolerated the procedure well. Electronically signed by Phong Jiménez MD on 1/25/2021 at 4:12 PM  SEDATION NOTE:    ASA CLASSIFICATION  2  MP   CLASSIFICATION  2    · Moderate intravenous conscious sedation was supervised by Dr. Aisha Child  . The patient was independently monitored by a Registered Nurse assigned to the Procedure Room  . Monitoring included automated blood pressure, continuous EKG, Capnography and continuous pulse oximetry. . The detailed Conscious Record is permanently stored in the [place] [id number].      The following is the conscious sedation record;  Start Time: 1556  End times:  1611  Duration:  15 MINUTES  MEDS GIVEN 2 MG VERSED AND 50 MCG FENTANYL

## 2021-01-25 NOTE — H&P
History and Physical Update    Pt Name: Chato Licea  MRN: [id number]  YOB: 1985  Date of evaluation: 1/25/2021      [x] I have reviewed the Pain Management Consult Note dated 1/8/21 in epic which meets the criteria for an Interval History and Physical note and is attached below. [x] I have examined  Chato Licea  There are no changes to the patient who is scheduled for a cervical epidural steroid injection C7-T1 by Dr Bruce Gay for cervical  Hx cervical fusion C3- C4 6/24/19   C/o sharp neck pain rated 6-7/10 with decreased sensation to right face orbital and temporal area,  radiation to anterior/posterior upper right thorax, down lateral right arm with numbness 3-5th fingers right hand. The patient denies visual disturbances, speech changes, sudden extremity weakness, fever, chills, wheezing, cough, increased SOB, chest pain, open sores or wounds. PMH, Surgical History, Social History, Psych, and Family History reviewed and updated in EPIC in appropriate section. Vital signs: BP (!) 123/91   Pulse 86   Temp 96 °F (35.6 °C) (Temporal)   Resp 16   SpO2 99%     Allergies:  Patient has no known allergies. Medications:    Prior to Admission medications    Medication Sig Start Date End Date Taking?  Authorizing Provider   cloNIDine (CATAPRES) 0.1 MG tablet 1 tablet p.o. twice daily 12/30/20   Chanell Rosenberg MD   tiZANidine (ZANAFLEX) 4 MG tablet Take one tab nightly and half tab in am as needed for spasms 12/22/20   Dima Gloria MD   gabapentin (NEURONTIN) 300 MG capsule TAKE ONE CAP three times daily at 8 am, 2 pm and 8 pm 12/22/20 12/16/21  Dima Gloria MD   rizatriptan (MAXALT) 10 MG tablet Take 1 tablet by mouth once as needed for Migraine May repeat in 2 hours if needed 12/22/20 1/5/21  Dima Gloria MD   butalbital-acetaminophen-caffeine (FIORICET, ESGIC) -40 MG per tablet TAKE ONE TABLET BY MOUTH DAILY AS NEEDED FOR SEVER HEADACHE 12/22/20   Charles Wasserman MD   Galcanezumab-gnlm (EMGALITY) 120 MG/ML SOAJ INJECT UNDER THE SKIN ONCE A MONTH 12/22/20   Anel Carver MD   meloxicam (MOBIC) 7.5 MG tablet Take 1 tablet by mouth daily 12/22/20   Anel Carver MD   DULoxetine (CYMBALTA) 60 MG extended release capsule Take 1 capsule by mouth daily 11/10/20   Salma Chun MD   Multiple Vitamins-Minerals (MULTIVITAMIN ADULT PO) Take by mouth daily    Historical Provider, MD   VITAMIN D PO Take by mouth daily    Historical Provider, MD   Calcium Carbonate Antacid (CALCIUM CARBONATE PO) Take by mouth daily    Historical Provider, MD         This is a 30 y.o. female who is pleasant, cooperative, alert and oriented x3, in no acute distress. Heart: Heart sounds are normal.  HR 86 regular rate and rhythm without murmur, gallop or rub. Lungs: Normal respiratory effort with equal expansion, good air exchange, unlabored and clear to auscultation without wheezes or rales bilaterally   Abdomen: soft, nontender, nondistended with bowel sounds. Neuro/extremities:  Cranial nerves II-XII grossly intact  Equal bilateral upper lower extremity strength       Labs:  No results for input(s): HGB, HCT, WBC, MCV, PLT, NA, K, CL, CO2, BUN, CREATININE, GLUCOSE, INR, PROTIME, APTT, AST, ALT, LABALBU, HCG in the last 720 hours. No results for input(s): COVID19 in the last 720 hours. Jeana LYN, ANP-BC  Electronically signed 1/25/2021 at 3:43 PM      Trudy Lou MD   Physician   Specialty:  Pain Management   Progress Notes   Signed   Encounter Date:  1/8/2021         Related encounter: Initial consult from 1/8/2021 in [facility] [place]         Signed        Expand AllCollapse All      The patient is a 30 y. o. Non-/non  female.      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     She is seen for history of chronic neck and right arm pain  Onset of symptom more than 2 years ago  Report radiation of pain down right arm all the way to the fingers  Also report now pain going into the right half of the body  Describes the pain is constant throbbing burning shooting sensation  Associated symptoms include right arm numbness and tingling  No changes in bladder or bowel control  No history of fever chills or weight loss  Aggravating factors include any physical activity particularly are movement and lifting  Alleviating factors include ice application     No previous cervical spine injection history  Patient had a cervical MRI 2019 and was diagnosed with cervical disc herniation  She had a posterior cervical spine fusion surgery in June 2019 with Dr. Mera Arshad and recheck at [place]  Patient had initially good relief but then the symptoms gradually returned back  Pain is now constant and interfering with quality of life  She had EMG nerve testing and that showed right-sided cervical radiculopathy at C5-C6  Recent cervical spine MRI showed good decompression with excellent CSF flow at the surgical level  At the lower level C4-C5 there is a still a central disc bulge  Pain score today  7  1. Location:neck  2. Radiation: right arm   3. Character: numb/ ting/ sore  5. Duration:  Constant   6. Onset: months  7. Did an injury cause pain: no, fusion in June 2019 did help but then pain came back   8. Aggravating factors: activity/ lifting  9. Alleviating factors: ice  10. Associated symptoms (numbness / tingling / weakness):  yes  -Where at: right arm  -Down into finger tips or toes (specify which finger or toes):yes   -constant or intermitting: constant  11. Red Flags: (weight loss / chills / loss of bladder or bowel control):none      Previous management history  1.  Previous diagnostic workup: (Imaging/EMG)   CT, MRI, or Xray: yes  What part of the body: neck/ back   [facility]   What year or specific date: N/A  EMG:  yes, two years. Dr. Fabiola Huynh     2. Previous non interventional treatments tried:  chiropractor or physical therapy: PT  What part of the body:neck/ back  What facility was it done at: [facility]   How long ago was it last tried:year  Did it work: No  Did they complete it:Yes     3. Previous Medications tried  NSAID's: yes  Neurontin: yes  Lyrica: no  Trycyclic antidepressant (Ellavil / Pamelor ): yes  Cymbalta: yes  Opioids (Ultram / Vicodin / Percocet / Morphine / Dilaudid / Oramorph/ Fentanyl etc.): tylenol #3   Last Pain medication taken (name of med and date): last two nights      4. Previous Interventional pain procedures tried:  What kind of injection: none     5. Previous surgeries for pain  What part of the body did they have the surgery: neck C3-4  What physician did the surgery: Dr. Skip Alston [facility]  Date of Surgery:06/20/2019     Social History:  Marital status:   Employment History: yes  Working  Yes  Full time Or Part time: part-time  Disability  No   Legal Issues related to pain complaint: No      Pain Disability Index score :            Lab Results   Component Value Date     LABA1C 4.8 08/11/2020            Lab Results   Component Value Date     EAG 91 08/11/2020            Informant: patient           Past Medical History        Past Medical History:   Diagnosis Date    Headache 2003     MIGRAINES    Hypertension 05/2019     ON RX    Psoriasis       BEHIND EARS         Past Surgical History         Past Surgical History:   Procedure Laterality Date    CERVICAL LAMINECTOMY N/A 6/24/2019     POSTERIOR CERVICAL FUSION C3-4  (GLOBUS, SSEP [place]OKES CONF# [id number] - [facility]. ) performed by Kari Schneider MD at [address]   2007, 2014     x2    CYST REMOVAL Left 2004     breast    NECK SURGERY (CATAPRES) 0.1 MG tablet 1 tablet p.o. twice daily 180 tablet 0    tiZANidine (ZANAFLEX) 4 MG tablet Take one tab nightly and half tab in am as needed for spasms 135 tablet 1    gabapentin (NEURONTIN) 300 MG capsule TAKE ONE CAP three times daily at 8 am, 2 pm and 8 pm 270 capsule 1    butalbital-acetaminophen-caffeine (FIORICET, ESGIC) -40 MG per tablet TAKE ONE TABLET BY MOUTH DAILY AS NEEDED FOR SEVER HEADACHE 40 tablet 0    Galcanezumab-gnlm (EMGALITY) 120 MG/ML SOAJ INJECT UNDER THE SKIN ONCE A MONTH 3 pen 2    meloxicam (MOBIC) 7.5 MG tablet Take 1 tablet by mouth daily 30 tablet 0    DULoxetine (CYMBALTA) 60 MG extended release capsule Take 1 capsule by mouth daily 30 capsule 3    Multiple Vitamins-Minerals (MULTIVITAMIN ADULT PO) Take by mouth daily        VITAMIN D PO Take by mouth daily        Calcium Carbonate Antacid (CALCIUM CARBONATE PO) Take by mouth daily        rizatriptan (MAXALT) 10 MG tablet Take 1 tablet by mouth once as needed for Migraine May repeat in 2 hours if needed 9 tablet 3      No current facility-administered medications for this visit. Review of Systems   Constitutional: Negative. Negative for fever. HENT: Negative. Respiratory: Negative. Musculoskeletal: Positive for neck pain. Allergic/Immunologic: Negative. Neurological: Positive for numbness and headaches. Hematological: Negative. All other systems reviewed and are negative. Objective:  General Appearance:  Well-appearing, in no acute distress, uncomfortable and in pain. Vital signs: (most recent): Blood pressure (!) 139/99, pulse 82, temperature 97.8 °F (36.6 °C), height 5' 4\" (1.626 m), weight 145 lb (65.8 kg), SpO2 99 %, not currently breastfeeding. Vital signs are normal.  No fever. Output: Producing urine and producing stool. HEENT: Normal HEENT exam.    Lungs:  Normal effort and normal respiratory rate. Breath sounds clear to auscultation.   She is not in respiratory distress. No decreased breath sounds. Heart: Normal rate. Regular rhythm. Extremities: Normal range of motion. There is no deformity. Neurological: Patient is alert and oriented to person, place and time. Patient has normal coordination. Pupils:  Pupils are equal, round, and reactive to light. Pupils are equal.   Skin:  Warm and dry. No rash or cyanosis. 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     She is seen for history of chronic neck and right arm pain  Onset of symptom more than 2 years ago  Report radiation of pain down right arm all the way to the fingers  Also report now pain going into the right half of the body  Describes the pain is constant throbbing burning shooting sensation  Associated symptoms include right arm numbness and tingling  No changes in bladder or bowel control  No history of fever chills or weight loss  Aggravating factors include any physical activity particularly are movement and lifting  Alleviating factors include ice application     No previous cervical spine injection history  Patient had a cervical MRI 2019 and was diagnosed with cervical disc herniation  She had a posterior cervical spine fusion surgery in June 2019 with Dr. Ester Perez and recheck at [place]  Patient had initially good relief but then the symptoms gradually returned back  Pain is now constant and interfering with quality of life  She had EMG nerve testing and that showed right-sided cervical radiculopathy at C5-C6  Recent cervical spine MRI showed good decompression with excellent CSF flow at the surgical level  At the lower level C4-C5 there is a still a central disc bulge  Pain score today  7  1. Location:neck     EXAMINATION: MRI OF THE CERVICAL SPINE WITHOUT AND WITH CONTRAST  11/5/2020 12:55 pm:    Status post posterior instrumented fusion of C3-4. Degenerative disc disease without spinal canal or foraminal stenosis.   No abnormal signal or enhancement in the cervical spinal cord. 1. Cervical radiculitis    2. Fusion of spine, cervical region    3. Cervical radiculopathy        MRI cervical spine  Report was reviewed  Images were reviewed independently  Images were shown to the patient  Finding discussed in detail with patient  Shows good CSF flow at the surgical site suggesting good decompression  At the adjacent level C4-C5 there is slight a small disc bulge with a displacement of the CSF but no myelopathic changes in the spinal cord     I will recommend for cervical epidural injection and also refer for patient for cervical L-spine physical therapy     If these measures fail then I will consider for neuromodulation trial after surgical opinion     Plan discussed with patient  Patient voiced understanding and is agreeable to the plan      Orders Placed This Encounter   Procedures    Ambulatory referral to Physical Therapy    UT NJX DX/THER SBST INTRLMNR CRV/THRC W/IMG GDN        Encounter Medications    No orders of the defined types were placed in this encounter.             Electronically signed by Latanya Fishman MD on 1/8/2021 at 12:59 PM            Revision History

## 2021-01-25 NOTE — TELEPHONE ENCOUNTER
Shahnaz Mathews is calling to request a refill on the following medication(s):    Medication Request:  Requested Prescriptions     Pending Prescriptions Disp Refills    DULoxetine (CYMBALTA) 60 MG extended release capsule [Pharmacy Med Name: Duloxetine 60mg Delayed-Release Capsule] 30 capsule 2     Sig: Take 1 capsule by mouth daily.      Last filled 11/10/20 30 day 3 refill  Not due    Last Visit Date (If Applicable):  3/0/0775    Next Visit Date:    Visit date not found

## 2021-01-29 DIAGNOSIS — M79.2 RADICULAR PAIN IN RIGHT ARM: ICD-10-CM

## 2021-01-29 DIAGNOSIS — M25.511 PAIN IN JOINT OF RIGHT SHOULDER: ICD-10-CM

## 2021-01-29 RX ORDER — MELOXICAM 7.5 MG/1
7.5 TABLET ORAL DAILY
Qty: 30 TABLET | Refills: 0 | Status: SHIPPED | OUTPATIENT
Start: 2021-01-29 | End: 2021-02-26

## 2021-01-29 NOTE — TELEPHONE ENCOUNTER
Pharmacy requesting a  refill of Meloxicam 7.5mg.       Medication active on med list yes      Date of last prescription 12/22/2020  with 0 refills verified on 01/29/2021    verified by RAYMUNDO CROCKETT      Date of last appointment 12/22/2020    Next Visit Date:  2/16/2021

## 2021-02-08 ENCOUNTER — HOSPITAL ENCOUNTER (OUTPATIENT)
Age: 36
Setting detail: OUTPATIENT SURGERY
Discharge: HOME | End: 2021-02-08
Attending: ANESTHESIOLOGY | Admitting: ANESTHESIOLOGY
Payer: COMMERCIAL

## 2021-02-08 ENCOUNTER — APPOINTMENT (OUTPATIENT)
Dept: GENERAL RADIOLOGY | Age: 36
End: 2021-02-08
Attending: ANESTHESIOLOGY
Payer: COMMERCIAL

## 2021-02-08 VITALS
SYSTOLIC BLOOD PRESSURE: 132 MMHG | WEIGHT: 140 LBS | BODY MASS INDEX: 23.9 KG/M2 | RESPIRATION RATE: 16 BRPM | TEMPERATURE: 97.3 F | OXYGEN SATURATION: 100 % | HEIGHT: 64 IN | HEART RATE: 69 BPM | DIASTOLIC BLOOD PRESSURE: 83 MMHG

## 2021-02-08 PROBLEM — M54.12 CERVICAL RADICULITIS: Chronic | Status: ACTIVE | Noted: 2019-04-12

## 2021-02-08 LAB — HCG, PREGNANCY URINE (POC): NEGATIVE

## 2021-02-08 PROCEDURE — 99152 MOD SED SAME PHYS/QHP 5/>YRS: CPT | Performed by: ANESTHESIOLOGY

## 2021-02-08 PROCEDURE — 2709999900 HC NON-CHARGEABLE SUPPLY: Performed by: ANESTHESIOLOGY

## 2021-02-08 PROCEDURE — 6360000002 HC RX W HCPCS: Performed by: ANESTHESIOLOGY

## 2021-02-08 PROCEDURE — 3209999900 FLUORO FOR SURGICAL PROCEDURES

## 2021-02-08 PROCEDURE — 2580000003 HC RX 258: Performed by: ANESTHESIOLOGY

## 2021-02-08 PROCEDURE — 7100000010 HC PHASE II RECOVERY - FIRST 15 MIN: Performed by: ANESTHESIOLOGY

## 2021-02-08 PROCEDURE — 62321 NJX INTERLAMINAR CRV/THRC: CPT | Performed by: ANESTHESIOLOGY

## 2021-02-08 PROCEDURE — 7100000011 HC PHASE II RECOVERY - ADDTL 15 MIN: Performed by: ANESTHESIOLOGY

## 2021-02-08 PROCEDURE — 81025 URINE PREGNANCY TEST: CPT

## 2021-02-08 PROCEDURE — 6360000004 HC RX CONTRAST MEDICATION: Performed by: ANESTHESIOLOGY

## 2021-02-08 PROCEDURE — 3600000050 HC PAIN LEVEL 1 BASE: Performed by: ANESTHESIOLOGY

## 2021-02-08 RX ORDER — DEXAMETHASONE SODIUM PHOSPHATE 10 MG/ML
INJECTION INTRAMUSCULAR; INTRAVENOUS PRN
Status: DISCONTINUED | OUTPATIENT
Start: 2021-02-08 | End: 2021-02-08 | Stop reason: ALTCHOICE

## 2021-02-08 RX ORDER — SODIUM CHLORIDE 0.9 % (FLUSH) 0.9 %
10 SYRINGE (ML) INJECTION PRN
Status: DISCONTINUED | OUTPATIENT
Start: 2021-02-08 | End: 2021-02-08 | Stop reason: HOSPADM

## 2021-02-08 RX ORDER — SODIUM CHLORIDE 0.9 % (FLUSH) 0.9 %
10 SYRINGE (ML) INJECTION EVERY 12 HOURS SCHEDULED
Status: DISCONTINUED | OUTPATIENT
Start: 2021-02-08 | End: 2021-02-08 | Stop reason: HOSPADM

## 2021-02-08 RX ORDER — FENTANYL CITRATE 50 UG/ML
INJECTION, SOLUTION INTRAMUSCULAR; INTRAVENOUS PRN
Status: DISCONTINUED | OUTPATIENT
Start: 2021-02-08 | End: 2021-02-08 | Stop reason: ALTCHOICE

## 2021-02-08 RX ORDER — DULOXETIN HYDROCHLORIDE 60 MG/1
60 CAPSULE, DELAYED RELEASE ORAL DAILY
Qty: 90 CAPSULE | Refills: 1 | Status: SHIPPED | OUTPATIENT
Start: 2021-02-08 | End: 2021-07-28

## 2021-02-08 RX ORDER — MIDAZOLAM HYDROCHLORIDE 1 MG/ML
INJECTION INTRAMUSCULAR; INTRAVENOUS PRN
Status: DISCONTINUED | OUTPATIENT
Start: 2021-02-08 | End: 2021-02-08 | Stop reason: ALTCHOICE

## 2021-02-08 RX ADMIN — Medication 10 ML: at 10:59

## 2021-02-08 ASSESSMENT — PAIN DESCRIPTION - FREQUENCY: FREQUENCY: CONTINUOUS

## 2021-02-08 ASSESSMENT — PAIN DESCRIPTION - DESCRIPTORS
DESCRIPTORS: SHARP
DESCRIPTORS: ACHING

## 2021-02-08 ASSESSMENT — PAIN DESCRIPTION - PAIN TYPE: TYPE: SURGICAL PAIN;CHRONIC PAIN

## 2021-02-08 ASSESSMENT — PAIN SCALES - GENERAL
PAINLEVEL_OUTOF10: 7
PAINLEVEL_OUTOF10: 7
PAINLEVEL_OUTOF10: 3

## 2021-02-08 ASSESSMENT — PAIN - FUNCTIONAL ASSESSMENT: PAIN_FUNCTIONAL_ASSESSMENT: 0-10

## 2021-02-08 NOTE — TELEPHONE ENCOUNTER
Brett Fonseca is calling to request a refill on the following medication(s):    Medication Request:  Requested Prescriptions     Pending Prescriptions Disp Refills    DULoxetine (CYMBALTA) 60 MG extended release capsule 30 capsule 3     Sig: Take 1 capsule by mouth daily     Last filled 11/10/20 30 day 3 refill  Order pending     Last Visit Date (If Applicable):  5/4/8858    Next Visit Date:    Visit date not found

## 2021-02-08 NOTE — H&P
History and Physical Service   [facility]    HISTORY AND PHYSICAL EXAMINATION            Date of Evaluation: 2/8/2021  Patient name:  Marisol Beltran  MRN:   [id number]  YOB: 1985  PCP:    Carlos Cabrera MD    History Obtained From:     Patient, medical records    History of Present Illness: This is Marisol Beltran a 29 y.o. female who presents today for a CERVICAL STEROID INJECTION AT C/7-T/1  by Dr. Lilian Villegas for TREATMENT OF CERVICAL RADICULITIS AND RADICULOPATHY S/P CERVICAL FUSION. THE PATIENT TEACHES GYMNASTICS . SHE HAS HAD PROBLEMS WITH NECK AND SHOULDER PAIN FOR MONTHS. SHE HAD A CERVICAL FUSION IN June OF 2019. SHE RECOVERED AND FELT WELL UNTIL [address] (June ) IF 2020. SHE THEN BEGAN HAVING SEVERE NECK PAIN AS WELL AS NUMBNESS AND TINGLING RADIATING DOWN HER RIGHT ARM . SHE HAS LOST STRENGTH IN HER RIGHT ARM AND SHOULDER AND HAS INCREASED PAIN WITH ANY ACTIVITY. Sarah Robertson DOES NOT HAVE DIABETES, SHE DOES NOT TAKE BLOOD THINNERS. Past Medical History:     Past Medical History:   Diagnosis Date    Headache 2003    MIGRAINES    Hypertension 05/2019    ON RX    Psoriasis     BEHIND EARS        Past Surgical History:     Past Surgical History:   Procedure Laterality Date    CERVICAL LAMINECTOMY N/A 6/24/2019    POSTERIOR CERVICAL FUSION C3-4  (GLOBUS, SSEP [place] CONF# [id number] - [facility]. ) performed by Glen Centeno MD at [address]  2007, 2014    x2    CYST REMOVAL Left 2004    breast    NECK SURGERY  06/24/2019    POSTERIOR CERVICAL FUSION C3-4      PAIN MANAGEMENT PROCEDURE N/A 1/25/2021    CERVICAL EPIDURAL STEROID INJECTION C7-T1 performed by Gladis Castañeda MD at [address] [place] [address] 43  2014        Medications Prior to Admission:     Prior to Admission medications    Medication Sig Start Date End Date Taking? Authorizing Provider   meloxicam (MOBIC) 7.5 MG tablet Take 1 tablet by mouth daily.  1/29/21  Yes Rm Ho MD cloNIDine (CATAPRES) 0.1 MG tablet 1 tablet p.o. twice daily 12/30/20  Yes Jair Max MD   tiZANidine (ZANAFLEX) 4 MG tablet Take one tab nightly and half tab in am as needed for spasms 12/22/20  Yes Sundar Hicks MD   gabapentin (NEURONTIN) 300 MG capsule TAKE ONE CAP three times daily at 8 am, 2 pm and 8 pm 12/22/20 12/16/21 Yes Sundar Hicks MD   rizatriptan (MAXALT) 10 MG tablet Take 1 tablet by mouth once as needed for Migraine May repeat in 2 hours if needed 12/22/20 2/8/21 Yes Sundar Hicks MD   Galcanezumab-gnlm (EMGALITY) 120 MG/ML [address] A MONTH 12/22/20  Yes Sundar Hicks MD   DULoxetine (CYMBALTA) 60 MG extended release capsule Take 1 capsule by mouth daily 11/10/20  Yes Jair Max MD   Multiple Vitamins-Minerals (MULTIVITAMIN ADULT PO) Take by mouth daily   Yes Historical Provider, MD   butalbital-acetaminophen-caffeine (FIORICET, ESGIC) -40 MG per tablet TAKE ONE TABLET BY MOUTH DAILY AS NEEDED FOR SEVER HEADACHE 12/22/20   Sundar Hicks MD   VITAMIN D PO Take by mouth daily    Historical Provider, MD   Calcium Carbonate Antacid (CALCIUM CARBONATE PO) Take by mouth daily    Historical Provider, MD        Allergies:     Patient has no known allergies. Social History:     Tobacco:    reports that she has never smoked. She has never used smokeless tobacco.  Alcohol:      reports current alcohol use. Drug Use:  reports no history of drug use. Family History:     Family History   Problem Relation Age of Onset    High Blood Pressure Father     Migraines Sister     Migraines Paternal Grandmother     Heart Disease Paternal Grandmother     Cancer Paternal Grandfather         brain       Review of Systems:     Positive and Negative as described in HPI.     CONSTITUTIONAL:  negative for fevers, chills, sweats, fatigue, weight loss  HEENT:  negative for vision, hearing changes, runny nose, throat pain  RESPIRATORY:  negative for shortness of breath, cough, congestion, wheezing. CARDIOVASCULAR:  negative for chest pain, palpitations. HAS HYPERTENSION   GASTROINTESTINAL:  negative for nausea, vomiting, diarrhea, constipation, change in bowel habits, abdominal pain   GENITOURINARY:  negative for difficulty of urination, burning with urination, frequency   INTEGUMENT:  negative for rash, skin lesions, easy bruising   HEMATOLOGIC/LYMPHATIC:  negative for swelling/edema   ALLERGIC/IMMUNOLOGIC:  negative for urticaria , itching  ENDOCRINE:  negative increase in drinking, increase in urination, hot or cold intolerance  MUSCULOSKELETAL:  negative joint pains, muscle aches, swelling of joints  NEUROLOGICAL:  negative for headaches, dizziness, lightheadedness, numbness, pain, tingling extremities  BEHAVIOR/PSYCH:  IS BEING TREATED FOR ANXIETY    Physical Exam:   /84   Pulse 70   Temp 96 °F (35.6 °C) (Temporal)   Resp 16   Ht 5' 4\" (1.626 m)   Wt 140 lb (63.5 kg)   LMP 01/13/2021   SpO2 100%   BMI 24.03 kg/m²   Patient's last menstrual period was 01/13/2021. No obstetric history on file. No results for input(s): POCGLU in the last 72 hours. General Appearance:  alert, well appearing, and in no acute distress  Mental status: oriented to person, place, and time with normal affect  Head:  normocephalic, atraumatic. Eye: no icterus, redness, pupils equal and reactive, extraocular eye movements intact, conjunctiva clear  Ear: normal external ear, no discharge, hearing intact  Nose:  no drainage noted  Mouth: mucous membranes moist  Neck: supple, no carotid bruits, thyroid not palpable  Lungs: Bilateral equal air entry, clear to ausculation, no wheezing, rales or rhonchi, normal effort  Cardiovascular: HR  normal rate, regular rhythm, no murmur, gallop, rub.   Abdomen: Soft, nontender, nondistended, normal bowel sounds  Neurologic: There are no new focal motor or sensory deficits, normal muscle tone and bulk, no abnormal sensation, normal speech, cranial nerves II through XII grossly intact  Skin: No gross lesions, rashes, bruising or bleeding on exposed skin area  Extremities:  peripheral pulses palpable, no pedal edema or calf pain with palpation  Psych: normal affect     Investigations:      Laboratory Testing:  Recent Results (from the past 24 hour(s))   POCT urine pregnancy    Collection Time: 02/08/21 10:29 AM   Result Value Ref Range    HCG, Pregnancy Urine (POC) NEGATIVE NEGATIVE       Recent Labs     02/08/21  1029   HCG NEGATIVE       No results for input(s): COVID19 in the last 720 hours. Imaging/Diagnostics:    Fluoro For Surgical Procedures    Result Date: 1/25/2021  Radiology exam is complete. No Radiologist dictation. Please follow up with ordering provider. Diagnosis:      1. CERVICAL RADICULITIS WITH S/P CERVICAL FUSION WITH RADICULOPATHY     Plans:     1.  CERVICAL EPIDURAL STEROID INJECTION C7-T/1      EBONI Tolliver CNP  2/8/2021  10:40 AM

## 2021-02-08 NOTE — OP NOTE
Operative Note      Patient: Pao Bull  YOB: 1985  MRN: [id number]    Date of Procedure: 2/8/2021    Pre-Op Diagnosis: DX CERVICAL RADICULITIS     FUSION OF SPINE, CERVICAL REGION      CERVICAL RADICULOPATHY    Post-Op Diagnosis: Same       Procedure(s):  CERVICAL EPIDURAL STEROID INJECTION C7-T1    Surgeon(s):  Wale Hendrickson MD    Assistant:   * No surgical staff found *    Anesthesia: IV Sedation    Estimated Blood Loss (mL): Minimal    Complications: None    Specimens:   * No specimens in log *    Implants:  * No implants in log *      Drains: * No LDAs found *    Findings: N/A    Detailed Description of Procedure:   Preoperative Diagnosis: Cervical disc herniation and radiculitis  Postoperative Diagnosis:  Cervical disc herniation and radiculitis    Procedure Performed:  Cervical epidural steroid injection under fluoroscopy guidance    BLOOD LOSS: NONE    Procedure: The Patient was seen in the preop area, chart was reviewed, informed consent was obtained. Patient was taken to procedure room and was placed in prone position. Vital signs were monitored through out the  Procedure. A time out was completed. The site was prepped and draped in sterile manner. The target point was marked at The interlaminar space at C7/T1 . Skin and deep tissues were anesthetized with 1 % lidocaine. A  19-gauge Tuohy epidural needlele was advanced  under fluoroscopy guidance in AP view. Epidural space was identified using ELVIS technique. A 19 G catheter was threaded up in epidural space for 2 levels. Position was confirmed in Lateral view. Then after negative aspiration contrast dye was injected with live fluoroscopy in AP views that showed  spread of the contrast in the epidural space  and no vascular runoff or intrathecal spread. Finally 5 ml of treatment solution containing 4 ml of PF NS and 1 ml of dexamethasone 10 mg / ml was injected.   The needle was removed and a Band-Aid was placed over the needle insertion site. The patient's vital signs remained stable and the patient tolerated the procedure well. Electronically signed by Joann Mcgowan MD on 2/8/2021 at 11:47 AM  SEDATION NOTE:    ASA CLASSIFICATION  2  MP   CLASSIFICATION  2    · Moderate intravenous conscious sedation was supervised by Dr. Lulu Prado  . The patient was independently monitored by a Registered Nurse assigned to the Procedure Room  . Monitoring included automated blood pressure, continuous EKG, Capnography and continuous pulse oximetry. . The detailed Conscious Record is permanently stored in the [place] [id number].      The following is the conscious sedation record;  Start Time:  1136  End times:  1147  Duration:  11 MINUTES  MEDS GIVEN 2 MG VERSED AND 50 MCG FENTANYL

## 2021-02-23 ENCOUNTER — OFFICE VISIT (OUTPATIENT)
Dept: PAIN MANAGEMENT | Age: 36
End: 2021-02-23
Payer: COMMERCIAL

## 2021-02-23 VITALS
HEART RATE: 82 BPM | TEMPERATURE: 96.7 F | HEIGHT: 64 IN | SYSTOLIC BLOOD PRESSURE: 117 MMHG | WEIGHT: 140 LBS | DIASTOLIC BLOOD PRESSURE: 84 MMHG | BODY MASS INDEX: 23.9 KG/M2 | OXYGEN SATURATION: 100 %

## 2021-02-23 DIAGNOSIS — M54.2 CHRONIC NECK PAIN: Primary | ICD-10-CM

## 2021-02-23 DIAGNOSIS — Z98.1 HISTORY OF FUSION OF CERVICAL SPINE: ICD-10-CM

## 2021-02-23 DIAGNOSIS — M54.12 CERVICAL RADICULOPATHY: ICD-10-CM

## 2021-02-23 DIAGNOSIS — G89.29 CHRONIC NECK PAIN: Primary | ICD-10-CM

## 2021-02-23 PROCEDURE — 99214 OFFICE O/P EST MOD 30 MIN: CPT | Performed by: ANESTHESIOLOGY

## 2021-02-23 RX ORDER — NORTRIPTYLINE HYDROCHLORIDE 25 MG/1
25 CAPSULE ORAL NIGHTLY
Qty: 30 CAPSULE | Refills: 3 | Status: SHIPPED | OUTPATIENT
Start: 2021-02-23 | End: 2022-07-25 | Stop reason: ALTCHOICE

## 2021-02-23 ASSESSMENT — ENCOUNTER SYMPTOMS: COUGH: 0

## 2021-02-23 NOTE — PROGRESS NOTES
The patient is a 29 y. o. Non-/non  female. Chief Complaint   Patient presents with    Neck Pain    Follow Up After Procedure        HPI    pleasant physically active 41-year-old female with history of chronic neck and right arm pain  Onset of symptom more than 2 years ago  Pain radiates down from the neck all the way to the fingers  Associated with right arm numbness  Pain is constant throbbing sharp shooting burning sensation aggravated with weather changes routine activities and interferes with quality of life  No changes in bladder or bowel control  No progressive arm weakness  No history of fever chills or weight loss    She was diagnosed with cervical disc herniation  Failed conservative measures with therapy  Had a cervical discectomy fusion  Continue to have severe neck and arm pain after surgery  Have follow-up MRI that showed no further surgical pathology with improved disc herniation  EMG showed cervical radiculopathy  Patient have tried nonopioid medication such as NSAIDs and tried therapy without any improvement  Pain is moderately disabling and affecting her quality of life  We did do cervical epidural injection  She reports no relief    S/P:CERVICAL EPIDURAL STEROID INJECTION C7-T1 1/25/2021, 02/08/2021         Outcome   Any improvement of activity? No   Any side effects (appetite,leg cramping,facial fleshing): no   Increase of pain:  Yes  Pain score Today:  7  % of pain relief: 0%  Pain diary (medial branch block): No            Past Medical History:   Diagnosis Date    Headache 2003    MIGRAINES    Hypertension 05/2019    ON RX    Psoriasis     BEHIND EARS      Past Surgical History:   Procedure Laterality Date    CERVICAL LAMINECTOMY N/A 6/24/2019    POSTERIOR CERVICAL FUSION C3-4  (GLOBUS, SSEP [facility]OKES CONF# [id number] - ANEL. ) performed by Kendra Rivera MD at [address]  2007, 2014    x2    CYST REMOVAL Left 2004    breast    NECK SURGERY  06/24/2019 POSTERIOR CERVICAL FUSION C3-4      PAIN MANAGEMENT PROCEDURE N/A 1/25/2021    CERVICAL EPIDURAL STEROID INJECTION C7-T1 performed by Trudy Lou MD at [place] [address] N/A 2/8/2021    CERVICAL EPIDURAL STEROID INJECTION C7-T1 performed by Trudy Lou MD at [address] [place] [address]  2014     Social History     Socioeconomic History    Marital status:      Spouse name: None    Number of children: None    Years of education: None    Highest education level: None   Occupational History    None   Social Needs    Financial resource strain: None    Food insecurity     Worry: None     Inability: None    Transportation needs     Medical: None     Non-medical: None   Tobacco Use    Smoking status: Never Smoker    Smokeless tobacco: Never Used   Substance and Sexual Activity    Alcohol use: Yes     Alcohol/week: 0.0 standard drinks     Comment: MIXED DRINKS 2 TO 3  A MONTH    Drug use: No    Sexual activity: Yes     Partners: Male   Lifestyle    Physical activity     Days per week: None     Minutes per session: None    Stress: None   Relationships    Social connections     Talks on phone: None     Gets together: None     Attends [employer] service: None     Active member of club or organization: None     Attends meetings of clubs or organizations: None     Relationship status: None    Intimate partner violence     Fear of current or ex partner: None     Emotionally abused: None     Physically abused: None     Forced sexual activity: None   Other Topics Concern    None   Social History Narrative    None     Family History   Problem Relation Age of Onset    High Blood Pressure Father     Migraines Sister     Migraines Paternal Grandmother     Heart Disease Paternal Grandmother     Cancer Paternal Grandfather         brain     No Known Allergies  Patient has no known allergies.    Vitals:    02/23/21 0942   BP: 117/84   Pulse: 82   Temp: 96.7 °F (35.9 °C)   SpO2: 100% provided  Will obtain psychological evaluation    She reported poor sleep and that is contributing to upper back spasm  I will start her on nortriptyline  If that fails then consider for trigger point injection    Orders Placed This Encounter   Procedures    External Referral To Psychology      Orders Placed This Encounter   Medications    nortriptyline (PAMELOR) 25 MG capsule     Sig: Take 1 capsule by mouth nightly     Dispense:  30 capsule     Refill:  3          Electronically signed by Patrick Rivera MD on 2/23/2021 at 10:38 AM

## 2021-02-26 DIAGNOSIS — M79.2 RADICULAR PAIN IN RIGHT ARM: ICD-10-CM

## 2021-02-26 DIAGNOSIS — M25.511 PAIN IN JOINT OF RIGHT SHOULDER: ICD-10-CM

## 2021-02-26 RX ORDER — MELOXICAM 7.5 MG/1
7.5 TABLET ORAL DAILY
Qty: 30 TABLET | Refills: 0 | Status: SHIPPED | OUTPATIENT
Start: 2021-02-26 | End: 2021-04-01

## 2021-02-26 NOTE — TELEPHONE ENCOUNTER
Pharmacy requesting refill of Meloxicam.      Medication active on med list yes      Date of last fill: 1/29/21  with 0 refills verified on 2/26/21  verified by FRANCK RN      Date of last appointment 12/22/21    Next Visit Date:  3/23/2021  She was referred to pain management and saw Dr. Rowena Doe on 2/23/21. Do you want to refill this?

## 2021-03-23 ENCOUNTER — OFFICE VISIT (OUTPATIENT)
Dept: NEUROLOGY | Age: 36
End: 2021-03-23
Payer: COMMERCIAL

## 2021-03-23 VITALS
BODY MASS INDEX: 24.07 KG/M2 | SYSTOLIC BLOOD PRESSURE: 137 MMHG | HEART RATE: 72 BPM | HEIGHT: 64 IN | TEMPERATURE: 97.7 F | DIASTOLIC BLOOD PRESSURE: 82 MMHG | WEIGHT: 141 LBS

## 2021-03-23 DIAGNOSIS — M79.2 RADICULAR PAIN IN RIGHT ARM: ICD-10-CM

## 2021-03-23 DIAGNOSIS — G44.221 CHRONIC TENSION-TYPE HEADACHE, INTRACTABLE: ICD-10-CM

## 2021-03-23 DIAGNOSIS — M25.511 PAIN IN JOINT OF RIGHT SHOULDER: ICD-10-CM

## 2021-03-23 DIAGNOSIS — R52 INTRACTABLE PAIN: ICD-10-CM

## 2021-03-23 DIAGNOSIS — G43.119 INTRACTABLE MIGRAINE WITH AURA WITHOUT STATUS MIGRAINOSUS: Primary | ICD-10-CM

## 2021-03-23 PROCEDURE — 99214 OFFICE O/P EST MOD 30 MIN: CPT | Performed by: PSYCHIATRY & NEUROLOGY

## 2021-03-23 RX ORDER — ONDANSETRON 4 MG/1
TABLET, FILM COATED ORAL
Qty: 40 TABLET | Refills: 3 | Status: SHIPPED | OUTPATIENT
Start: 2021-03-23 | End: 2021-10-19 | Stop reason: SDUPTHER

## 2021-03-23 RX ORDER — ACETAMINOPHEN AND CODEINE PHOSPHATE 300; 30 MG/1; MG/1
1 TABLET ORAL EVERY 4 HOURS PRN
Qty: 16 TABLET | Refills: 0 | Status: SHIPPED | OUTPATIENT
Start: 2021-03-23 | End: 2021-05-25 | Stop reason: SDUPTHER

## 2021-03-23 RX ORDER — RIMEGEPANT SULFATE 75 MG/75MG
TABLET, ORALLY DISINTEGRATING ORAL
Qty: 30 TABLET | Refills: 1 | Status: SHIPPED | OUTPATIENT
Start: 2021-03-23 | End: 2021-05-25 | Stop reason: SDUPTHER

## 2021-03-23 NOTE — PROGRESS NOTES
HEENT [] Hearing Loss  [] Visual Disturbance  [x] Tinnitus  [] Eye pain   Respiratory [] Shortness of Breath  [] Cough  [] Snoring   Cardiovascular [] Chest Pain  [] Palpitations  [] Lightheaded   GI [] Constipation  [] Diarrhea  [] Swallowing change  [] Nausea/vomiting    [] Urinary Frequency  [] Urinary Urgency   Musculoskeletal [x] Neck pain  [x] Back pain  [x] Muscle pain  [] Restless legs   Dermatologic [] Skin changes   Neurologic [] Memory loss/confusion  [] Seizures  [] Trouble walking or imbalance  [] Dizziness  [x] Sleep disturbance  [x] Weakness  [x] Numbness  [] Tremors  [] Speech Difficulty  [x] Headaches  [x] Light Sensitivity  [x] Sound Sensitivity   Endocrinology []Excessive thirst  []Excessive hunger   Psychiatric [x] Anxiety/Depression  [] Hallucination   Allergy/immunology []Hives/environmental allergies   Hematologic/lymph [] Abnormal bleeding  [] Abnormal bruising

## 2021-03-23 NOTE — PROGRESS NOTES
NEUROLOGY FOLLOW-UP  Patient Name:  Kathia Marie  :   1985  Clinic Visit Date: 3/23/2021  Last visit date: 10/23/2020      Dear Dr. Yana Manzanares MD     I saw Ms. Kathia Marie in follow-up in the office today in continuation of neurologic care. She  is a 29 y.o. right handed  female with intermittent migrainous attacks superimposed on chronic tension type headaches; on  tid + Tizanidine & Maxalt prn. She had cervical myelopathy s/p cervical spinal fusion on 2019 with significant improvement in tension type headaches. Today she comes to clinic stating that she has been having extremely intractable neck pain. She has tried epidural injections through the pain clinic without any significant relief. She has upcoming appointment for spinal cord stimulator implantation evaluation. With ongoing neck pain she also has been having frequent headaches and unable to sleep. Her neck pain has been radiating down right upper extremity extending to right hand digits, predominantly last 3 digits. She denied bladder dysfunction. She does have difficulty with fine motor control. She was unable to tolerate Medrol Dosepak/prednisone taper/any steroid formulations. She has been suffering from disabling tension type headaches along with ongoing neck pain. She has been taking muscle relaxants for muscle spasms with minimal relief. Of note; she has had posterior cervical fusion surgery for cervical radiculopathy on 2019. She had significant transient improvement in neck pain and headaches. Of note; she had hx of chronic daily headaches with \"headaches for at least 15 years since teenage years\". With superimposed intermittent migrainous attacks; has been on gabapentin 400 bid, tizanidine nightly for headache prophylaxis. She is also on Maxalt alternating with butalbital for rescue therapy. She is also on Aimovig 70mg once monthly injn preventive therapy.    She had suffered from neck pain with right upper extremity weakness prior to surgery in June 2019. She has had limitation of neck movement. She also admits to having intermittent pain radiating down right upper extremity. Denies bladder dysfunction but admits to having clumsiness with occasional gait disturbances with ongoing neck pain and right shoulder pain. She has tried NSAIDs with no relief. Her migraines occur with aura consisted of severe left parietotemporal throbbing pains associated with photophobia and phonophobia and nausea lasting several hours of the day. Her frequent headaches were described as a pressure like headaches with mild to moderate severity associated with the photophobia only and not associated with nausea and vomiting and able to function with many of these frequent tension type headaches. She also has had neck pain with the spasms and tizanidine has helped. Of note; she has family history of migraine headaches in her grandmother and paternal aunt and sister. She has 2 children, Guero (2011) and Terrie Tena (2006)    Medications trials: Failed Topamax, beta blockers and tricyclics (amitriptyline). Review of systems done and pertinent positives include neck pain, headaches, phonophobia, fatigue, right arm pain, dizziness with lightheadedness, gait difficulty and anxiety. Admits to anxiety and depression due to ongoing headaches and neck pain. Current Outpatient Medications on File Prior to Visit   Medication Sig Dispense Refill    meloxicam (MOBIC) 7.5 MG tablet Take 1 tablet by mouth daily.  30 tablet 0    nortriptyline (PAMELOR) 25 MG capsule Take 1 capsule by mouth nightly 30 capsule 3    DULoxetine (CYMBALTA) 60 MG extended release capsule Take 1 capsule by mouth daily 90 capsule 1    cloNIDine (CATAPRES) 0.1 MG tablet 1 tablet p.o. twice daily 180 tablet 0    tiZANidine (ZANAFLEX) 4 MG tablet Take one tab nightly and half tab in am as needed for spasms 135 tablet 1    gabapentin Cancer Paternal Grandfather         brain     On exam: vitals: Blood pressure 137/82, pulse 72, temperature 97.7 °F (36.5 °C), temperature source Temporal, height 5' 4\" (1.626 m), weight 141 lb (64 kg), not currently breastfeeding. GENERAL  Appears comfortable and in distress   HEENT  presently wearing cervical collar   NECK  Supple and no bruits heard   MENTAL STATUS:  Alert, oriented, intact memory, no confusion, normal speech, normal language, no hallucination or delusion; Appropriate affect. CRANIAL NERVES: II     -      PERRLA; VA 20/20 OU., Visual fields intact to confrontation  III,IV,VI -  EOMs full, no afferent defect, no  KEHINDE, no ptosis  V     -     Normal facial sensation  VII    -     Normal facial symmetry  VIII   -     Intact hearing  IX,X -     Symmetrical palate  XI    -     Symmetrical shoulder shrug  XII   -     Midline tongue, no atrophy    MOTOR FUNCTION:  significant for good strength of grade 5/5 in bilateral proximal and distal muscle groups of both upper and lower extremities with normal bulk, normal tone and no involuntary movements, no tremor; Spurling's sign equivocal. Parks's sign negative. SENSORY FUNCTION:  Normal touch, normal pin, normal vibration, normal proprioception   CEREBELLAR FUNCTION:  Intact fine motor control over upper limbs   REFLEX FUNCTION:  Symmetric and brisk DTRs with no parks sign; no Babinski sign   STATION and GAIT  Normal station, antalgic gait        Diagnostic data reviewed with the patient:   MRI Brain (w/wo) (1/31/17): No intracranial abnormality. Normal MRI of the brain.     CBC:     Lab Results   Component Value Date    WBC 6.7 08/11/2020    HGB 14.2 08/11/2020     08/11/2020      BMP:     Lab Results   Component Value Date     08/11/2020    K 4.5 08/11/2020    GLUCOSE 98 08/11/2020    CALCIUM 10.0 08/11/2020         Lab Results   Component Value Date    CHOL 199 08/11/2020    LDLCHOLESTEROL 100 08/11/2020    HDL 66 08/11/2020 TRIG 167 (H) 08/11/2020    ALT 11 08/11/2020    AST 17 08/11/2020    TSH 3.19 08/11/2020    LABA1C 4.8 08/11/2020     MRI cervical spine 1/18/19: Multiple levels with small disc protrusions in cervical spine without significant canal stenosis or neural foraminal narrowing. No demyelinating plaques; No abnl enhancement with IV contrast.    EMG Rt UE (1/24/19): There is electrophysiologic evidence of mild right C6/C7 cervical radiculopathy. This study also demonstrated subtle median neuropathy at right wrist suggestive of right carpal tunnel syndrome of mild severity. MRI Cervical Spine (w/wo) 11/5/2020: Status post posterior instrumented fusion of C3-4. Degenerative disc disease without spinal canal stenosis or foraminal stenosis. No abnormal signal in cervical spinal cord. No abnormal enhancement of the spinal cord. Impression and Plan: Ms. Charanjit Nunn is a 29 y.o. female with   Radicular pain in right upper extremity; s/p C3-4 cervical spine fusion(06/2019); for her ongoing radicular pain, was referred to pain clinic and has had steroid injns in Jan and Feb with no relief; she is on schedule for spinal cord stimulator in next couple of weeks. She has been on Meloxicam and tizanidine with mild relief. She is requesting for tylenol #3 for breakthrough pain while waiting for spinal cord stimulator for this \"horrible and unbearable neck pain and radicular pain\". She very well understood about addiction potential of Tylenol #3. Prescription drug monitoring report reviewed. Patient is not receiving prescriptions for multiple prescribers. No signs of potential drug abuse or diversion identified. Patient clearly understood that these medications can cause CNS depression impairing physical and mental abilities.   Patient clearly understood that the Tylenol 3 medication can cause CNS depression, drug dependency, psychological dependency and physical dependency, etc.    Intractable headaches with cervical spine dysfunction superimposed on migrainous attacks:will increase the dose of gabapentin from 300 mg tid to 400 tid; continue Tizanidine prn, rizatriptan prn migrainous attacks. Continue Emgality 120 mg once monthly. She will keep log of headaches. Maxalt \"not helping\". ,  will try Rimegepant, Nurtec and also continue zofran prn     Discussion done about polypharmacy. She will use pain medications for short-term basis only until she gets appointment with the pain clinic. Follow-up in 3 months. Please note that portions of this note were completed with a voice recognition program.  Although every effort was made to ensure the accuracy of this automated transcription, some errors in transcription may have occurred; occasionally words are mis-transcribed. Thank you for understanding.

## 2021-03-26 RX ORDER — GABAPENTIN 400 MG/1
CAPSULE ORAL
Qty: 270 CAPSULE | Refills: 1 | Status: SHIPPED
Start: 2021-03-26 | End: 2021-05-25 | Stop reason: SDUPTHER

## 2021-03-29 RX ORDER — CLONIDINE HYDROCHLORIDE 0.1 MG/1
TABLET ORAL
Qty: 180 TABLET | Refills: 1 | Status: SHIPPED | OUTPATIENT
Start: 2021-03-29 | End: 2021-09-22

## 2021-03-29 NOTE — TELEPHONE ENCOUNTER
Shahnaz Mathews is calling to request a refill on the following medication(s):    Medication Request:  Requested Prescriptions     Pending Prescriptions Disp Refills    cloNIDine (CATAPRES) 0.1 MG tablet 180 tablet 0     Si tablet p.o. twice daily     Last filled 20 90 day no refill  Order pending    Last Visit Date (If Applicable):  1552    Next Visit Date:    Visit date not found

## 2021-03-31 DIAGNOSIS — M79.2 RADICULAR PAIN IN RIGHT ARM: ICD-10-CM

## 2021-03-31 DIAGNOSIS — M25.511 PAIN IN JOINT OF RIGHT SHOULDER: ICD-10-CM

## 2021-03-31 NOTE — TELEPHONE ENCOUNTER
Pharmacy requesting refill of Mobic 7.5 mg.      Medication active on med list yes      Date last ordered: 2/26/2021  verified on 3/31/2021  verified by ZULEMA Santiago LPN      Date of last appointment 3/23/2021    Next Visit Date:  5/25/2021

## 2021-04-01 RX ORDER — MELOXICAM 7.5 MG/1
7.5 TABLET ORAL DAILY
Qty: 30 TABLET | Refills: 0 | Status: SHIPPED | OUTPATIENT
Start: 2021-04-01 | End: 2021-04-26

## 2021-04-01 RX ORDER — CLONIDINE HYDROCHLORIDE 0.1 MG/1
TABLET ORAL
Qty: 180 TABLET | Refills: 1 | OUTPATIENT
Start: 2021-04-01

## 2021-04-01 NOTE — TELEPHONE ENCOUNTER
Chris Coast is calling to request a refill on the following medication(s):    Medication Request:  Requested Prescriptions     Pending Prescriptions Disp Refills    cloNIDine (CATAPRES) 0.1 MG tablet [Pharmacy Med Name: Clonidine Hydrochloride 0.1mg Tablet] 180 tablet 1     Sig: Take 1 tablet by mouth twice daily.      Last filled 3/29/21, not due    Last Visit Date (If Applicable):  3/3/4323    Next Visit Date:    Visit date not found

## 2021-04-24 DIAGNOSIS — M79.2 RADICULAR PAIN IN RIGHT ARM: ICD-10-CM

## 2021-04-24 DIAGNOSIS — M25.511 PAIN IN JOINT OF RIGHT SHOULDER: ICD-10-CM

## 2021-04-26 ENCOUNTER — TELEPHONE (OUTPATIENT)
Dept: PAIN MANAGEMENT | Age: 36
End: 2021-04-26

## 2021-04-26 NOTE — TELEPHONE ENCOUNTER
Pharmacy requesting refill of Mobic.       Medication active on med list: yes      Date of last fill: 4/1/2021 #30 NR  verified on 4/26/2021    verified by Andrzej Diaz LPN      Date of last appointment 3/23/2021    Next Visit Date:  5/25/2021

## 2021-04-27 RX ORDER — MELOXICAM 7.5 MG/1
7.5 TABLET ORAL DAILY
Qty: 30 TABLET | Refills: 0 | Status: SHIPPED | OUTPATIENT
Start: 2021-04-27 | End: 2021-05-24

## 2021-05-03 ENCOUNTER — TELEPHONE (OUTPATIENT)
Dept: PAIN MANAGEMENT | Age: 36
End: 2021-05-03

## 2021-05-03 NOTE — TELEPHONE ENCOUNTER
Patient called to see when she could make her SCS trial appt but she was told that we have not heard from the insurance company

## 2021-05-12 ENCOUNTER — TELEPHONE (OUTPATIENT)
Dept: PAIN MANAGEMENT | Age: 36
End: 2021-05-12

## 2021-05-12 DIAGNOSIS — G44.221 CHRONIC TENSION-TYPE HEADACHE, INTRACTABLE: ICD-10-CM

## 2021-05-12 DIAGNOSIS — M62.838 MUSCLE SPASM: ICD-10-CM

## 2021-05-12 RX ORDER — TIZANIDINE 4 MG/1
TABLET ORAL
Qty: 135 TABLET | Refills: 1 | Status: CANCELLED | OUTPATIENT
Start: 2021-05-12

## 2021-05-12 NOTE — LETTER
[facility] [address]  [address] [place] [id number]  Phone: [phone]  Fax: [phone]    Alecia Watson MD      May 12, 2021    Hannah Elizabethut  1985  [facility] UL:[id number]   Case #:[id number]    Requesting expedited appeal for denied CPT 63650 x 2     We are witting in response to the denial letter. We requested prior authorization for spinal cord stimulation trial for our patient. Indications for procedure is explained in my letter attached to this denial appeal letter. Reason cited in denial letter states that there is no evidence that the dorsal spinal cord stimulator is effective for the member's condition of cervical disc herniation, failed cervical spine surgery syndrome presenting with arm, neck pain. This is an incorrect assessment, I disagree and I challenge it. Numerous studies show long term benefits. Even cost effectiveness studies have been done. I am attaching one study and a list of studies for your educational purpose. This is established treatment for patients who have persistent neck and arm pain after cervical spine surgery. Our patient meets all criteria and have exhausted all of the modalities. We request you to overturn the denial and approve the procedure. Please feel free to contact me anytime.      Requesting reconsideration and approval for denied CPT 63650 x 2   Dx: M96.1 cervical postlaminectomy syndrome,  M43.22 Fusion of cervical spine , M54.12 cervical radiculopathy      Sincerely,           Alecia Watson MD

## 2021-05-12 NOTE — TELEPHONE ENCOUNTER
Attempted to schedule peer to peer for denied SCS trial. Writer was transferred back and forth in between [place] and [facility]ino and spoke to Son, Karin Holcomb, and Silva and was informed that the member has to call member services to request appeal. Ha Love was not able to schedule peer to peer.  Only appeal can be submitted at this time and can be faxed to [phone]

## 2021-05-24 DIAGNOSIS — M25.511 PAIN IN JOINT OF RIGHT SHOULDER: ICD-10-CM

## 2021-05-24 DIAGNOSIS — M79.2 RADICULAR PAIN IN RIGHT ARM: ICD-10-CM

## 2021-05-24 RX ORDER — MELOXICAM 7.5 MG/1
7.5 TABLET ORAL DAILY
Qty: 30 TABLET | Refills: 0 | Status: SHIPPED | OUTPATIENT
Start: 2021-05-24 | End: 2021-05-25 | Stop reason: ALTCHOICE

## 2021-05-24 NOTE — TELEPHONE ENCOUNTER
Pharmacy requesting a  refill of Mobic 75mg.       Medication active on med list yes      Date of last prescription 04/27/202121  with 0 refills verified on 05/24/2021    verified by RAYMUNDO CROCKETT      Date of last appointment 03/23/2021    Next Visit Date:  5/25/2021

## 2021-05-25 ENCOUNTER — OFFICE VISIT (OUTPATIENT)
Dept: NEUROLOGY | Age: 36
End: 2021-05-25
Payer: COMMERCIAL

## 2021-05-25 VITALS
WEIGHT: 130 LBS | BODY MASS INDEX: 22.2 KG/M2 | SYSTOLIC BLOOD PRESSURE: 138 MMHG | HEART RATE: 64 BPM | DIASTOLIC BLOOD PRESSURE: 88 MMHG | HEIGHT: 64 IN

## 2021-05-25 DIAGNOSIS — M54.2 CHRONIC NECK PAIN: ICD-10-CM

## 2021-05-25 DIAGNOSIS — M79.601 PAIN OF RIGHT UPPER EXTREMITY: ICD-10-CM

## 2021-05-25 DIAGNOSIS — M25.511 PAIN IN JOINT OF RIGHT SHOULDER: ICD-10-CM

## 2021-05-25 DIAGNOSIS — M79.2 RADICULAR PAIN IN RIGHT ARM: ICD-10-CM

## 2021-05-25 DIAGNOSIS — G43.119 INTRACTABLE MIGRAINE WITH AURA WITHOUT STATUS MIGRAINOSUS: ICD-10-CM

## 2021-05-25 DIAGNOSIS — G89.29 CHRONIC NECK PAIN: ICD-10-CM

## 2021-05-25 DIAGNOSIS — R52 INTRACTABLE PAIN: Primary | ICD-10-CM

## 2021-05-25 PROCEDURE — 99214 OFFICE O/P EST MOD 30 MIN: CPT | Performed by: PSYCHIATRY & NEUROLOGY

## 2021-05-25 RX ORDER — GABAPENTIN 400 MG/1
400 CAPSULE ORAL 3 TIMES DAILY
Qty: 270 CAPSULE | Refills: 1 | Status: SHIPPED | OUTPATIENT
Start: 2021-05-25 | End: 2021-10-19 | Stop reason: DRUGHIGH

## 2021-05-25 RX ORDER — RIMEGEPANT SULFATE 75 MG/75MG
TABLET, ORALLY DISINTEGRATING ORAL
Qty: 30 TABLET | Refills: 1 | Status: SHIPPED | OUTPATIENT
Start: 2021-05-25 | End: 2021-10-19 | Stop reason: SDUPTHER

## 2021-05-25 RX ORDER — ACETAMINOPHEN AND CODEINE PHOSPHATE 300; 30 MG/1; MG/1
1 TABLET ORAL EVERY 4 HOURS PRN
Qty: 10 TABLET | Refills: 0 | Status: SHIPPED | OUTPATIENT
Start: 2021-05-25 | End: 2021-05-28

## 2021-05-25 NOTE — PROGRESS NOTES
NEUROLOGY FOLLOW-UP  Patient Name:  Ankush Whitten  :   1985  Clinic Visit Date: 2021  Last visit date: 3/23/21      Dear Dr. Courtney Quiñones MD     I saw Ms. Ankush Whitten in follow-up in the office today in continuation of neurologic care. She  is a 29 y.o. right handed  female with intermittent migrainous attacks superimposed on chronic tension type headaches; on  tid + Tizanidine & Maxalt prn. She had cervical myelopathy s/p cervical spinal fusion on 2019 with significant improvement in tension type headaches. Today she comes to the clinic stating \"meloxicam is not helping that much\". She has been having more pain at base of the neck and also has electric shock like sensations upon bending the neck forwards. She has had epidural steroid injn rx on  with aggravation of the pain since then. She has neck pain and shoulder pain on right side radiating to Rt hand last 3 digits and also hurts on right torso and right leg and right foot. Admits to having loss of fine motor control in hands and clumsiness in Rt hand. Denied gait difficulties. Headaches are little better with increased dose of Gabapentin along with Nurtec prn \"helping better than Maxalt\". On a rare occasion, takes Butalbital. She needs Gabapentin 400 mg tid. Of note; she has had posterior cervical fusion surgery for cervical radiculopathy on 2019. She had significant transient improvement in neck pain and headaches. Of note; she had hx of chronic daily headaches with \"headaches for at least 15 years since teenage years\". With superimposed intermittent migrainous attacks; has been on gabapentin 400 bid, tizanidine nightly for headache prophylaxis. She is also on Maxalt alternating with butalbital for rescue therapy. She is also on Aimovig 70mg once monthly injn preventive therapy. She had suffered from neck pain with right upper extremity weakness prior to surgery in 2019.    She has had limitation of neck movement. She also admits to having intermittent pain radiating down right upper extremity. Denies bladder dysfunction but admits to having clumsiness with occasional gait disturbances with ongoing neck pain and right shoulder pain. She has tried NSAIDs with no relief. Her migraines occur with aura consisted of severe left parietotemporal throbbing pains associated with photophobia and phonophobia and nausea lasting several hours of the day. Her frequent headaches were described as a pressure like headaches with mild to moderate severity associated with the photophobia only and not associated with nausea and vomiting and able to function with many of these frequent tension type headaches. She also has had neck pain with the spasms and tizanidine has helped. Of note; she has family history of migraine headaches in her grandmother and paternal aunt and sister. She has 2 children, Guero (2011) and Chasity Alexis (2006)    Medications trials: Failed Topamax, beta blockers and tricyclics (amitriptyline). Review of systems done and pertinent positives include neck pain, headaches, phonophobia, fatigue, right arm pain, dizziness with lightheadedness, gait difficulty and anxiety. Admits to anxiety and depression due to ongoing headaches and neck pain. Current Outpatient Medications on File Prior to Visit   Medication Sig Dispense Refill    meloxicam (MOBIC) 7.5 MG tablet Take 1 tablet by mouth daily.  30 tablet 0    cloNIDine (CATAPRES) 0.1 MG tablet 1 tablet p.o. twice daily 180 tablet 1    gabapentin (NEURONTIN) 400 MG capsule TAKE ONE CAP three times daily at 8 am, 2 pm and 8 pm 270 capsule 1    Rimegepant Sulfate (NURTEC) 75 MG TBDP Take one tab daily 30 tablet 1    ondansetron (ZOFRAN) 4 MG tablet Take one tab every 12 hr as needed for nausea/ vomiting 40 tablet 3    nortriptyline (PAMELOR) 25 MG capsule Take 1 capsule by mouth nightly 30 capsule 3    DULoxetine (CYMBALTA) Sister     Migraines Paternal Grandmother     Heart Disease Paternal Grandmother     Cancer Paternal Grandfather         brain     On exam: vitals: Blood pressure 138/88, pulse 64, height 5' 4\" (1.626 m), weight 130 lb (59 kg), not currently breastfeeding. GENERAL  Appears uncomfortable and in distress with ongoing neck pain   HEENT  presently wearing cervical collar   NECK  Supple and no bruits heard   MENTAL STATUS:  Alert, oriented, intact memory, no confusion, normal speech, normal language, no hallucination or delusion; Appropriate affect. CRANIAL NERVES: II     -      PERRLA; VA 20/20 OU., Visual fields intact to confrontation  III,IV,VI -  EOMs full, no afferent defect, no  KEHINDE, no ptosis  V     -     Normal facial sensation  VII    -     Normal facial symmetry  VIII   -     Intact hearing  IX,X -     Symmetrical palate  XI    -     Symmetrical shoulder shrug  XII   -     Midline tongue, no atrophy    MOTOR FUNCTION:  significant for good strength of grade 5/5 in bilateral proximal and distal muscle groups of both upper and lower extremities with normal bulk, normal tone and no involuntary movements, no tremor; Spurling's sign equivocal. Parks's sign negative. SENSORY FUNCTION:  Normal touch, normal pin, normal vibration, normal proprioception   CEREBELLAR FUNCTION:  Intact fine motor control over upper limbs   REFLEX FUNCTION:  Symmetric and brisk DTRs with no parks sign; no Babinski sign   STATION and GAIT  Normal station, antalgic gait        Diagnostic data reviewed with the patient:   MRI Brain (w/wo) (1/31/17): No intracranial abnormality. Normal MRI of the brain.     CBC:     Lab Results   Component Value Date    WBC 6.7 08/11/2020    HGB 14.2 08/11/2020     08/11/2020      BMP:     Lab Results   Component Value Date     08/11/2020    K 4.5 08/11/2020    GLUCOSE 98 08/11/2020    CALCIUM 10.0 08/11/2020         Lab Results   Component Value Date    CHOL 199 08/11/2020 LDLCHOLESTEROL 100 08/11/2020    HDL 66 08/11/2020    TRIG 167 (H) 08/11/2020    ALT 11 08/11/2020    AST 17 08/11/2020    TSH 3.19 08/11/2020    LABA1C 4.8 08/11/2020     MRI cervical spine 1/18/19: Multiple levels with small disc protrusions in cervical spine without significant canal stenosis or neural foraminal narrowing. No demyelinating plaques; No abnl enhancement with IV contrast.    EMG Rt UE (1/24/19): There is electrophysiologic evidence of mild right C6/C7 cervical radiculopathy. This study also demonstrated subtle median neuropathy at right wrist suggestive of right carpal tunnel syndrome of mild severity. MRI Cervical Spine (w/wo) 11/5/2020: Status post posterior instrumented fusion of C3-4. Degenerative disc disease without spinal canal stenosis or foraminal stenosis. No abnormal signal in cervical spinal cord. No abnormal enhancement of the spinal cord. Impression and Plan: Ms. Trevor Kapadia is a 29 y.o. female with   Intractable neck pain  Intractable radicular pain in right upper extremity; s/p C3-C4 cervical spine fusion (06/2019) under pain clinic management and waiting for spinal cord stimulator implantation  Intractable headaches secondary to above  We will optimize the dose of gabapentin to 400 tid and also to continue Rimegepant, zofran prn. Request for Tylenol #3 for breakthrough pain while waiting for pain clinic mgt: She very well understood about addiction potential of Tylenol #3. Prescription drug monitoring report reviewed. Patient is not receiving prescriptions for multiple prescribers. No signs of potential drug abuse or diversion identified. Patient clearly understood that these medications can cause CNS depression impairing physical and mental abilities. Patient clearly understood that the Tylenol 3 medication can cause CNS depression, drug dependency, psychological dependency and physical dependency, etc.    Follow up in 2-3 months.      Please note that portions of this note were completed with a voice recognition program.  Although every effort was made to ensure the accuracy of this automated transcription, some errors in transcription may have occurred; occasionally words are mis-transcribed. Thank you for understanding.

## 2021-05-28 ENCOUNTER — TELEPHONE (OUTPATIENT)
Dept: PAIN MANAGEMENT | Age: 36
End: 2021-05-28

## 2021-06-15 NOTE — TELEPHONE ENCOUNTER
Received letter from [employer] that request for expedited appeal will not be processed as expedited because pt condition is not an emergency. Request for appeal downgraded to standard and can take up to 30 days for them to make a decision if denial will be overturned.

## 2021-07-02 ENCOUNTER — TELEPHONE (OUTPATIENT)
Dept: PAIN MANAGEMENT | Age: 36
End: 2021-07-02

## 2021-07-02 NOTE — TELEPHONE ENCOUNTER
Writer spoke with several different agents with [employer] and was given several different answers. Writer unable to determine if appeal has been overturned and approved or denied still. Last notification received by [employer] was via fax on 5/28/21 acknowledging receipt of expedited appeal request and that request was downgraded from expedited to standard and a decision would be made in 30 days. Asked to speak with Supervisor and was then transferred to survey line instead. Written request  faxed to [facility] department @ [phone] requesting update on appeal status.

## 2021-07-16 ENCOUNTER — TELEPHONE (OUTPATIENT)
Dept: PAIN MANAGEMENT | Age: 36
End: 2021-07-16

## 2021-07-28 RX ORDER — DULOXETIN HYDROCHLORIDE 60 MG/1
60 CAPSULE, DELAYED RELEASE ORAL DAILY
Qty: 30 CAPSULE | Refills: 0 | Status: SHIPPED | OUTPATIENT
Start: 2021-07-28 | End: 2021-09-16 | Stop reason: SDUPTHER

## 2021-07-28 NOTE — TELEPHONE ENCOUNTER
Claire Parish is calling to request a refill on the following medication(s):    Medication Request:  Requested Prescriptions     Pending Prescriptions Disp Refills    DULoxetine (CYMBALTA) 60 MG extended release capsule [Pharmacy Med Name: Duloxetine 60mg Delayed-Release Capsule] 90 capsule 0     Sig: Take 1 capsule by mouth daily.      Last filled 2/8/21 90 day 1 refill, will be due 8/8/21, order pending 30 day no refill, note for chema    Last Visit Date (If Applicable):  7/5/2978    Next Visit Date:    Visit date not found

## 2021-08-23 RX ORDER — DULOXETIN HYDROCHLORIDE 60 MG/1
CAPSULE, DELAYED RELEASE ORAL
Qty: 30 CAPSULE | Refills: 0 | OUTPATIENT
Start: 2021-08-23

## 2021-08-23 NOTE — TELEPHONE ENCOUNTER
Stephanie Gamble is calling to request a refill on the following medication(s):    Medication Request:  Requested Prescriptions     Refused Prescriptions Disp Refills    DULoxetine (CYMBALTA) 60 MG extended release capsule [Pharmacy Med Name: Duloxetine 60mg Delayed-Release Capsule] 30 capsule 0     Sig: Take 1 capsule by mouth daily. **Need appointment. **     Refused By: Ha Sorenson     Reason for Refusal: Patient needs an appointment       Last Visit Date (If Applicable):  6/7/1388    Next Visit Date:    Visit date not found

## 2021-08-24 ENCOUNTER — TELEPHONE (OUTPATIENT)
Dept: NEUROLOGY | Age: 36
End: 2021-08-24

## 2021-09-16 ENCOUNTER — OFFICE VISIT (OUTPATIENT)
Dept: INTERNAL MEDICINE CLINIC | Age: 36
End: 2021-09-16
Payer: COMMERCIAL

## 2021-09-16 ENCOUNTER — TELEPHONE (OUTPATIENT)
Dept: NEUROLOGY | Age: 36
End: 2021-09-16

## 2021-09-16 VITALS
DIASTOLIC BLOOD PRESSURE: 70 MMHG | HEIGHT: 64 IN | RESPIRATION RATE: 16 BRPM | SYSTOLIC BLOOD PRESSURE: 128 MMHG | TEMPERATURE: 97.5 F | HEART RATE: 75 BPM | WEIGHT: 114 LBS | BODY MASS INDEX: 19.46 KG/M2 | OXYGEN SATURATION: 100 %

## 2021-09-16 DIAGNOSIS — G44.221 CHRONIC TENSION-TYPE HEADACHE, INTRACTABLE: ICD-10-CM

## 2021-09-16 DIAGNOSIS — G43.719 INTRACTABLE CHRONIC MIGRAINE WITHOUT AURA AND WITHOUT STATUS MIGRAINOSUS: ICD-10-CM

## 2021-09-16 DIAGNOSIS — M54.12 CERVICAL RADICULOPATHY: Primary | ICD-10-CM

## 2021-09-16 DIAGNOSIS — J31.0 CHRONIC RHINITIS: ICD-10-CM

## 2021-09-16 DIAGNOSIS — F32.A ANXIETY AND DEPRESSION: ICD-10-CM

## 2021-09-16 DIAGNOSIS — M96.1 FAILED BACK SYNDROME OF CERVICAL SPINE: ICD-10-CM

## 2021-09-16 DIAGNOSIS — F41.9 ANXIETY AND DEPRESSION: ICD-10-CM

## 2021-09-16 DIAGNOSIS — Z98.1 HISTORY OF FUSION OF CERVICAL SPINE: ICD-10-CM

## 2021-09-16 DIAGNOSIS — M62.838 MUSCLE SPASM: ICD-10-CM

## 2021-09-16 DIAGNOSIS — I10 ESSENTIAL HYPERTENSION: ICD-10-CM

## 2021-09-16 PROBLEM — G89.29 CHRONIC NECK PAIN: Status: RESOLVED | Noted: 2021-02-23 | Resolved: 2021-09-16

## 2021-09-16 PROBLEM — M54.2 CHRONIC NECK PAIN: Status: RESOLVED | Noted: 2021-02-23 | Resolved: 2021-09-16

## 2021-09-16 PROCEDURE — 99214 OFFICE O/P EST MOD 30 MIN: CPT | Performed by: FAMILY MEDICINE

## 2021-09-16 RX ORDER — DULOXETIN HYDROCHLORIDE 60 MG/1
60 CAPSULE, DELAYED RELEASE ORAL DAILY
Qty: 90 CAPSULE | Refills: 1 | Status: SHIPPED | OUTPATIENT
Start: 2021-09-16 | End: 2022-07-25 | Stop reason: ALTCHOICE

## 2021-09-16 SDOH — ECONOMIC STABILITY: FOOD INSECURITY: WITHIN THE PAST 12 MONTHS, YOU WORRIED THAT YOUR FOOD WOULD RUN OUT BEFORE YOU GOT MONEY TO BUY MORE.: NEVER TRUE

## 2021-09-16 SDOH — ECONOMIC STABILITY: FOOD INSECURITY: WITHIN THE PAST 12 MONTHS, THE FOOD YOU BOUGHT JUST DIDN'T LAST AND YOU DIDN'T HAVE MONEY TO GET MORE.: NEVER TRUE

## 2021-09-16 ASSESSMENT — SOCIAL DETERMINANTS OF HEALTH (SDOH): HOW HARD IS IT FOR YOU TO PAY FOR THE VERY BASICS LIKE FOOD, HOUSING, MEDICAL CARE, AND HEATING?: NOT HARD AT ALL

## 2021-09-16 ASSESSMENT — ENCOUNTER SYMPTOMS
RESPIRATORY NEGATIVE: 1
ALLERGIC/IMMUNOLOGIC NEGATIVE: 1
GASTROINTESTINAL NEGATIVE: 1
EYES NEGATIVE: 1

## 2021-09-16 NOTE — PROGRESS NOTES
Subjective:      Patient ID: Giulia Horne is a 29 y.o. female. Neck Pain   This is a chronic problem. The current episode started more than 1 month ago. The problem occurs constantly. The problem has been unchanged. The pain is present in the midline. The quality of the pain is described as cramping and shooting. The pain is at a severity of 6/10. The pain is severe. The symptoms are aggravated by stress, position and twisting. The pain is same all the time. Stiffness is present all day. Associated symptoms include weakness. Treatments tried: She is established with spine surgery, pain management, neurology. The treatment provided mild relief. Review of Systems   Constitutional: Negative. HENT: Negative. Eyes: Negative. Respiratory: Negative. Cardiovascular: Negative. Gastrointestinal: Negative. Endocrine: Negative. Musculoskeletal: Positive for arthralgias, myalgias and neck pain. Skin: Negative. Allergic/Immunologic: Negative. Neurological: Positive for weakness. Hematological: Negative. Psychiatric/Behavioral: Positive for dysphoric mood. The patient is nervous/anxious. Past family and social history unremarkable. Diagnosis Orders   1. Cervical radiculopathy     2. Chronic rhinitis     3. Intractable chronic migraine without aura and without status migrainosus  CBC    Comprehensive Metabolic Panel    Hemoglobin A1C    Lipid Panel    TSH without Reflex   4. Muscle spasm  CBC    Comprehensive Metabolic Panel    Hemoglobin A1C    Lipid Panel    TSH without Reflex   5. Chronic tension-type headache, intractable     6. Essential hypertension  CBC    Comprehensive Metabolic Panel    Hemoglobin A1C    Lipid Panel    TSH without Reflex   7. History of fusion of cervical spine     8. Anxiety and depression     9. Failed back syndrome of cervical spine         Objective:   Physical Exam  Vitals and nursing note reviewed.    Constitutional:       Appearance: She is well-developed. HENT:      Head: Normocephalic and atraumatic. Right Ear: External ear normal.      Left Ear: External ear normal.   Eyes:      Conjunctiva/sclera: Conjunctivae normal.      Pupils: Pupils are equal, round, and reactive to light. Cardiovascular:      Rate and Rhythm: Normal rate and regular rhythm. Heart sounds: Normal heart sounds. Comments: Hypertension  Pulmonary:      Effort: Pulmonary effort is normal.      Breath sounds: Normal breath sounds. Abdominal:      General: Bowel sounds are normal.      Palpations: Abdomen is soft. Genitourinary:     Vagina: Normal.   Musculoskeletal:         General: Normal range of motion. Cervical back: Normal range of motion and neck supple. Comments: Failed cervical decompression syndrome   Skin:     General: Skin is warm and dry. Neurological:      Mental Status: She is alert and oriented to person, place, and time. Deep Tendon Reflexes: Reflexes are normal and symmetric. Comments: Migraine without aura   Psychiatric:      Comments: Anxiety/depression with underlying chronic pain syndrome         Assessment:       Diagnosis Orders   1. Cervical radiculopathy     2. Chronic rhinitis     3. Intractable chronic migraine without aura and without status migrainosus  CBC    Comprehensive Metabolic Panel    Hemoglobin A1C    Lipid Panel    TSH without Reflex   4. Muscle spasm  CBC    Comprehensive Metabolic Panel    Hemoglobin A1C    Lipid Panel    TSH without Reflex   5. Chronic tension-type headache, intractable     6. Essential hypertension  CBC    Comprehensive Metabolic Panel    Hemoglobin A1C    Lipid Panel    TSH without Reflex   7. History of fusion of cervical spine     8. Anxiety and depression     9. Failed back syndrome of cervical spine             Plan:      56-year-old very anxious female returns for follow-up. Known history of chronic neck pain with history of posterior decompression with ongoing symptoms. No apparent sign of myelopathy. Patient states that since epidural injection with steroid her symptoms got worse. She is established with spine surgery, pain management and neurology. Currently she is on Cymbalta, clonidine, gabapentin, muscle relaxants. She is tearful and feels that she continues to have neck pain that interferes with her life. Reassurance provided. [facility] for further review. In the meantime, continue to follow-up with consults in [place]  Hypertension well controlled. She would benefit from stress reduction and pain control. Continue clonidine  Paraspinal muscle spasm. Continue muscle relaxant, gabapentin, stress reduction is advised. Moist heat application. Continue range of motion exercises as tolerated. Improve sleep hygiene  Anxiety/depression on Cymbalta. She was seen by psych however she does not wish to continue follow-up. She denies suicidality, delusion or hallucination  Allergies allergic rhinitis. Continue nasal saline, over-the-counter antihistamine  History of migraine without aura. She is established with neurology on Cymbalta, gabapentin, Emgality, Nurtec, Zofran, Pamelor, Maxalt, Fioricet  She denies tobacco, excessive alcohol or illicit drug use  Further recommendations to follow labs  This note is created with a voice recognition program and while intend to generate a document that accurately reflects the content of the visit, no guarantee can be provided that every mistake has been identified and corrected by editing.           Suni Caballero MD

## 2021-09-22 RX ORDER — CLONIDINE HYDROCHLORIDE 0.1 MG/1
TABLET ORAL
Qty: 180 TABLET | Refills: 1 | Status: SHIPPED | OUTPATIENT
Start: 2021-09-22 | End: 2022-03-28

## 2021-09-22 NOTE — TELEPHONE ENCOUNTER
Spenser Lund is calling to request a refill on the following medication(s):    Medication Request:  Requested Prescriptions     Pending Prescriptions Disp Refills    cloNIDine (CATAPRES) 0.1 MG tablet [Pharmacy Med Name: Clonidine Hydrochloride 0.1mg Tablet] 180 tablet 0     Sig: Take 1 tablet by mouth twice daily.      Last filled 3/29/21 90 day 1 refill  Order sent    Last Visit Date (If Applicable):  9/17/4189    Next Visit Date:    3/11/2022

## 2021-10-19 ENCOUNTER — OFFICE VISIT (OUTPATIENT)
Dept: NEUROLOGY | Age: 36
End: 2021-10-19
Payer: COMMERCIAL

## 2021-10-19 VITALS
HEART RATE: 101 BPM | DIASTOLIC BLOOD PRESSURE: 93 MMHG | BODY MASS INDEX: 19.46 KG/M2 | WEIGHT: 114 LBS | HEIGHT: 64 IN | SYSTOLIC BLOOD PRESSURE: 123 MMHG

## 2021-10-19 DIAGNOSIS — R52 INTRACTABLE PAIN: ICD-10-CM

## 2021-10-19 DIAGNOSIS — G89.29 CHRONIC NECK PAIN: ICD-10-CM

## 2021-10-19 DIAGNOSIS — M54.2 CHRONIC NECK PAIN: ICD-10-CM

## 2021-10-19 DIAGNOSIS — G44.221 CHRONIC TENSION-TYPE HEADACHE, INTRACTABLE: Primary | ICD-10-CM

## 2021-10-19 DIAGNOSIS — G43.119 INTRACTABLE MIGRAINE WITH AURA WITHOUT STATUS MIGRAINOSUS: ICD-10-CM

## 2021-10-19 DIAGNOSIS — M62.838 MUSCLE SPASM: ICD-10-CM

## 2021-10-19 PROCEDURE — 99214 OFFICE O/P EST MOD 30 MIN: CPT | Performed by: PSYCHIATRY & NEUROLOGY

## 2021-10-19 RX ORDER — RIMEGEPANT SULFATE 75 MG/75MG
TABLET, ORALLY DISINTEGRATING ORAL
Qty: 15 TABLET | Refills: 3 | Status: SHIPPED | OUTPATIENT
Start: 2021-10-19 | End: 2022-01-17 | Stop reason: SDUPTHER

## 2021-10-19 RX ORDER — TIZANIDINE 4 MG/1
TABLET ORAL
Qty: 135 TABLET | Refills: 1 | Status: SHIPPED | OUTPATIENT
Start: 2021-10-19 | End: 2022-04-21 | Stop reason: SDUPTHER

## 2021-10-19 RX ORDER — GABAPENTIN 600 MG/1
TABLET ORAL
Qty: 60 TABLET | Refills: 1 | Status: SHIPPED | OUTPATIENT
Start: 2021-10-19 | End: 2021-11-11 | Stop reason: ALTCHOICE

## 2021-10-19 RX ORDER — GABAPENTIN 400 MG/1
CAPSULE ORAL
Qty: 30 CAPSULE | Refills: 1 | Status: SHIPPED | OUTPATIENT
Start: 2021-10-19 | End: 2021-11-11 | Stop reason: ALTCHOICE

## 2021-10-19 RX ORDER — ONDANSETRON 4 MG/1
TABLET, FILM COATED ORAL
Qty: 40 TABLET | Refills: 3 | Status: SHIPPED | OUTPATIENT
Start: 2021-10-19 | End: 2022-04-21 | Stop reason: SDUPTHER

## 2021-10-19 NOTE — PROGRESS NOTES
All items selected indicate a positive finding. Those items not selected are negative.   Constitutional [] Weight loss/gain   [x] Fatigue  [] Fever/Chills   HEENT [] Hearing Loss  [x] Visual Disturbance  [] Tinnitus  [x] Ear pain   Respiratory [] Shortness of Breath  [] Cough  [] Snoring   Cardiovascular [x] Chest Pain- right  [] Palpitations  [x] Lightheaded   GI [] Constipation  [] Diarrhea  [] Swallowing change  [] Nausea/vomiting    [] Urinary Frequency  [] Urinary Urgency   Musculoskeletal [x] Neck pain  [x] Back pain  [x] Muscle pain  [] Restless legs   Dermatologic [] Skin changes   Neurologic [] Memory loss/confusion  [] Seizures  [x] Trouble walking or imbalance  [x] Dizziness  [x] Sleep disturbance  [x] Weakness  [x] Numbness  [] Tremors  [] Speech Difficulty  [x] Headaches  [x] Light Sensitivity  [x] Sound Sensitivity   Endocrinology []Excessive thirst  []Excessive hunger   Psychiatric [x] Anxiety/Depression  [] Hallucination   Allergy/immunology []Hives/environmental allergies   Hematologic/lymph [] Abnormal bleeding  [] Abnormal bruising

## 2021-10-19 NOTE — PROGRESS NOTES
NEUROLOGY FOLLOW-UP  Patient Name:  Nettie Crews  :   1985  Clinic Visit Date: 10/19/2021  Last visit date: 21      Dear Dr. Trinidad Chicas MD     I saw Ms. Nettie Crews in follow-up in the office today in continuation of neurologic care. She  is a 29 y.o. right handed  female with chronic neck pain and migraine headaches superimposed on chronic tension type headaches. She comes to clinic stating that she has frequent tension type headaches with intermittent paroxysms of migrainous attacks. She had at least \"6 bad migraines per month\". She has been on gabapentin and tizanidine combination with minimal help. She has been on Nurtec qod for migraine headache prophylaxis. She has been to pain clinic and she underwent cervical epidural injection with no relief. They recommended cervical spinal cord stimulation trial and it was not approved by insurance. Patient stated that she has not been to pain clinic since February this year. She is requesting referral to different pain clinic. Her hx is significant for cervical myelopathy s/p cervical spinal fusion (2019). She does have chronic neck pain along with right shoulder pain. She is not having radiating pain and paresthesias presently. She has had neck pain and shoulder pain on right side radiating to Rt hand last 3 digits and also hurts on right torso and right leg and right foot. Admits to having loss of fine motor control in hands and clumsiness in Rt hand. Denied gait difficulties. Of note; she had hx of chronic daily headaches with \"headaches for at least 15 years since teenage years\". With superimposed intermittent migrainous attacks; has been on gabapentin 400 bid, tizanidine nightly for headache prophylaxis. She is also on Maxalt alternating with butalbital for rescue therapy. She is also on Aimovig 70mg once monthly injn preventive therapy.    She had suffered from neck pain with right upper extremity weakness prior to surgery in June 2019. She has had limitation of neck movement. She also admits to having intermittent pain radiating down right upper extremity. Denies bladder dysfunction but admits to having clumsiness with occasional gait disturbances with ongoing neck pain and right shoulder pain. She has tried NSAIDs with no relief. Her migraines occur with aura consisted of severe left parietotemporal throbbing pains associated with photophobia and phonophobia and nausea lasting several hours of the day. Her frequent headaches were described as a pressure like headaches with mild to moderate severity associated with the photophobia only and not associated with nausea and vomiting and able to function with many of these frequent tension type headaches. She also has had neck pain with the spasms and tizanidine has helped. Of note; she has family history of migraine headaches in her grandmother and paternal aunt and sister. She has 2 children, Guero (2011) and Alba Jay (2006)    Medications trials: Failed Topamax, beta blockers and tricyclics (amitriptyline). Review of systems done and pertinent positives include neck pain, headaches, phonophobia, fatigue, right shoulder pain, dizziness with lightheadedness, gait difficulty and anxiety. Admits to anxiety and depression due to ongoing headaches and neck pain. Current Outpatient Medications on File Prior to Visit   Medication Sig Dispense Refill    cloNIDine (CATAPRES) 0.1 MG tablet Take 1 tablet by mouth twice daily. 180 tablet 1    DULoxetine (CYMBALTA) 60 MG extended release capsule Take 1 capsule by mouth daily 90 capsule 1    gabapentin (NEURONTIN) 400 MG capsule Take 1 capsule by mouth 3 times daily for 90 days.  270 capsule 1    Rimegepant Sulfate (NURTEC) 75 MG TBDP Take one tab daily 30 tablet 1    ondansetron (ZOFRAN) 4 MG tablet Take one tab every 12 hr as needed for nausea/ vomiting 40 tablet 3    nortriptyline (PAMELOR) 25 MG capsule Take 1 capsule by mouth nightly 30 capsule 3    tiZANidine (ZANAFLEX) 4 MG tablet Take one tab nightly and half tab in am as needed for spasms 135 tablet 1    rizatriptan (MAXALT) 10 MG tablet Take 1 tablet by mouth once as needed for Migraine May repeat in 2 hours if needed 9 tablet 3    butalbital-acetaminophen-caffeine (FIORICET, ESGIC) -40 MG per tablet TAKE ONE TABLET BY MOUTH DAILY AS NEEDED FOR SEVER HEADACHE 40 tablet 0    Galcanezumab-gnlm (EMGALITY) 120 MG/ML SOAJ INJECT UNDER THE SKIN ONCE A MONTH 3 pen 2    Multiple Vitamins-Minerals (MULTIVITAMIN ADULT PO) Take by mouth daily      VITAMIN D PO Take by mouth daily      Calcium Carbonate Antacid (CALCIUM CARBONATE PO) Take by mouth daily       No current facility-administered medications on file prior to visit. Allergies: Mark Sommers has No Known Allergies. Past Medical History:   Diagnosis Date    Headache 2003    MIGRAINES    Hypertension 05/2019    ON RX    Psoriasis     BEHIND EARS       Past Surgical History:   Procedure Laterality Date    CERVICAL LAMINECTOMY N/A 6/24/2019    POSTERIOR CERVICAL FUSION C3-4  (GLOBUS, SSEP EVOKES CONF# [id number] - ANEL. ) performed by Michelle Trevino MD at [facility]  2007, 2014    x2    CYST REMOVAL Left 2004    breast    NECK SURGERY  06/24/2019    POSTERIOR CERVICAL FUSION C3-4      PAIN MANAGEMENT PROCEDURE N/A 1/25/2021    CERVICAL EPIDURAL STEROID INJECTION C7-T1 performed by Gui Sanchez MD at [facility] N/A 2/8/2021    CERVICAL EPIDURAL STEROID INJECTION C7-T1 performed by Gui Sanchez MD at [address]  2014     Social History: Mark Sommers  reports that she has never smoked. She has never used smokeless tobacco. She reports current alcohol use. She reports that she does not use drugs.     Family History   Problem Relation Age of Onset    High Blood Pressure Father    [facility] Migraines Sister    [facility] Migraines Paternal Grandmother     Heart Disease Paternal Grandmother     Cancer Paternal Grandfather         brain     On exam; Vitals: Blood pressure (!) 123/93, pulse 101, height 5' 4\" (1.626 m), weight 114 lb (51.7 kg), not currently breastfeeding. GENERAL  Appears uncomfortable and in distress with ongoing neck pain   HEENT  presently wearing cervical collar   NECK  Supple and no bruits heard   MENTAL STATUS:  Alert, oriented x3, followed three-step commands well; good naming and good repetition; able to do subtractions well; no dysarthria; normal language; no hallucinations or delusions. CRANIAL NERVES: II     -      PERRLA; VA 20/20 OU., Visual fields intact to confrontation  III,IV,VI -  EOMs full, no afferent defect, no  KEHINDE, no ptosis  V     -     Normal facial sensation  VII    -     Normal facial symmetry  VIII   -     Intact hearing  IX,X -     Symmetrical palate  XI    -     Symmetrical shoulder shrug  XII   -     Midline tongue, no atrophy    MOTOR FUNCTION:  significant for good strength of grade 5/5 in bilateral proximal and distal muscle groups of both upper and lower extremities with normal bulk, normal tone and no involuntary movements, no tremor; Spurling's sign equivocal. Parks's sign negative. SENSORY FUNCTION:  Normal touch, normal pin, normal vibration, normal proprioception   CEREBELLAR FUNCTION:  Intact fine motor control over upper limbs   REFLEX FUNCTION:  Symmetric and brisk DTRs with no parks sign; no Babinski sign   STATION and GAIT  Normal station, antalgic gait        Diagnostic data reviewed with the patient:   MRI Brain (w/wo) (1/31/17): No intracranial abnormality. Normal MRI of the brain.     CBC:     Lab Results   Component Value Date    WBC 6.7 08/11/2020    HGB 14.2 08/11/2020     08/11/2020      BMP:     Lab Results   Component Value Date     08/11/2020    K 4.5 08/11/2020    GLUCOSE 98 08/11/2020    CALCIUM 10.0 08/11/2020         Lab Results Component Value Date    CHOL 199 08/11/2020    LDLCHOLESTEROL 100 08/11/2020    HDL 66 08/11/2020    TRIG 167 (H) 08/11/2020    ALT 11 08/11/2020    AST 17 08/11/2020    TSH 3.19 08/11/2020    LABA1C 4.8 08/11/2020     MRI cervical spine 1/18/19: Multiple levels with small disc protrusions in cervical spine without significant canal stenosis or neural foraminal narrowing. No demyelinating plaques; No abnl enhancement with IV contrast.    EMG Rt UE (1/24/19): There is electrophysiologic evidence of mild right C6/C7 cervical radiculopathy. This study also demonstrated subtle median neuropathy at right wrist suggestive of right carpal tunnel syndrome of mild severity. MRI Cervical Spine (w/wo) 11/5/2020: Status post posterior instrumented fusion of C3-4. Degenerative disc disease without spinal canal stenosis or foraminal stenosis. No abnormal signal in cervical spinal cord. No abnormal enhancement of the spinal cord. Impression and Plan: Ms. Madie Levy is a 29 y.o. female with   Chronic tension type headaches with superimposed migrainous attacks: will increase the dose of gabapentin from 400 +400+400 to 400+600+600 per day for tension type headache prophylaxis. Regarding migraines; to continue nurtec 75 qod with Zofran for migraine headache prophylaxis. Intractable neck pain; refer to pain clinic  Cervical myelopathy s/p cervical spinal fusion (6/24/2019). Follow-up in clinic in 3 months. This note was partially created using voice recognition software and is inherently subject to errors including those of syntax and \"sound alike\" substitutions which may escape proofreading. In such instances, original meaning may be extrapolated by contextual derivation.

## 2021-10-20 ENCOUNTER — TELEPHONE (OUTPATIENT)
Dept: NEUROLOGY | Age: 36
End: 2021-10-20

## 2021-10-20 NOTE — TELEPHONE ENCOUNTER
Benny Reese called and questioned whether the Nurtec is replacing the paitent's rizatriptan and Emgality. I confirmed with Arminda Rios that the patient is still taking the rizatriptan and the Emgality was D/C'd due to the cost of medication.

## 2021-11-10 ASSESSMENT — ENCOUNTER SYMPTOMS
EYES NEGATIVE: 1
CHEST TIGHTNESS: 1
ALLERGIC/IMMUNOLOGIC NEGATIVE: 1
GASTROINTESTINAL NEGATIVE: 1
BACK PAIN: 1

## 2021-11-10 ASSESSMENT — PAIN - FUNCTIONAL ASSESSMENT: PAIN_FUNCTIONAL_ASSESSMENT: PREVENTS OR INTERFERES SOME ACTIVE ACTIVITIES AND ADLS

## 2021-11-10 ASSESSMENT — PAIN DESCRIPTION - PROGRESSION: CLINICAL_PROGRESSION: GRADUALLY WORSENING

## 2021-11-10 ASSESSMENT — PAIN DESCRIPTION - LOCATION: LOCATION: NECK;BACK

## 2021-11-10 ASSESSMENT — PAIN DESCRIPTION - FREQUENCY: FREQUENCY: CONTINUOUS

## 2021-11-10 ASSESSMENT — PAIN DESCRIPTION - ONSET: ONSET: ON-GOING

## 2021-11-10 ASSESSMENT — PAIN SCALES - GENERAL: PAINLEVEL_OUTOF10: 8

## 2021-11-10 ASSESSMENT — PAIN DESCRIPTION - PAIN TYPE: TYPE: CHRONIC PAIN

## 2021-11-10 ASSESSMENT — PAIN DESCRIPTION - ORIENTATION: ORIENTATION: RIGHT

## 2021-11-10 NOTE — PROGRESS NOTES
clavicle feels like it is displaced. She is having problems moving her shoulder joint right side. Neck Pain   This is a chronic problem. The problem occurs constantly. The problem has been gradually worsening. The pain is associated with nothing. The pain is present in the anterior neck, midline, right side and occipital region (sharp). Quality: sharp. The pain is at a severity of 9/10 (8-10). The pain is severe. Exacerbated by: Moment but patient cannot relate to any factors that would aggravate. The pain is same all the time. Associated symptoms include headaches, numbness, tingling and weakness. Associated symptoms comments: Whole rt side. RX Monitoring 1/8/2021   Attestation -   Periodic Controlled Substance Monitoring No signs of potential drug abuse or diversion identified. Current Pain Assessment  Pain Assessment  Pain Assessment: 0-10  Pain Level: 8  Patient's Stated Pain Goal: 2 (increase physical activity)  Pain Type: Chronic pain  Pain Location: Neck, Back  Pain Orientation: Right  Pain Radiating Towards: whole right side hurts; right arm to fingers; right leg to toes.   Pain Descriptors: Headache, Sore, Sharp, Discomfort, Numbness, Tingling, Pins and needles  Pain Frequency: Continuous  Pain Onset: On-going  Clinical Progression: Gradually worsening  Functional Pain Assessment: Prevents or interferes some active activities and ADLs  Non-Pharmaceutical Pain Intervention(s): Rest, Massage         ADVERSE MEDICATION EFFECTS:   Constipation: no  Bowel Regimen: Yes  Diet: common adult  Appetite: loss of appetite   Sedation: no  Urinary Retention: no    FOCUSED PAIN SCALE:  Highest: 10  Lowest: 8  Average: Range- 9  When and What was your last procedure:  JANNET with Dr. Chris Kirkland     Was your procedure effective: no    ACTIVITY/SOCIAL/EMOTIONAL:  Sleep Pattern: 5 hours per night. nightime awakenings  Energy Level: Tired/Fatigued  Currently attending Physical Therapy: No  Home Exercises: three times a week stretches  Mobility: no current mobility problem   Do you use assistive devices? No  Have you fallen in the last 30 days? No  Currently seeing a Psychiatrist or Psychologist: No  Emotional Issues: anxiety/ nervousness and depression with no suicidal thoughts   Mood: appropriate     ABERRANT BEHAVIORS SINCE LAST VISIT:  Have you ever been treated in another Pain Clinic yes with Dr. Aaron Torres for prescriptions appropriate: not applicable  Lost Rx/pills: not applicable  Taking more medication than prescribed: not applicable  Are you receiving PAIN medications from other doctors: no  Last Urine/Serum Drug Screen:   Was Serum/UDS as anticipated?  not applicable  Brought pill bottles in:not applicable   Was Pill count appropriate? :not applicable   Are currently pregnant? no  Recent ER visits: Yes in the past month  Have you had a COVID 19 Vaccine? No  Total SOAP points:  0      BP (!) 130/94   Pulse 102   Temp 97.7 °F (36.5 °C) (Infrared)   Resp 20   Ht 5' 4\" (1.626 m)   Wt 114 lb (51.7 kg)   BMI 19.57 kg/m²     Past Medical History      Diagnosis Date    Headache 2003    MIGRAINES    Hypertension 05/2019    ON RX    Psoriasis     BEHIND EARS       Surgical History  Past Surgical History:   Procedure Laterality Date    CERVICAL LAMINECTOMY N/A 6/24/2019    POSTERIOR CERVICAL FUSION C3-4  (GLOBUS, SSEP [facility]OKES CONF# [id number] - ANEL. ) performed by Hair Robison MD at [facility] 3  2007, 2014    x2    CYST REMOVAL Left 2004    breast    NECK SURGERY  06/24/2019    POSTERIOR CERVICAL FUSION C3-4      PAIN MANAGEMENT PROCEDURE N/A 1/25/2021    CERVICAL EPIDURAL STEROID INJECTION C7-T1 performed by Berna Johnson MD at [address] [place] [address] N/A 2/8/2021    CERVICAL EPIDURAL STEROID INJECTION C7-T1 performed by Berna Johnson MD at [place] [address]  2014       Medications  Current Outpatient Medications   Medication Sig Dispense Refill    pregabalin (Reyna Marquez) 50 MG capsule Take 1 capsule by mouth 3 times daily for 30 days. 90 capsule 3    Rimegepant Sulfate (NURTEC) 75 MG TBDP Take one tab every other day 15 tablet 3    tiZANidine (ZANAFLEX) 4 MG tablet Take one tab nightly and half tab in am as needed for spasms 135 tablet 1    ondansetron (ZOFRAN) 4 MG tablet Take one tab every 12 hr as needed for nausea/ vomiting 40 tablet 3    cloNIDine (CATAPRES) 0.1 MG tablet Take 1 tablet by mouth twice daily. 180 tablet 1    DULoxetine (CYMBALTA) 60 MG extended release capsule Take 1 capsule by mouth daily 90 capsule 1    nortriptyline (PAMELOR) 25 MG capsule Take 1 capsule by mouth nightly 30 capsule 3    rizatriptan (MAXALT) 10 MG tablet Take 1 tablet by mouth once as needed for Migraine May repeat in 2 hours if needed 9 tablet 3    butalbital-acetaminophen-caffeine (FIORICET, ESGIC) -40 MG per tablet TAKE ONE TABLET BY MOUTH DAILY AS NEEDED FOR SEVER HEADACHE 40 tablet 0    Multiple Vitamins-Minerals (MULTIVITAMIN ADULT PO) Take by mouth daily      VITAMIN D PO Take by mouth daily      Calcium Carbonate Antacid (CALCIUM CARBONATE PO) Take by mouth daily       No current facility-administered medications for this encounter. Allergies  Patient has no known allergies. Family History  family history includes Cancer in her paternal grandfather; Heart Disease in her paternal grandmother; High Blood Pressure in her father; Migraines in her paternal grandmother and sister. Social History  Social History     Socioeconomic History    Marital status:      Spouse name: None    Number of children: None    Years of education: None    Highest education level: None   Occupational History    None   Tobacco Use    Smoking status: Never Smoker    Smokeless tobacco: Never Used   Vaping Use    Vaping Use: Never used   Substance and Sexual Activity    Alcohol use:  Yes     Alcohol/week: 0.0 standard drinks     Comment: MIXED DRINKS - twice a year    Drug use: No    Sexual activity: Yes     Partners: Male   Other Topics Concern    None   Social History Narrative    None     Social Determinants of Health     Financial Resource Strain: Low Risk     Difficulty of Paying Living Expenses: Not hard at all   Food Insecurity: No Food Insecurity    Worried About Running Out of Food in the Last Year: Never true    [address] in the Last Year: Never true   Transportation Needs:     Lack of Transportation (Medical): Not on file    Lack of Transportation (Non-Medical): Not on file   Physical Activity:     Days of Exercise per Week: Not on file    Minutes of Exercise per Session: Not on file   Stress:     Feeling of Stress : Not on file   Social Connections:     Frequency of Communication with Friends and Family: Not on file    Frequency of Social Gatherings with Friends and Family: Not on file    Attends Buddhist Services: Not on file    Active Member of [address] [place] [address] [employer] or Organizations: Not on file    Attends Club or Organization Meetings: Not on file    Marital Status: Not on file   Intimate Partner Violence:     Fear of Current or Ex-Partner: Not on file    Emotionally Abused: Not on file    Physically Abused: Not on file    Sexually Abused: Not on file   Housing Stability:     Unable to Pay for Housing in the Last Year: Not on file    Number of [place] in the Last Year: Not on file    Unstable Housing in the Last Year: Not on file      reports no history of drug use. REVIEW OF SYSTEMS:    Review of Systems   Constitutional: Positive for activity change and appetite change. HENT: Positive for ear pain. Jaw pain   Eyes: Negative. Respiratory: Positive for chest tightness. Cardiovascular: Negative. Gastrointestinal: Negative. Endocrine: Negative. Genitourinary: Negative. Musculoskeletal: Positive for arthralgias, back pain, neck pain and neck stiffness. Skin: Negative. Allergic/Immunologic: Negative.     Neurological: Positive for tingling, weakness, numbness and headaches. Hematological: Negative. Psychiatric/Behavioral: The patient is nervous/anxious. GENERAL PHYSICAL EXAM:  Vitals: BP (!) 130/94   Pulse 102   Temp 97.7 °F (36.5 °C) (Infrared)   Resp 20   Ht 5' 4\" (1.626 m)   Wt 114 lb (51.7 kg)   BMI 19.57 kg/m² , Body mass index is 19.57 kg/m². Physical Exam  Constitutional:       Appearance: Normal appearance. HENT:      Head: Normocephalic and atraumatic. Nose: Nose normal.      Mouth/Throat:      Mouth: Mucous membranes are moist.   Eyes:      Extraocular Movements: Extraocular movements intact. Conjunctiva/sclera: Conjunctivae normal.      Pupils: Pupils are equal, round, and reactive to light. Cardiovascular:      Rate and Rhythm: Normal rate and regular rhythm. Pulses: Normal pulses. Pulmonary:      Effort: Pulmonary effort is normal.      Breath sounds: Normal breath sounds. Abdominal:      General: Bowel sounds are normal. There is no distension. Palpations: Abdomen is soft. Musculoskeletal:      Right lower leg: No edema. Left lower leg: No edema. Skin:     General: Skin is warm. Findings: No rash. Comments: Skin color is good there is good capillary refill. Peripheral pulses present   Neurological:      Mental Status: She is alert and oriented to person, place, and time. Cranial Nerves: Cranial nerves are intact. No cranial nerve deficit. Deep Tendon Reflexes:      Reflex Scores:       Tricep reflexes are 3+ on the right side and 3+ on the left side. Bicep reflexes are 3+ on the right side and 3+ on the left side. Patellar reflexes are 3+ on the right side and 3+ on the left side. Achilles reflexes are 3+ on the right side and 3+ on the left side. Comments: examination of the sensory system and may take the patient complains of numb feeling with increased sensitivity.   There is diffuse tenderness in the muscles on the right side especially in the cervical region. Psychiatric:         Mood and Affect: Mood is anxious. Speech: Speech normal.         Behavior: Behavior normal.         Thought Content: Thought content normal.         Cognition and Memory: Cognition normal.         Judgment: Judgment normal.      Back Exam     Tenderness   The patient is experiencing tenderness in the cervical.    Range of Motion   Back extension: Somewhat limited and painful. Back flexion: Somewhat limited and painful. Back lateral bend right: Somewhat limited and painful. Back rotation right: Somewhat limited and painful. Back rotation left: Somewhat limited and painful. Other   Scars: absent    Comments:  Well-healed surgical scar posteriorly in the neck from previous surgery. There is loss of cervical lordosis with severe tenderness on palpation the paraspinal muscles especially on the right side      Right Hand Exam     Muscle Strength   The patient has normal right wrist strength. Right Elbow Exam     Muscle Strength   The patient has normal right elbow strength. Left Elbow Exam     Muscle Strength   The patient has normal left elbow strength. Right Shoulder Exam     Tenderness   The patient is experiencing tenderness in the acromioclavicular joint, acromion and clavicle. Range of Motion   Right shoulder active abduction: Painful and restricted. Muscle Strength   The patient has normal right shoulder strength. Left Shoulder Exam   Left shoulder exam is normal.    Muscle Strength   The patient has normal left shoulder strength. Nurses Notes and Vital Signs reviewed.     DATA  Labs:      Ref Range & Units 6/25/19 0524   Glucose 70 - 99 mg/dL 152 High     BUN 6 - 20 mg/dL 11    CREATININE 0.50 - 0.90 mg/dL 0.72    Bun/Cre Ratio 9 - 20 NOT REPORTED    Calcium 8.6 - 10.4 mg/dL 9.3    Sodium 135 - 144 mmol/L 135    Potassium 3.7 - 5.3 mmol/L 5.3    Chloride 98 - 107 mmol/L 103    CO2 20 - 31 mmol/L 21    Anion Gap 9 - 17 mmol/L 11    Alkaline Phosphatase 35 - 104 U/L 41    ALT 5 - 33 U/L 9    AST <32 U/L 17    Total Bilirubin 0.3 - 1.2 mg/dL 0.81    Total Protein 6.4 - 8.3 g/dL 6.8    Albumin 3.5 - 5.2 g/dL 3.9    Albumin/Globulin Ratio 1.0 - 2.5 1.3    GFR Non-African American >60 mL/min >60    GFR African American >60 mL/min >60    GFR Comment           Imaging:  Radiology Images and Reports reviewed where indicated and necessary     EXAMINATION:   MRI OF THE CERVICAL SPINE WITHOUT AND WITH CONTRAST  11/5/2020 12:55 pm:       TECHNIQUE:   Multiplanar multisequence MRI of the cervical spine was performed without and   with the administration of intravenous contrast.       COMPARISON:   MRI cervical spine April 30, 2019       HISTORY:   ORDERING SYSTEM PROVIDED HISTORY: Demyelinating disease of central nervous   system ([facility] [address]   TECHNOLOGIST PROVIDED HISTORY:   cervical myelopathy with clusiness, right sided weakness; neck pain;   worsening headaches   Is the patient pregnant?->No   Reason for Exam: NUMBNESS/ TINGLING ON RIGHT SIDE OF BODY. X7 MONTHS. (HX   CSPINE SURGERY 2019)   Acuity: Acute   Type of Exam: Unknown       FINDINGS:   BONES/ALIGNMENT: The patient is status post posterior instrumented fusion of   C3-4.  There is straightening of normal cervical lordosis.  There is normal   alignment of the cervical spine.  The vertebral body heights are maintained. The bone marrow signal appears unremarkable.  There is no fracture or   destructive osseous lesion.  There is degenerative disc disease with disc   desiccation.  The intervertebral disc heights are grossly maintained.       SPINAL CORD: No abnormal cord signal is seen.  No abnormal enhancement in the   cervical spinal canal or within the cervical spinal cord.       SOFT TISSUES: No abnormal enhancement of the cervical spine.  No paraspinal   mass identified.       C2-C3: There is no significant disc protrusion, spinal canal stenosis or neural foraminal narrowing.  Stable.       C3-C4: There is no significant disc protrusion, spinal canal stenosis or   neural foraminal narrowing.  Stable.       C4-C5: There is disc bulge without spinal canal or foraminal stenosis.  Stable       C5-C6: There is disc bulge without spinal canal or foraminal stenosis. Stable.       C6-C7: There is no significant disc protrusion, spinal canal stenosis or   neural foraminal narrowing.  Stable.       C7-T1: There is no significant disc protrusion, spinal canal stenosis or   neural foraminal narrowing.  Stable.           Impression   Status post posterior instrumented fusion of C3-4.       Degenerative disc disease without spinal canal or foraminal stenosis.       No abnormal signal or enhancement in the cervical spinal cord. 6 /24/2019      Patient Active Problem List   Diagnosis    Chronic rhinitis    Intractable chronic migraine without aura and without status migrainosus    Muscle spasm    Chronic tension-type headache, intractable    Cervical radiculopathy    Essential hypertension    History of fusion of cervical spine    Anxiety and depression    Failed back syndrome of cervical spine        ASSESSMENT    Reta West is a 29 y.o. female with     1. Cervical radiculopathy    2. Degenerative disc disease, cervical    3. S/P cervical spinal fusion    4. Fibromyalgia           PLAN  Patient's   [] x-ray    [] CT scan    [x] MRI  Were/was  Reviewed. These findings are inconsistent with the patient's symptoms and physical examination    [x] Patient's findings on the x-ray were explained to the patient using a bone modal.    Other reports reviewed include    [] Bone scan   [] EMG and nerve conduction studies   [x] Referral reports-  I also discussed with him the following treatment options Including advantages and disadvantages of each:    [x] Physical therapy    [x] Interventional pain treatment    [] Medication management    [] Surgical options    Patient's OARRS were reviewed. It is acceptable and appears patient is not receiving prescriptions from multiple prescribers. Patient is  forthcoming regarding prescriptions for pain medication in the past  Controlled Substances Monitoring: The following screens were also reviewed  SOAPP- the score is 0. (Values:cates patient is  <4minimal potential  4-7 Moderate potential  >7 High potential  for drug addiction  Counselling/Preventive measures for pain  Control:    [x]  Spine strengthening exercises are discussed with patient in detail. It is very hard to explain her symptoms based on physical findings as well as a x-ray findings. Since she had fusion hence we will obtain an x-ray of the cervical spine with flexion and extension views to check for any instability at other levels. I will also start her on Lyrica to see if that could help her pain symptoms. Patient was told I am unable to explain her symptoms and it may be better for her if she could get another opinion from different physicians. She is also advised to see ophthalmologist because of the  visual disturbances . Orders Placed This Encounter   Procedures    XR CERVICAL SPINE FLEXION AND EXTENSION     Standing Status:   Future     Number of Occurrences:   1     Standing Expiration Date:   5/11/2022    DRUG SCREEN, PAIN     Standing Status:   Standing     Number of Occurrences:   1       Decision Making Process : Patient's health history and referral records thoroughly reviewed before focused physical examination and discussion with patient. Over 50% of today's visit is spent on examining the patient and counseling. Level of complexity of date to be reviewed is Moderate. The chart date reviewed include the following: Imaging Reports. Summary of Care. Time spent reviewing with patient the below reports:   Medication safety, Treatment options.     Level of diagnosis and management options of this case is multiple: involving the following management options: Interventions as needed, medication management as appropriate, future visits, activity modification, heat/ice as needed, Urine drug screen as required. [x]The patient's questions were answered to the best of my abilities. Return in  4 weeks  with Farooq Frey M.D.  for further plan of treatment. Documentation:  I communicated with the patient and/or health care decision maker about plan of care  Details of this discussion including any medical advice provided: Total Time: 45-55 minutes    This note was created using voice recognition software. There may be inaccuracies of transcription  that are inadvertently overlooked prior to the signature. There is any questions about the transcription please contact me.     Electronically signed by Laney Lino MD on 11/11/2021 at 7:47 PM

## 2021-11-11 ENCOUNTER — HOSPITAL ENCOUNTER (OUTPATIENT)
Age: 36
Discharge: HOME | End: 2021-11-13
Payer: COMMERCIAL

## 2021-11-11 ENCOUNTER — HOSPITAL ENCOUNTER (OUTPATIENT)
Dept: PAIN MANAGEMENT | Age: 36
Discharge: HOME | End: 2021-11-11
Payer: COMMERCIAL

## 2021-11-11 ENCOUNTER — HOSPITAL ENCOUNTER (OUTPATIENT)
Dept: GENERAL RADIOLOGY | Age: 36
Discharge: HOME | End: 2021-11-13
Payer: COMMERCIAL

## 2021-11-11 VITALS
SYSTOLIC BLOOD PRESSURE: 130 MMHG | HEIGHT: 64 IN | BODY MASS INDEX: 19.46 KG/M2 | RESPIRATION RATE: 20 BRPM | DIASTOLIC BLOOD PRESSURE: 94 MMHG | WEIGHT: 114 LBS | TEMPERATURE: 97.7 F | HEART RATE: 102 BPM

## 2021-11-11 DIAGNOSIS — M50.30 DEGENERATIVE DISC DISEASE, CERVICAL: ICD-10-CM

## 2021-11-11 DIAGNOSIS — M54.12 CERVICAL RADICULOPATHY: Primary | ICD-10-CM

## 2021-11-11 DIAGNOSIS — M54.12 CERVICAL RADICULOPATHY: ICD-10-CM

## 2021-11-11 DIAGNOSIS — Z98.1 S/P CERVICAL SPINAL FUSION: ICD-10-CM

## 2021-11-11 DIAGNOSIS — M79.7 FIBROMYALGIA: ICD-10-CM

## 2021-11-11 PROCEDURE — 99203 OFFICE O/P NEW LOW 30 MIN: CPT

## 2021-11-11 PROCEDURE — 72040 X-RAY EXAM NECK SPINE 2-3 VW: CPT

## 2021-11-11 PROCEDURE — 99214 OFFICE O/P EST MOD 30 MIN: CPT | Performed by: PAIN MEDICINE

## 2021-11-11 PROCEDURE — 80307 DRUG TEST PRSMV CHEM ANLYZR: CPT

## 2021-11-11 RX ORDER — PREGABALIN 50 MG/1
50 CAPSULE ORAL 3 TIMES DAILY
Qty: 90 CAPSULE | Refills: 3 | Status: SHIPPED
Start: 2021-11-11 | End: 2022-01-17 | Stop reason: DRUGHIGH

## 2021-11-12 NOTE — RESULT ENCOUNTER NOTE
FINDINGS:  Lateral radiographs were submitted in flexion and extension. No neutral  positioning was provided. The patient is status post C3-4 posterior fusion. There is no evidence subluxation on flexion or extension radiographs. No  acute fracture or malalignment. No prevertebral soft tissue swelling.     IMPRESSION:  No evidence of cervical spine instability.   Electronically signed by Haven Martinez MD on 11/12/21 at 4:54 PM EST

## 2021-11-14 LAB
6-ACETYLMORPHINE, UR: NOT DETECTED
7-AMINOCLONAZEPAM, URINE: NOT DETECTED
ALPHA-OH-ALPRAZ, URINE: NOT DETECTED
ALPHA-OH-MIDAZOLAM, URINE: NOT DETECTED
ALPRAZOLAM, URINE: NOT DETECTED
AMPHETAMINES, URINE: NOT DETECTED
BARBITURATES, URINE: NOT DETECTED
BENZOYLECGONINE, UR: NOT DETECTED
BUPRENORPHINE URINE: NOT DETECTED
CARISOPRODOL, UR: NOT DETECTED
CLONAZEPAM, URINE: NOT DETECTED
CODEINE, URINE: NOT DETECTED
CREATININE URINE: 65.1 MG/DL (ref 20–400)
DIAZEPAM, URINE: NOT DETECTED
DRUGS EXPECTED, UR: NORMAL
EER HI RES INTERP UR: NORMAL
ETHYL GLUCURONIDE UR: NOT DETECTED
FENTANYL URINE: NOT DETECTED
GABAPENTIN: PRESENT
HYDROCODONE, URINE: NOT DETECTED
HYDROMORPHONE, URINE: NOT DETECTED
LORAZEPAM, URINE: NOT DETECTED
MARIJUANA METAB, UR: PRESENT
MDA, UR: NOT DETECTED
MDEA, EVE, UR: NOT DETECTED
MDMA URINE: NOT DETECTED
MEPERIDINE METAB, UR: NOT DETECTED
METHADONE, URINE: NOT DETECTED
METHAMPHETAMINE, URINE: NOT DETECTED
METHYLPHENIDATE: NOT DETECTED
MIDAZOLAM, URINE: NOT DETECTED
MORPHINE URINE: NOT DETECTED
NALOXONE URINE: NOT DETECTED
NORBUPRENORPHINE, URINE: NOT DETECTED
NORDIAZEPAM, URINE: NOT DETECTED
NORFENTANYL, URINE: NOT DETECTED
NORHYDROCODONE, URINE: NOT DETECTED
NOROXYCODONE, URINE: NOT DETECTED
NOROXYMORPHONE, URINE: NOT DETECTED
OXAZEPAM, URINE: NOT DETECTED
OXYCODONE URINE: NOT DETECTED
OXYMORPHONE, URINE: NOT DETECTED
PAIN MANAGEMENT DRUG PANEL INTERP, URINE: NORMAL
PAIN MGT DRUG PANEL, HI RES, UR: NORMAL
PCP,URINE: NOT DETECTED
PHENTERMINE, UR: NOT DETECTED
PREGABALIN: NOT DETECTED
TAPENTADOL, URINE: NOT DETECTED
TAPENTADOL-O-SULFATE, URINE: NOT DETECTED
TEMAZEPAM, URINE: NOT DETECTED
TRAMADOL, URINE: NOT DETECTED
ZOLPIDEM METABOLITE (ZCA), URINE: NOT DETECTED
ZOLPIDEM, URINE: NOT DETECTED

## 2021-12-02 ASSESSMENT — ENCOUNTER SYMPTOMS
CHEST TIGHTNESS: 1
BACK PAIN: 1
EYES NEGATIVE: 1
ALLERGIC/IMMUNOLOGIC NEGATIVE: 1
GASTROINTESTINAL NEGATIVE: 1

## 2021-12-02 NOTE — PROGRESS NOTES
Danya Green is a 29 y.o. female evaluated on 12/9/2021. Modality of virtual service provided -via telephone   Consent:  Patient and/or health care decision maker is aware that that patient may receive a bill for this telephone service, depending on one's insurance coverage, and has provided verbal consent to proceed: Yes    Patient identification was verified at the start of the visit: Yes    Chief complaint: Danya Green is 29 y.o.,  female, with  with chief complaint of pain involving cervical region. .    Diagnosis  R52 (ICD-10-CM) - Intractable pain  M54.2, G89.29 (ICD-10-CM) - Chronic neck pain    Patient is complaining of pain involving the mid back intrascapular area as well as in the neck. The pain radiates anteriorly to the clavicle to the anterior aspect of the neck. Patient also having problems with her right shoulder and her right clavicle feels \"displaced\" she reports overall her pain is better since she was started on pregabalin and denies any side effects. Overall her pain is unchanged but intensity is less with the medications. Patient also has pain involving the right side of the body which is unchanged. Neck Pain   This is a chronic problem. The problem occurs constantly. The problem has been gradually worsening. The pain is associated with nothing. The pain is present in the anterior neck, midline, right side and occipital region (sharp). Quality: sharp. The pain is at a severity of 6/10 (6-10). The pain is moderate. Exacerbated by: Moment but patient cannot relate to any factors that would aggravate. The pain is same all the time. Associated symptoms include headaches, numbness, tingling and weakness. Pertinent negatives include no chest pain. Associated symptoms comments: Whole rt side. Alleviating factors:ice and rest   Lifestyle changes experienced with pain: Wakes from sleep, Prevents or limits ADLs, Increases w/activity.    Increases w/prolonged sitting/standing/walking  Mood changes,anxious and depressed  Patient currently employed. Physical therapy did not help the pain. Are you under psychological counseling at present: No  Goals for treatment include:  Decrease in pain  Enjoy daily and recreational activities, return to previous status. Patient relates current medications are helping the pain. Patient reports taking pain medications as prescribed, denies obtaining medications from different sources and denies use of illegal drugs. Patient denies side effects from medications like nausea, vomiting, constipation or drowsiness. Patient reports current activities of daily living ar possible due to medications and would like to continue them. ACTIVITY/SOCIAL/EMOTIONAL:  Sleep Pattern: 6 hours per night. nightime awakenings and difficulty falling back asleep if awakened  Home Exercises:  Walking  Activity:unchanged  Emotional Issues: anxiety/ nervousness. Currently seeing a Psychiatrist or Psychologist:  No     ADVERSE MEDICATION EFFECTS:   Nausea and vomiting: no   Constipation: yes-Undercontrol-: yes takes MG-  Dizziness/drowsy/sleepy--yes  Urinary Retention: no    ABERRANT BEHAVIORS SINCE LAST VISIT  Lost rx/pills:------------------------------------------ yes  Taking  medication as prescribed: ----------- yes  Urine Drug Screen ---------------------------------  yes             Date----------------------------------------------- 11/11/2021              Results as expected --------------------- [facility] present  Recent ER visits: -------------------------------------No          Past Medical History:   Diagnosis Date    Headache 2003    MIGRAINES    Hypertension 05/2019    ON RX    Psoriasis     BEHIND EARS       Past Surgical History:   Procedure Laterality Date    CERVICAL LAMINECTOMY N/A 6/24/2019    POSTERIOR CERVICAL FUSION C3-4  (GLOBUS, SSEP EVOKES CONF# [id number] - ANEL. ) performed by Karyle Liverpool, MD at [address] [facility] SECTION  2007, 2014    x2    CYST REMOVAL Left 2004    breast    NECK SURGERY  06/24/2019    POSTERIOR CERVICAL FUSION C3-4      PAIN MANAGEMENT PROCEDURE N/A 1/25/2021    CERVICAL EPIDURAL STEROID INJECTION C7-T1 performed by Janis Astorga MD at [address] [facility] N/A 2/8/2021    CERVICAL EPIDURAL STEROID INJECTION C7-T1 [facility] [address] [facility]       Family History   Problem Relation Age of Onset    High Blood Pressure Father     Migraines Sister     Migraines Paternal Grandmother     Heart Disease Paternal Grandmother     Cancer Paternal Grandfather         brain       Social History     Socioeconomic History    Marital status:      Spouse name: Not on file    Number of children: Not on file    Years of education: Not on file    Highest education level: Not on file   Occupational History    Not on file   Tobacco Use    Smoking status: Never Smoker    Smokeless tobacco: Never Used   Vaping Use    Vaping Use: Never used   Substance and Sexual Activity    Alcohol use: Yes     Alcohol/week: 0.0 standard drinks     Comment: MIXED DRINKS - twice a year    Drug use: No    Sexual activity: Yes     Partners: Male   Other Topics Concern    Not on file   Social History Narrative    Not on file     Social Determinants of Health     Financial Resource Strain: Low Risk     Difficulty of Paying Living Expenses: Not hard at all   Food Insecurity: No Food Insecurity    Worried About [address] in the Last Year: Never true    [address] in the Last Year: Never true   Transportation Needs:     Lack of Transportation (Medical): Not on file    Lack of Transportation (Non-Medical):  Not on file   Physical Activity:     Days of Exercise per Week: Not on file    Minutes of Exercise per Session: Not on file   Stress:     Feeling of Stress : Not on file   Social Connections:     Frequency of Communication with Friends and Family: Not on file    Frequency of Social Gatherings with Friends and Family: Not on file    Attends Buddhism Services: Not on file    Active Member of Clubs or Organizations: Not on file    Attends Club or Organization Meetings: Not on file    Marital Status: Not on file   Intimate Partner Violence:     Fear of Current or Ex-Partner: Not on file    Emotionally Abused: Not on file    Physically Abused: Not on file    Sexually Abused: Not on file   Housing Stability:     Unable to Pay for Housing in the Last Year: Not on file    Number of [place] in the Last Year: Not on file    Unstable Housing in the Last Year: Not on file       No Known Allergies    Current Outpatient Medications on File Prior to Encounter   Medication Sig Dispense Refill    pregabalin (LYRICA) 50 MG capsule Take 1 capsule by mouth 3 times daily for 30 days. 90 capsule 3    Rimegepant Sulfate (NURTEC) 75 MG TBDP Take one tab every other day 15 tablet 3    tiZANidine (ZANAFLEX) 4 MG tablet Take one tab nightly and half tab in am as needed for spasms 135 tablet 1    ondansetron (ZOFRAN) 4 MG tablet Take one tab every 12 hr as needed for nausea/ vomiting 40 tablet 3    cloNIDine (CATAPRES) 0.1 MG tablet Take 1 tablet by mouth twice daily.  180 tablet 1    DULoxetine (CYMBALTA) 60 MG extended release capsule Take 1 capsule by mouth daily 90 capsule 1    nortriptyline (PAMELOR) 25 MG capsule Take 1 capsule by mouth nightly 30 capsule 3    rizatriptan (MAXALT) 10 MG tablet Take 1 tablet by mouth once as needed for Migraine May repeat in 2 hours if needed 9 tablet 3    butalbital-acetaminophen-caffeine (FIORICET, ESGIC) -40 MG per tablet TAKE ONE TABLET BY MOUTH DAILY AS NEEDED FOR SEVER HEADACHE 40 tablet 0    Multiple Vitamins-Minerals (MULTIVITAMIN ADULT PO) Take by mouth daily      VITAMIN D PO Take by mouth daily      Calcium Carbonate Antacid (CALCIUM CARBONATE PO) Take by mouth daily       No current the cervical spine was performed without and   with the administration of intravenous contrast.       COMPARISON:   MRI cervical spine April 30, 2019       HISTORY:   ORDERING SYSTEM PROVIDED HISTORY: Demyelinating disease of central nervous   system [facility])   TECHNOLOGIST PROVIDED HISTORY:   cervical myelopathy with clusiness, right sided weakness; neck pain;   worsening headaches   Is the patient pregnant?->No   Reason for Exam: NUMBNESS/ TINGLING ON RIGHT SIDE OF BODY. X7 MONTHS. (HX   CSPINE SURGERY 2019)   Acuity: Acute   Type of Exam: Unknown       FINDINGS:   BONES/ALIGNMENT: The patient is status post posterior instrumented fusion of   C3-4. There is straightening of normal cervical lordosis. There is normal   alignment of the cervical spine. The vertebral body heights are maintained. The bone marrow signal appears unremarkable. There is no fracture or   destructive osseous lesion. There is degenerative disc disease with disc   desiccation. The intervertebral disc heights are grossly maintained. SPINAL CORD: No abnormal cord signal is seen. No abnormal enhancement in the   cervical spinal canal or within the cervical spinal cord. SOFT TISSUES: No abnormal enhancement of the cervical spine. No paraspinal   mass identified. C2-C3: There is no significant disc protrusion, spinal canal stenosis or   neural foraminal narrowing. Stable. C3-C4: There is no significant disc protrusion, spinal canal stenosis or   neural foraminal narrowing. Stable. C4-C5: There is disc bulge without spinal canal or foraminal stenosis. Stable       C5-C6: There is disc bulge without spinal canal or foraminal stenosis. Stable. C6-C7: There is no significant disc protrusion, spinal canal stenosis or   neural foraminal narrowing. Stable. C7-T1: There is no significant disc protrusion, spinal canal stenosis or   neural foraminal narrowing. Stable.            Impression   Status post posterior instrumented fusion of C3-4. Degenerative disc disease without spinal canal or foraminal stenosis. No abnormal signal or enhancement in the cervical spinal cord. Clinical  impression:  1. Cervical radiculopathy    2. Fibromyalgia    3. Degenerative disc disease, cervical    4. S/P cervical spinal fusion    5. Chronic tension-type headache, intractable    6. Chronic neck pain        Plan of care: We will continue current pain medications  Current medications are being tolerated without any Adverse side effects. Patient has refills on her pregabalin and does not do need a refill urine drug screens have been appropriate. No aberrant activity noted. Analgesia is achieved. Activities of daily living are possible because of medications. Safe use of medications explained to patient. PDMP Monitoring:    Last PDMP Maria Guadalupe Schaeffer as Reviewed [facility]):  Review User Review Instant Review Result   Edda [place] 12/2/2021  5:28 AM Reviewed PDMP [1]     Counselling/Preventive measures for pain  Control:    [x]  Spine strengthening exercises are discussed with patient in detail. [x] Ill effects of being on chronic pain medications such as sleep disturbances, hormonal changes, withdrawal symptoms,  chronic opioid dependence and tolerance were discussed with patient. I had asked the patient to minimize medication use and utilize pain medications only for uncontrolled rest pain or pain with exertional activities. I advised patient not to self escalate pain medications without consulting with us. At each of patient's future visits we will try to taper pain medications, while adjusting the adjunct medications, and re-evaluating for Physical Therapy to improve spinal and joint strength. We will continue to have discussions to decrease pain medications as tolerated.    I also discussed with the patient regarding the dangers of combining narcotic pain medication with tranquilizers, alcohol or illegal drugs or taking the medication any other than prescribed. The dangers including the respiratory depression and death. Patient was told to tell  to all  physicians regarding the medications he is getting from pain clinic. Patient is warned not to take any unprescribed medications over-the-counter medications that can depress breathing . Patient is advised to talk to the pharmacist or physicians if planning to take any over-the-counter medications before  takeing them. Patient is strongly advised to avoid tranquilizers or  Relaxants for any medications that can depress breathing or recreational drugs. Patient is also advised to tell us if there is any changes in his medications from other physicians. We discussed the same at today's visit and have not been to implement it, as the patient's pain is not under control with current medications. Patient is doing well with the current medications we will see her once in 3 months  Decision Making Process : Patient's health history and referral records thoroughly reviewed before focused physical examination and discussion with patient. Over 50% of today's visit is spent on examining the patient and counseling and coordinating the care. Level of complexity of date to be reviewed is Moderate. The chart date reviewed include the following: Imaging Reports. Summary of Care. Time spent reviewing with patient the below reports:   Medication safety, Treatment options. Level of diagnosis and management options of this case is multiple: involving the following management options: Interventions as needed, medication management as appropriate, future visits, activity modification, heat/ice as needed, Urine drug screen as required. [x]The patient's questions were answered to the best of my abilities. This note was created using voice recognition software. There may be inaccuracies of transcription  that are inadvertently overlooked prior to the signature.   There is any questions about the transcription please contact me. Patient is doing well with therapy Robtomas and hence we will continue her on the medication. She will be seen in every 12 weeks for follow-up and further planning unless her pain changes. She is told to call the pain clinic if there is any change in the pain for an earlier appointment. Return in  8-12 weeks  with physician / CNP  for further plan of treatment. Due to the COVID-19 pandemic and the appropriate interventions by Aaron Henry, our non-urgent pain management patients will not be seen in the office at this time for their protection and the protection of our staff. To offer continuity of care, their prescriptions will be escribed this month after a careful chart review and review of their OARRS report  Pursuant to the emergency declaration under the [address] and [place] [address] waiver authority and the [employer] Act, this Virtual Visit was conducted, with patient's consent, to reduce the patient's risk of exposure to COVID-19 and provide continuity of care for an established patient. Services were provided through a video synchronous discussion virtually to substitute for in-person appointment. \"  Documentation:  I communicated with the patient and/or health care decision maker about plan of care  Details of this discussion including any medical advice provided: Total Time: minutes: 21-30 minutes    I affirm this is a Patient Initiated Episode with an Established Patient who has not had a related appointment within my department in the past 7 days or scheduled within the next 24 hours.     Electronically signed by Josephine Bryant MD on 12/10/2021 at 8:43 AM

## 2021-12-09 ENCOUNTER — HOSPITAL ENCOUNTER (OUTPATIENT)
Dept: PAIN MANAGEMENT | Age: 36
Discharge: HOME | End: 2021-12-09
Payer: COMMERCIAL

## 2021-12-09 DIAGNOSIS — M54.2 CHRONIC NECK PAIN: ICD-10-CM

## 2021-12-09 DIAGNOSIS — G44.221 CHRONIC TENSION-TYPE HEADACHE, INTRACTABLE: ICD-10-CM

## 2021-12-09 DIAGNOSIS — Z98.1 S/P CERVICAL SPINAL FUSION: ICD-10-CM

## 2021-12-09 DIAGNOSIS — M50.30 DEGENERATIVE DISC DISEASE, CERVICAL: ICD-10-CM

## 2021-12-09 DIAGNOSIS — M54.12 CERVICAL RADICULOPATHY: Primary | ICD-10-CM

## 2021-12-09 DIAGNOSIS — M79.7 FIBROMYALGIA: ICD-10-CM

## 2021-12-09 DIAGNOSIS — G89.29 CHRONIC NECK PAIN: ICD-10-CM

## 2021-12-09 PROCEDURE — 99213 OFFICE O/P EST LOW 20 MIN: CPT

## 2021-12-09 PROCEDURE — 99213 OFFICE O/P EST LOW 20 MIN: CPT | Performed by: PAIN MEDICINE

## 2021-12-10 ASSESSMENT — ENCOUNTER SYMPTOMS
COUGH: 0
CONSTIPATION: 0
VOMITING: 0
NAUSEA: 0
SORE THROAT: 0

## 2022-01-16 NOTE — PROGRESS NOTES
NEUROLOGY FOLLOW-UP  Patient Name:  Eben Butt  :   1985  Clinic Visit Date: 2022  Last visit date: 10/19/21      Dear Dr. Tony Ragsdale MD     I saw Ms. Eben Butt in follow-up in the office today in continuation of neurologic care. As you know, she is a 29 y.o. right handed  female with chronic neck pain and migraine headaches superimposed on chronic tension type headaches. She comes to clinic stating that gabapentin was switched to pregabalin at pain clinic and it has helped well in the beginning and lately her pain has been uncontrolled. She is presently taking pregabalin 50 mg capsule 3 times daily and she is wondering about any dose change as she has been suffering from ongoing neck pain and weakness involving right side of the body. This right-sided weakness along with ongoing pain has been affecting her work. She is extremely concerned about this weakness involving right side of the body. She does have occasional shocklike sensation upon bending the neck along with associated numbness in right side of the body. She is a happier with pain clinic management but she does not have any sooner appointment. She is requesting slight increase of Lyrica until she does follow-up with pain clinic. She does have lesser tension type headaches with intermittent paroxysms of migraines on pregabalin along with Nurtec 75 qod. Her hx is significant for cervical myelopathy s/p cervical spinal fusion (2019). She does have chronic neck pain along with right shoulder pain. She also has pain involving right side of the body along with numbness on right side of the body. She has had neck pain and shoulder pain on right side radiating to Rt hand last 3 digits and also hurts on right torso and right leg and right foot. Admits to having loss of fine motor control in hands and clumsiness in Rt hand. Denied gait difficulties.      Of note; she had hx of chronic daily headaches with \"headaches for at least 15 years since teenage years\". With superimposed intermittent migrainous attacks; has been on gabapentin 400 bid, tizanidine nightly for headache prophylaxis. She is also on Maxalt alternating with butalbital for rescue therapy. She is also on Aimovig 70mg once monthly injn preventive therapy. She had suffered from neck pain with right upper extremity weakness prior to surgery in June 2019. She has had limitation of neck movement. She also admits to having intermittent pain radiating down right upper extremity. Denies bladder dysfunction but admits to having clumsiness with occasional gait disturbances with ongoing neck pain and right shoulder pain. She has tried NSAIDs with no relief. Her migraines occur with aura consisted of severe left parietotemporal throbbing pains associated with photophobia and phonophobia and nausea lasting several hours of the day. Her frequent headaches were described as a pressure like headaches with mild to moderate severity associated with the photophobia only and not associated with nausea and vomiting and able to function with many of these frequent tension type headaches. She also has had neck pain with the spasms and tizanidine has helped. Of note; she has family history of migraine headaches in her grandmother and paternal aunt and sister. She has 2 children, Guero (2011) and Emma Durbin (2006)    Medications trials: Failed Topamax, beta blockers and tricyclics (amitriptyline). Review of systems done and pertinent positives include numbness, weakness, headaches, neck pain, back pain, dizziness with pain, extreme fatigue, anxiety/depression. Denied suicidal ideations. Presently on duloxetine. Current Outpatient Medications on File Prior to Visit   Medication Sig Dispense Refill    pregabalin (LYRICA) 50 MG capsule Take 1 capsule by mouth 3 times daily for 30 days.  90 capsule 3    Rimegepant Sulfate (NURTEC) 75 MG TBDP Take one tab every other day 15 tablet 3    tiZANidine (ZANAFLEX) 4 MG tablet Take one tab nightly and half tab in am as needed for spasms 135 tablet 1    ondansetron (ZOFRAN) 4 MG tablet Take one tab every 12 hr as needed for nausea/ vomiting 40 tablet 3    cloNIDine (CATAPRES) 0.1 MG tablet Take 1 tablet by mouth twice daily. 180 tablet 1    DULoxetine (CYMBALTA) 60 MG extended release capsule Take 1 capsule by mouth daily 90 capsule 1    nortriptyline (PAMELOR) 25 MG capsule Take 1 capsule by mouth nightly 30 capsule 3    rizatriptan (MAXALT) 10 MG tablet Take 1 tablet by mouth once as needed for Migraine May repeat in 2 hours if needed 9 tablet 3    butalbital-acetaminophen-caffeine (FIORICET, ESGIC) -40 MG per tablet TAKE ONE TABLET BY MOUTH DAILY AS NEEDED FOR SEVER HEADACHE 40 tablet 0    Multiple Vitamins-Minerals (MULTIVITAMIN ADULT PO) Take by mouth daily      VITAMIN D PO Take by mouth daily      Calcium Carbonate Antacid (CALCIUM CARBONATE PO) Take by mouth daily       No current facility-administered medications on file prior to visit. Allergies: Mary Kay Wood has No Known Allergies. Past Medical History:   Diagnosis Date    Headache 2003    MIGRAINES    Hypertension 05/2019    ON RX    Psoriasis     BEHIND EARS       Past Surgical History:   Procedure Laterality Date    CERVICAL LAMINECTOMY N/A 6/24/2019    POSTERIOR CERVICAL FUSION C3-4  (GLOBUS, SSEP EVOKES CONF# [id number] - [facility]. ) performed by Marsha Watts MD at [address] [place] [address]  2007, 2014    x2    CYST REMOVAL Left 2004    breast    NECK SURGERY  06/24/2019    POSTERIOR CERVICAL FUSION C3-4      PAIN MANAGEMENT PROCEDURE N/A 1/25/2021    CERVICAL EPIDURAL STEROID INJECTION C7-T1 performed by Tamela Child MD at [facility] N/A 2/8/2021    CERVICAL EPIDURAL STEROID INJECTION C7-T1 performed by Tamela Child MD at [address] [place] [address]  2014     Social History: Mary Kay Wood reports that she has never smoked. She has never used smokeless tobacco. She reports current alcohol use. She reports that she does not use drugs. Family History   Problem Relation Age of Onset    High Blood Pressure Father     Migraines Sister     Migraines Paternal Grandmother     Heart Disease Paternal Grandmother     Cancer Paternal Grandfather         brain     On exam: Vitals: Blood pressure 120/79, pulse 90, temperature 96.6 °F (35.9 °C), height 5' 4\" (1.626 m), weight 117 lb (53.1 kg), not currently breastfeeding. GENERAL  Appears uncomfortable and in distress with ongoing right-sided weakness with pain. HEENT  presently wearing cervical collar   NECK  Supple and no bruits heard   MENTAL STATUS:  Alert, oriented x3, followed three-step commands well; good naming and good repetition; able to do subtractions well; no dysarthria; normal language; no hallucinations or delusions. CRANIAL NERVES: II     -      PERRLA; VA 20/20 OU., Visual fields intact to confrontation  III,IV,VI -  EOMs full, no afferent defect, no  KEHINDE, no ptosis  V     -     Normal facial sensation  VII    -     Normal facial symmetry  VIII   -     Intact hearing  IX,X -     Symmetrical palate  XI    -     Symmetrical shoulder shrug  XII   -     Midline tongue, no atrophy    MOTOR FUNCTION:  significant for giveaway weakness of grade 4+/5 in right upper and right lower extremities; 5/5 in left upper and left lower extremities; within normal bulk, normal tone and no abnormal involuntary movements and no tremors. Parks's sign negative. SENSORY FUNCTION:  Normal touch, normal pin prick in all 4 extremities. CEREBELLAR FUNCTION:  Intact fine motor control over upper limbs   REFLEX FUNCTION:  Symmetric and brisk DTRs with no parks sign; no Babinski sign   STATION and GAIT  Normal station, ambulates without any assistive device. No ataxia noted.          Diagnostic data reviewed with the patient:   MRI Brain (w/wo) (1/31/17): No intracranial abnormality. Normal MRI of the brain. CBC:     Lab Results   Component Value Date    WBC 6.7 08/11/2020    HGB 14.2 08/11/2020     08/11/2020      BMP:     Lab Results   Component Value Date     08/11/2020    K 4.5 08/11/2020    GLUCOSE 98 08/11/2020    CALCIUM 10.0 08/11/2020         Lab Results   Component Value Date    CHOL 199 08/11/2020    LDLCHOLESTEROL 100 08/11/2020    HDL 66 08/11/2020    TRIG 167 (H) 08/11/2020    ALT 11 08/11/2020    AST 17 08/11/2020    TSH 3.19 08/11/2020    LABA1C 4.8 08/11/2020     MRI cervical spine 1/18/19: Multiple levels with small disc protrusions in cervical spine without significant canal stenosis or neural foraminal narrowing. No demyelinating plaques; No abnl enhancement with IV contrast.    EMG Rt UE (1/24/19): There is electrophysiologic evidence of mild right C6/C7 cervical radiculopathy. This study also demonstrated subtle median neuropathy at right wrist suggestive of right carpal tunnel syndrome of mild severity. MRI Cervical Spine (w/wo) 11/5/2020: Status post posterior instrumented fusion of C3-4. Degenerative disc disease without spinal canal stenosis or foraminal stenosis. No abnormal signal in cervical spinal cord. No abnormal enhancement of the spinal cord. Impression and Plan: Ms. Trinidad Cleveland is a 29 y.o. female with   Progressive right-sided weakness involving right arm and right leg associated with intractable neck pain and intermittent lower back pain along with pain involving right lower extremity: will get MRI brain (with/without) and also to optimize the dose of pregabalin slightly from 150/day to 200/day.,    Chronic and intractable neck pain: Has upcoming follow-up visit with pain clinic. Concerns about ability to work with ongoing right-sided weakness: to get PT eval for functional capacity evaluation.   Chronic tension type headaches with superimposed migrainous attacks: Marginally controlled; increasing the dose of pregabalin might certainly help. Also to continue Nurtec 75 qod along with Zofran for migraine headache prophylaxis. Cervical myelopathy s/p cervical spinal fusion (6/24/2019). Follow-up in clinic in 3 months. This note was partially created using voice recognition software and is inherently subject to errors including those of syntax and \"sound alike\" substitutions which may escape proofreading. In such instances, original meaning may be extrapolated by contextual derivation.

## 2022-01-17 ENCOUNTER — OFFICE VISIT (OUTPATIENT)
Dept: NEUROLOGY | Age: 37
End: 2022-01-17
Payer: COMMERCIAL

## 2022-01-17 VITALS
TEMPERATURE: 96.6 F | SYSTOLIC BLOOD PRESSURE: 120 MMHG | BODY MASS INDEX: 19.97 KG/M2 | HEART RATE: 90 BPM | HEIGHT: 64 IN | WEIGHT: 117 LBS | DIASTOLIC BLOOD PRESSURE: 79 MMHG

## 2022-01-17 DIAGNOSIS — G44.221 CHRONIC TENSION-TYPE HEADACHE, INTRACTABLE: ICD-10-CM

## 2022-01-17 DIAGNOSIS — R53.1 RIGHT SIDED WEAKNESS: Primary | ICD-10-CM

## 2022-01-17 DIAGNOSIS — R52 INTRACTABLE PAIN: ICD-10-CM

## 2022-01-17 DIAGNOSIS — G43.119 INTRACTABLE MIGRAINE WITH AURA WITHOUT STATUS MIGRAINOSUS: ICD-10-CM

## 2022-01-17 PROCEDURE — 99214 OFFICE O/P EST MOD 30 MIN: CPT | Performed by: PSYCHIATRY & NEUROLOGY

## 2022-01-17 RX ORDER — PREGABALIN 100 MG/1
CAPSULE ORAL
Qty: 60 CAPSULE | Refills: 3 | Status: SHIPPED | OUTPATIENT
Start: 2022-01-28 | End: 2022-04-21 | Stop reason: SDUPTHER

## 2022-01-17 RX ORDER — RIMEGEPANT SULFATE 75 MG/75MG
TABLET, ORALLY DISINTEGRATING ORAL
Qty: 15 TABLET | Refills: 3 | Status: SHIPPED | OUTPATIENT
Start: 2022-01-17 | End: 2022-04-21 | Stop reason: SDUPTHER

## 2022-01-17 NOTE — PROGRESS NOTES
Constitutional [] Weight loss/gain   [x] Fatigue  [] Fever/Chills   HEENT [] Hearing Loss  [x] Visual Disturbance  [] Tinnitus  [x] Ear pain   Respiratory [] Shortness of Breath  [] Cough  [] Snoring   Cardiovascular [x] Chest Pain-right  [] Palpitations  [x] Lightheaded   GI [] Constipation  [] Diarrhea  [] Swallowing change  [] Nausea/vomiting    [] Urinary Frequency  [] Urinary Urgency   Musculoskeletal [x] Neck pain  [x] Back pain  [x] Muscle pain  [] Restless legs   Dermatologic [] Skin changes   Neurologic [] Memory loss/confusion  [] Seizures  [x] Trouble walking or imbalance  [x] Dizziness  [x] Sleep disturbance  [x] Weakness  [x] Numbness  [] Tremors  [] Speech Difficulty  [x] Headaches  [x] Light Sensitivity  [x] Sound Sensitivity   Endocrinology []Excessive thirst  []Excessive hunger   Psychiatric [x] Anxiety/Depression  [] Hallucination   Allergy/immunology []Hives/environmental allergies   Hematologic/lymph [] Abnormal bleeding  [] Abnormal bruising

## 2022-01-24 RX ORDER — DULOXETIN HYDROCHLORIDE 60 MG/1
60 CAPSULE, DELAYED RELEASE ORAL DAILY
Qty: 90 CAPSULE | Refills: 1 | OUTPATIENT
Start: 2022-01-24

## 2022-01-24 NOTE — TELEPHONE ENCOUNTER
Haydee Navarro is calling to request a refill on the following medication(s):    Medication Request:      Requested Prescriptions     Refused Prescriptions Disp Refills    DULoxetine (CYMBALTA) 60 MG extended release capsule [Pharmacy Med Name: Duloxetine 60mg Delayed-Release Capsule] 90 capsule 1     Sig: Take 1 capsule by mouth daily.      Refused By: Jeanne Henriquez     Reason for Refusal: Patient has requested refill too soon       Last Visit Date (If Applicable):  3/22/4956    Next Visit Date:    3/11/2022

## 2022-01-26 ENCOUNTER — TELEPHONE (OUTPATIENT)
Dept: NEUROLOGY | Age: 37
End: 2022-01-26

## 2022-01-26 RX ORDER — DULOXETIN HYDROCHLORIDE 60 MG/1
60 CAPSULE, DELAYED RELEASE ORAL DAILY
Qty: 90 CAPSULE | Refills: 1 | OUTPATIENT
Start: 2022-01-26

## 2022-01-26 NOTE — TELEPHONE ENCOUNTER
Roxi Mir is calling to request a refill on the following medication(s):    Medication Request:  Requested Prescriptions     Pending Prescriptions Disp Refills    DULoxetine (CYMBALTA) 60 MG extended release capsule 90 capsule 1     Sig: Take 1 capsule by mouth daily     Last filled 9/16/21 90 day 1 refill, not due    Last Visit Date (If Applicable):  5/45/8775    Next Visit Date:    3/11/2022

## 2022-01-28 ENCOUNTER — HOSPITAL ENCOUNTER (OUTPATIENT)
Dept: MRI IMAGING | Facility: CLINIC | Age: 37
Discharge: HOME | End: 2022-01-30
Payer: COMMERCIAL

## 2022-01-28 DIAGNOSIS — R53.1 RIGHT SIDED WEAKNESS: ICD-10-CM

## 2022-01-28 LAB — POC CREATININE: 0.9 MG/DL (ref 0.6–1.4)

## 2022-01-28 PROCEDURE — 6360000004 HC RX CONTRAST MEDICATION: Performed by: PSYCHIATRY & NEUROLOGY

## 2022-01-28 PROCEDURE — 70553 MRI BRAIN STEM W/O & W/DYE: CPT

## 2022-01-28 PROCEDURE — 82565 ASSAY OF CREATININE: CPT

## 2022-01-28 PROCEDURE — A9579 GAD-BASE MR CONTRAST NOS,1ML: HCPCS | Performed by: PSYCHIATRY & NEUROLOGY

## 2022-01-28 RX ADMIN — GADOTERIDOL 10 ML: 279.3 INJECTION, SOLUTION INTRAVENOUS at 10:52

## 2022-03-28 RX ORDER — CLONIDINE HYDROCHLORIDE 0.1 MG/1
TABLET ORAL
Qty: 30 TABLET | Refills: 0 | Status: SHIPPED | OUTPATIENT
Start: 2022-03-28 | End: 2022-04-21 | Stop reason: SDUPTHER

## 2022-03-28 NOTE — TELEPHONE ENCOUNTER
Emma Arreguin is calling to request a refill on the following medication(s):    Medication Request:    Last filled 9/22/21 #180 with 1 RF  Pended with 0 RF, due for appt    Requested Prescriptions     Pending Prescriptions Disp Refills    cloNIDine (CATAPRES) 0.1 MG tablet [Pharmacy Med Name: Clonidine Hydrochloride 0.1mg Tablet] 180 tablet 1     Sig: Take 1 tablet by mouth twice daily.        Last Visit Date (If Applicable):  9/32/2986    Next Visit Date:    Visit date not found

## 2022-04-20 DIAGNOSIS — R52 INTRACTABLE PAIN: ICD-10-CM

## 2022-04-20 RX ORDER — PREGABALIN 100 MG/1
CAPSULE ORAL
Qty: 60 CAPSULE | Refills: 2 | OUTPATIENT
Start: 2022-04-20

## 2022-04-20 NOTE — PROGRESS NOTES
NEUROLOGY FOLLOW-UP  Patient Name:  Suzanne Bustamante  :   1985  Clinic Visit Date: 2022  Last visit date: 2022         Dear Dr. Jazz Greenberg MD     I saw Ms. Suzanne Bustamante in follow-up in the office today in continuation of neurologic care. As you know, she is a 40 y.o. right handed  female with chronic neck pain and migraine headaches superimposed on chronic tension type headaches. She comes to clinic stating that she has ongoing right-sided weakness. She comes to clinic to follow-up on brain MRI. She recalls being told that it is unremarkable. She is frustrated that she has ongoing chronic right-sided weakness. She also has intermittent migrainous headaches along with neck pain. She was followed at the pain clinic and she was on gabapentin and it was switched to pregabalin. She has been requesting refills for pregabalin. She has been taking pregabalin for ongoing severe neck pain and neuropathy pain with pins-and-needles as well. She is requesting refills on Nurtec as it has been helpful for migraine headaches. Also takes tizanidine to help for muscle spasms. She is extremely frustrated with ongoing right-sided weakness and \"no unable to find out the reason and help me\". She admits to being referred back to orthopedic physician and also having had MRI brain and MRI cervical spine studies along with EMG of upper extremities and those were unrevealing. Her hx is significant for cervical myelopathy s/p cervical spinal fusion (2019). She does have chronic neck pain along with right shoulder pain. She also has pain involving right side of the body along with numbness on right side of the body. She has had neck pain and shoulder pain on right side radiating to Rt hand last 3 digits and also hurts on right torso and right leg and right foot. Admits to having loss of fine motor control in hands and clumsiness in Rt hand. Denied gait difficulties.      Of note; she had hx of chronic daily headaches with \"headaches for at least 15 years since teenage years\". With superimposed intermittent migrainous attacks; has been on gabapentin 400 bid, tizanidine nightly for headache prophylaxis. She is also on Maxalt alternating with butalbital for rescue therapy. She is also on Aimovig 70mg once monthly injn preventive therapy. She had suffered from neck pain with right upper extremity weakness prior to surgery in June 2019. She has had limitation of neck movement. She also admits to having intermittent pain radiating down right upper extremity. Denies bladder dysfunction but admits to having clumsiness with occasional gait disturbances with ongoing neck pain and right shoulder pain. She has tried NSAIDs with no relief. Her migraines occur with aura consisted of severe left parietotemporal throbbing pains associated with photophobia and phonophobia and nausea lasting several hours of the day. Her frequent headaches were described as a pressure like headaches with mild to moderate severity associated with the photophobia only and not associated with nausea and vomiting and able to function with many of these frequent tension type headaches. She also has had neck pain with the spasms and tizanidine has helped. Of note; she has family history of migraine headaches in her grandmother and paternal aunt and sister. She has 2 children, Guero (2011) and Max Simental (2006)    Medications trials: Failed Topamax, beta blockers and tricyclics (amitriptyline). All items selected indicate a positive finding. Those items not selected are negative.   Constitutional [] Weight loss/gain   [] Fatigue  [] Fever/Chills   HEENT [] Hearing Loss  [] Visual Disturbance  [] Tinnitus  [] Eye pain   Respiratory [] Shortness of Breath  [] Cough  [] Snoring   Cardiovascular [] Chest Pain  [] Palpitations  [] Lightheaded   GI [] Constipation  [] Diarrhea  [] Swallowing change    [] Urinary Frequency  [] Urinary Urgency   Musculoskeletal [x] Neck pain  [] Back pain  [] Muscle pain  [] Restless legs   Dermatologic [] Skin changes   Neurologic [] Memory loss/confusion  [] Seizures  [] Trouble walking or imbalance  [] Dizziness  [x] Weakness  [] Numbness  [] Tremors  [] Speech Difficulty  [x] Headaches  [x] Light Sensitivity  [x] Sound Sensitivity   Endocrinology []Excessive thirst  []Excessive hunger   Psychiatric [] Anxiety/Depression  [] Hallucination   Allergy/immunology []Hives/environmental allergies   Hematologic/lymph [] Abnormal bleeding  [] Abnormal bruising         Current Outpatient Medications on File Prior to Visit   Medication Sig Dispense Refill    cloNIDine (CATAPRES) 0.1 MG tablet Take 1 tablet by mouth twice daily. 30 tablet 0    pregabalin (LYRICA) 100 MG capsule TAKE ONE CAP TWICE DAILY STARTING JAN 28TH 60 capsule 3    Rimegepant Sulfate (NURTEC) 75 MG TBDP Take one tab every other day 15 tablet 3    tiZANidine (ZANAFLEX) 4 MG tablet Take one tab nightly and half tab in am as needed for spasms 135 tablet 1    ondansetron (ZOFRAN) 4 MG tablet Take one tab every 12 hr as needed for nausea/ vomiting 40 tablet 3    DULoxetine (CYMBALTA) 60 MG extended release capsule Take 1 capsule by mouth daily 90 capsule 1    nortriptyline (PAMELOR) 25 MG capsule Take 1 capsule by mouth nightly 30 capsule 3    rizatriptan (MAXALT) 10 MG tablet Take 1 tablet by mouth once as needed for Migraine May repeat in 2 hours if needed 9 tablet 3    butalbital-acetaminophen-caffeine (FIORICET, ESGIC) -40 MG per tablet TAKE ONE TABLET BY MOUTH DAILY AS NEEDED FOR SEVER HEADACHE 40 tablet 0    Multiple Vitamins-Minerals (MULTIVITAMIN ADULT PO) Take by mouth daily      VITAMIN D PO Take by mouth daily      Calcium Carbonate Antacid (CALCIUM CARBONATE PO) Take by mouth daily       No current facility-administered medications on file prior to visit.      Allergies: Jessica Vipul has No Known Allergies. Past Medical History:   Diagnosis Date    Headache 2003    MIGRAINES    Hypertension 05/2019    ON RX    Psoriasis     BEHIND EARS       Past Surgical History:   Procedure Laterality Date    CERVICAL LAMINECTOMY N/A 6/24/2019    POSTERIOR CERVICAL FUSION C3-4  (GLOBUS, SSEP [facility]OKES CONF# [id number] - [facility]. ) performed by Keith Sullivan MD at [facility] 3  2007, 2014    x2    CYST REMOVAL Left 2004    breast    NECK SURGERY  06/24/2019    POSTERIOR CERVICAL FUSION C3-4      PAIN MANAGEMENT PROCEDURE N/A 1/25/2021    CERVICAL EPIDURAL STEROID INJECTION C7-T1 performed by Rikki Croft MD at [address] [place] [address] N/A 2/8/2021    CERVICAL EPIDURAL STEROID INJECTION C7-T1 performed by [facility]ikki Croft MD at [facility]  2014     Social History: Melyssa Last  reports that she has never smoked. She has never used smokeless tobacco. She reports current alcohol use. She reports that she does not use drugs. Family History   Problem Relation Age of Onset    High Blood Pressure Father     Migraines Sister     Migraines Paternal Grandmother     Heart Disease Paternal Grandmother     Cancer Paternal Grandfather         brain     On exam: Blood pressure 123/83, pulse 73, height 5' 4\" (1.626 m), weight 110 lb (49.9 kg), not currently breastfeeding. GENERAL  Appears comfortable and in no distress   HEENT  NC/AT    NECK  Supple and no bruits heard   MENTAL STATUS:  Alert, oriented x3, followed three-step commands well; good naming and good repetition; able to do subtractions well; no dysarthria; normal language; no hallucinations or delusions.      CRANIAL NERVES: II     -      PERRLA; VA 20/20 OU., Visual fields intact to confrontation  III,IV,VI -  EOMs full, no afferent defect, no  KEHINDE, no ptosis  V     -     Normal facial sensation  VII    -     Normal facial symmetry  VIII   -     Intact hearing  IX,X -     Symmetrical palate  XI    - Symmetrical shoulder shrug  XII   -     Midline tongue, no atrophy    MOTOR FUNCTION:  significant for giveaway weakness of grade 4+/5 in right upper and right lower extremities; 5/5 in left upper and left lower extremities; within normal bulk, normal tone and no abnormal involuntary movements and no tremors. Parks's sign negative. SENSORY FUNCTION:  Normal touch, normal pin prick in all 4 extremities. CEREBELLAR FUNCTION:  Intact fine motor control over upper limbs   REFLEX FUNCTION:  Symmetric and brisk DTRs with no parks sign; no Babinski sign   STATION and GAIT  Normal station, ambulates without any assistive device. No ataxia noted. Diagnostic data reviewed with the patient:   MRI Brain (w/wo) (1/31/17): No intracranial abnormality. Normal MRI of the brain. CBC:     Lab Results   Component Value Date    WBC 6.7 08/11/2020    HGB 14.2 08/11/2020     08/11/2020      BMP:     Lab Results   Component Value Date     08/11/2020    K 4.5 08/11/2020    GLUCOSE 98 08/11/2020    CALCIUM 10.0 08/11/2020         Lab Results   Component Value Date    CHOL 199 08/11/2020    LDLCHOLESTEROL 100 08/11/2020    HDL 66 08/11/2020    TRIG 167 (H) 08/11/2020    ALT 11 08/11/2020    AST 17 08/11/2020    TSH 3.19 08/11/2020    LABA1C 4.8 08/11/2020     MRI cervical spine 1/18/19: Multiple levels with small disc protrusions in cervical spine without significant canal stenosis or neural foraminal narrowing. No demyelinating plaques; No abnl enhancement with IV contrast.    EMG Rt UE (1/24/19): There is electrophysiologic evidence of mild right C6/C7 cervical radiculopathy. This study also demonstrated subtle median neuropathy at right wrist suggestive of right carpal tunnel syndrome of mild severity. MRI Cervical Spine (w/wo) 11/5/2020: Status post posterior instrumented fusion of C3-4. Degenerative disc disease without spinal canal stenosis or foraminal stenosis.   No abnormal signal in cervical spinal cord. No abnormal enhancement of the spinal cord. Impression and Plan: Ms. Jennifer Amezcua is a 40 y.o. female with   Right-sided weakness with chronic neck pain; s/p cervical spinal fusion and (06/2019); ? cervical spondylotic myelopathy but no evidence of cord compression on MRI; for second opinion consultation, at her request; she has been referred to [facility]. Pregabalin and tizanidine scripts given at her request.     Chronic tension type headaches with superimposed migrainous attacks: Tolerable; to continue Nurtec 75 qod along with Zofran for migraine headache prophylaxis. Medication counseling done. Follow-up in clinic in 2-3 months. This note was partially created using voice recognition software and is inherently subject to errors including those of syntax and \"sound alike\" substitutions which may escape proofreading. In such instances, original meaning may be extrapolated by contextual derivation.

## 2022-04-21 ENCOUNTER — OFFICE VISIT (OUTPATIENT)
Dept: NEUROLOGY | Age: 37
End: 2022-04-21
Payer: COMMERCIAL

## 2022-04-21 VITALS
WEIGHT: 110 LBS | BODY MASS INDEX: 18.78 KG/M2 | HEIGHT: 64 IN | SYSTOLIC BLOOD PRESSURE: 123 MMHG | DIASTOLIC BLOOD PRESSURE: 83 MMHG | HEART RATE: 73 BPM

## 2022-04-21 DIAGNOSIS — G43.119 INTRACTABLE MIGRAINE WITH AURA WITHOUT STATUS MIGRAINOSUS: ICD-10-CM

## 2022-04-21 DIAGNOSIS — G89.29 CHRONIC NECK PAIN: ICD-10-CM

## 2022-04-21 DIAGNOSIS — M54.2 CHRONIC NECK PAIN: ICD-10-CM

## 2022-04-21 DIAGNOSIS — G44.221 CHRONIC TENSION-TYPE HEADACHE, INTRACTABLE: ICD-10-CM

## 2022-04-21 DIAGNOSIS — R52 INTRACTABLE PAIN: ICD-10-CM

## 2022-04-21 DIAGNOSIS — M62.838 MUSCLE SPASM: ICD-10-CM

## 2022-04-21 DIAGNOSIS — R53.1 RIGHT SIDED WEAKNESS: Primary | ICD-10-CM

## 2022-04-21 PROCEDURE — 99214 OFFICE O/P EST MOD 30 MIN: CPT | Performed by: PSYCHIATRY & NEUROLOGY

## 2022-04-21 RX ORDER — CLONIDINE HYDROCHLORIDE 0.1 MG/1
0.1 TABLET ORAL 2 TIMES DAILY
Qty: 30 TABLET | Refills: 0 | Status: SHIPPED | OUTPATIENT
Start: 2022-04-21

## 2022-04-21 RX ORDER — RIMEGEPANT SULFATE 75 MG/75MG
TABLET, ORALLY DISINTEGRATING ORAL
Qty: 15 TABLET | Refills: 3 | Status: SHIPPED | OUTPATIENT
Start: 2022-04-21 | End: 2022-07-25 | Stop reason: SDUPTHER

## 2022-04-21 RX ORDER — PREGABALIN 100 MG/1
CAPSULE ORAL
Qty: 60 CAPSULE | Refills: 3 | Status: SHIPPED | OUTPATIENT
Start: 2022-04-21 | End: 2022-07-25 | Stop reason: SDUPTHER

## 2022-04-21 RX ORDER — TIZANIDINE 4 MG/1
TABLET ORAL
Qty: 135 TABLET | Refills: 1 | Status: SHIPPED | OUTPATIENT
Start: 2022-04-21 | End: 2022-10-20

## 2022-04-21 RX ORDER — ONDANSETRON 4 MG/1
TABLET, FILM COATED ORAL
Qty: 40 TABLET | Refills: 3 | Status: SHIPPED | OUTPATIENT
Start: 2022-04-21 | End: 2023-04-21

## 2022-04-21 NOTE — TELEPHONE ENCOUNTER
Zachary Alegria is calling to request a refill on the following medication(s):    Medication Request:  Requested Prescriptions     Pending Prescriptions Disp Refills    cloNIDine (CATAPRES) 0.1 MG tablet 90 tablet 0       Last Visit Date (If Applicable):  4/82/6235    Next Visit Date:    Visit date not found

## 2022-07-25 ENCOUNTER — OFFICE VISIT (OUTPATIENT)
Dept: NEUROLOGY | Age: 37
End: 2022-07-25
Payer: COMMERCIAL

## 2022-07-25 ENCOUNTER — TELEPHONE (OUTPATIENT)
Dept: NEUROLOGY | Age: 37
End: 2022-07-25

## 2022-07-25 VITALS
WEIGHT: 105.2 LBS | HEART RATE: 68 BPM | HEIGHT: 64 IN | SYSTOLIC BLOOD PRESSURE: 135 MMHG | BODY MASS INDEX: 17.96 KG/M2 | DIASTOLIC BLOOD PRESSURE: 94 MMHG

## 2022-07-25 DIAGNOSIS — R52 INTRACTABLE PAIN: ICD-10-CM

## 2022-07-25 DIAGNOSIS — G44.221 CHRONIC TENSION-TYPE HEADACHE, INTRACTABLE: ICD-10-CM

## 2022-07-25 DIAGNOSIS — M54.2 CHRONIC NECK PAIN: ICD-10-CM

## 2022-07-25 DIAGNOSIS — G43.719 INTRACTABLE CHRONIC MIGRAINE WITHOUT AURA AND WITHOUT STATUS MIGRAINOSUS: Primary | ICD-10-CM

## 2022-07-25 DIAGNOSIS — G89.29 CHRONIC NECK PAIN: ICD-10-CM

## 2022-07-25 DIAGNOSIS — M54.12 CERVICAL RADICULOPATHY: ICD-10-CM

## 2022-07-25 PROCEDURE — 99214 OFFICE O/P EST MOD 30 MIN: CPT | Performed by: NURSE PRACTITIONER

## 2022-07-25 RX ORDER — PREGABALIN 100 MG/1
CAPSULE ORAL
Qty: 60 CAPSULE | Refills: 3 | Status: SHIPPED | OUTPATIENT
Start: 2022-07-25 | End: 2022-12-12

## 2022-07-25 RX ORDER — RIMEGEPANT SULFATE 75 MG/75MG
TABLET, ORALLY DISINTEGRATING ORAL
Qty: 15 TABLET | Refills: 3 | Status: SHIPPED | OUTPATIENT
Start: 2022-07-25

## 2022-07-25 NOTE — PROGRESS NOTES
Marital status:      Spouse name: Not on file    Number of children: Not on file    Years of education: Not on file    Highest education level: Not on file   Occupational History    Not on file   Tobacco Use    Smoking status: Never    Smokeless tobacco: Never   Vaping Use    Vaping Use: Never used   Substance and Sexual Activity    Alcohol use:  Yes     Alcohol/week: 0.0 standard drinks     Comment: MIXED DRINKS - twice a year    Drug use: No    Sexual activity: Yes     Partners: Male   Other Topics Concern    Not on file   Social History Narrative    Not on file     Social Determinants of Health     Financial Resource Strain: Low Risk     Difficulty of Paying Living Expenses: Not hard at all   Food Insecurity: No Food Insecurity    Worried About Running Out of Food in the Last Year: Never true    Ran Out of Food in the Last Year: Never true   Transportation Needs: Not on file   Physical Activity: Not on file   Stress: Not on file   Social Connections: Not on file   Intimate Partner Violence: Not on file   Housing Stability: Not on file       CURRENT MEDICATIONS:     Current Outpatient Medications   Medication Sig Dispense Refill    pregabalin (LYRICA) 100 MG capsule TAKE ONE CAP TWICE DAILY 60 capsule 3    Rimegepant Sulfate (NURTEC) 75 MG TBDP Take one tab every other day 15 tablet 3    cloNIDine (CATAPRES) 0.1 MG tablet Take 1 tablet by mouth 2 times daily 30 tablet 0    ondansetron (ZOFRAN) 4 MG tablet Take one tab every 12 hr as needed for nausea/ vomiting 40 tablet 3    tiZANidine (ZANAFLEX) 4 MG tablet Take one tab nightly and half tab in am as needed for spasms 135 tablet 1    butalbital-acetaminophen-caffeine (FIORICET, ESGIC) -40 MG per tablet TAKE ONE TABLET BY MOUTH DAILY AS NEEDED FOR SEVER HEADACHE 40 tablet 0    Multiple Vitamins-Minerals (MULTIVITAMIN ADULT PO) Take by mouth daily      VITAMIN D PO Take by mouth daily      Calcium Carbonate Antacid (CALCIUM CARBONATE PO) Take by mouth daily       No current facility-administered medications for this visit. ALLERGIES:   No Known Allergies                              REVIEW OF SYSTEMS        All items selected indicate a positive finding. Those items not selected are negative.   Constitutional [] Weight loss/gain   [] Fatigue  [] Fever/Chills   HEENT [] Hearing Loss  [] Visual Disturbance  [] Tinnitus  [] Eye pain   Respiratory [] Shortness of Breath  [] Cough  [] Snoring   Cardiovascular [] Chest Pain  [] Palpitations  [] Lightheaded   GI [] Constipation  [] Diarrhea  [] Swallowing change  [x] Nausea/vomiting    [] Urinary Frequency  [] Urinary Urgency   Musculoskeletal [x] Neck pain  [] Back pain  [x] Muscle pain  [] Restless legs   Dermatologic [] Skin changes   Neurologic [] Memory loss/confusion  [] Seizures  [] Trouble walking or imbalance  [] Dizziness  [] Sleep disturbance  [] Weakness  [] Numbness  [] Tremors  [] Speech Difficulty  [x] Headaches  [x] Light Sensitivity  [x] Sound Sensitivity   Endocrinology []Excessive thirst  []Excessive hunger   Psychiatric [] Anxiety/Depression  [] Hallucination   Allergy/immunology []Hives/environmental allergies   Hematologic/lymph [] Abnormal bleeding  [] Abnormal bruising         PHYSICAL EXAMINATION:       Vitals:    07/25/22 1559   BP: (!) 135/94   Pulse: 68                                              .                                                                                                    General Appearance:  Alert, cooperative, no signs of distress, appears stated age   Head:  Normocephalic, no signs of trauma   Eyes:  Conjunctiva/corneas clear;  eyelids intact   Ears:  Normal external ear and canals   Nose: Nares normal, mucosa normal, no drainage    Throat: Lips and tongue normal; teeth normal;  gums normal   Neck: Supple, intact flexion, extension and rotation;   trachea midline;  no adenopathy;   thyroid: not enlarged;   no carotid pulse abnormality   Back: Symmetric, no curvature, ROM adequate   Lungs:   Respirations unlabored   Heart:  Regular rate and rhythm           Extremities: Extremities normal, no cyanosis, no edema   Pulses: Symmetric over head and neck   Skin: Skin color, texture normal, no rashes, no lesions                                     NEUROLOGIC EXAMINATION    Neurologic Exam  Mental status    Alert and oriented x 3; intact memory with no confusion, speech or language problems; no hallucinations or delusions  Fund of information appropriate for level of education    Cranial nerves    II - visual fields intact to confrontation bilaterally  III, IV, VI - extra-ocular muscles full: no pupillary defect; no KEHINDE, no nystagmus, no ptosis   V - normal facial sensation                                                               VII - normal facial symmetry                                                             VIII - intact hearing                                                                             IX, X - symmetrical palate                                                                  XI - symmetrical shoulder shrug                                                       XII - tongue midline without atrophy or fasciculation      Motor function  Normal muscle bulk and tone; strength 5/5 on all 4 extremities, no pronator drift      Sensory function Intact to light touch, pinprick, vibration, proprioception on all 4 extremities      Cerebellar Intact fine motor movement. No involuntary movements or tremors. No ataxia or dysmetria on finger to nose or heel to shin testing      Reflex function DTR 2+ on bilateral UE and LE, symmetric. Down going toes bilaterally      Gait                   normal base and arm swing                  Medical Decision Making:        In summary, your patient, Bhargav Polo exhibits the following, with associated plan:    Chronic neck pain status post cervical fusion in June 2019 with continued right sided weakness and facial numbness. She reports that the facial numbness is typically worsened by a headache but can also be present without the headache. Obtain EMG as ordered by the Chrissy 150 100 mg twice daily  Continue zanaflex 2 mg in the morning and 4 mg at bedtime daily as needed   Chronic migraine headaches currently getting 16 headache days/month. Previous medication trials include topamax, amitriptyline, pamelor, ajovy and emgality. The patient has failed at least 2 prophylactic medications, she has headaches lasting more than 4 hours a day and has more than 15 headache days in a month. In my opinion, the patient would be a good candidate for Botox injections for her migraines. The patient meets the diagnosis for chronic migraine without aura, intractable, with status migrainosus (G43.711)  Continue nurtec 75 mg every other day   The patient will be seen back in the near future for botox injections  Return for follow up visit in 3 months             Signed: Damir Pickens CNP      *Please note that portions of this note were completed with a voice recognition program.  Although every effort was made to insure the accuracy of this automated transcription, some errors in transcription may have occurred, occasionally words and are mis-transcribed    Provider Attestation: The documentation recorded by the scribe accurately reflects the service I personally performed and the decisions made by myself. Portions of this note were transcribed by a scribe. I personally performed the history, physical exam, and medical decision-making and confirm the accuracy of the information in the transcribed note. Scribe Attestation:   By signing my name below, Kevyn Hamilton, attest that this documentation has been prepared under the direction and in the presence of Damir Pickens CNP.

## 2022-08-15 ENCOUNTER — PROCEDURE VISIT (OUTPATIENT)
Dept: PHYSICAL MEDICINE AND REHAB | Age: 37
End: 2022-08-15
Payer: COMMERCIAL

## 2022-08-15 DIAGNOSIS — R20.2 PARESTHESIA OF RIGHT ARM AND LEG: ICD-10-CM

## 2022-08-15 DIAGNOSIS — R53.1 RIGHT SIDED WEAKNESS: Primary | ICD-10-CM

## 2022-08-15 PROCEDURE — 95886 MUSC TEST DONE W/N TEST COMP: CPT | Performed by: STUDENT IN AN ORGANIZED HEALTH CARE EDUCATION/TRAINING PROGRAM

## 2022-08-15 PROCEDURE — 95912 NRV CNDJ TEST 11-12 STUDIES: CPT | Performed by: STUDENT IN AN ORGANIZED HEALTH CARE EDUCATION/TRAINING PROGRAM

## 2022-08-22 NOTE — PROGRESS NOTES
[address] [facility],[address] PHYSICAL MEDICINE AND REHABILITATION  [address] [facility] [address]  [address] [place] [address]  Dept: [phone]  Dept Fax: [phone]    EMG/NCS Right Upper and Lower Limbs    Subjective:     Madie Levy is a 40y.o.-year-old right-handed female presenting with right upper limb weakness and numbness/tingling for about 1.5 years. She localizes the paresthesias to the lateral right upper limb and the 3rd-5th digits of the right hand as well as to the lateral right foot, affecting digits 3-5. She notes neck pain and low back pain with radiation to the posterior and lateral right lower limb. She denies any symptoms in the left upper and lower limbs. She has a history of right cervical radiculopathy s/p cervical fusion in 2019. She states that she had right upper limb numbness before the surgery which improved for a few months after the surgery but then returned. PMH:  no diabetes, no thyroid disease    PSH:  +cervical fusion, no hand surgery, no back surgery    Objective:     Physical Exam    Constitutional: She appears well-developed and well-nourished. In no distress. Pulmonary/Chest: Respirations WNL and unlabored. Neurological: She is alert. Sensation to light touch decreased at the right proximal lateral upper limb, at the right lateral elbow, and at the 3rd and 5th digits of the right hand. Sensation to light touch decreased at the right lateral ankle and right lateral foot. Strength 4/5 in right upper and lower limbs. Strength 4+/5 in left upper and lower limbs. Negative Parks's reflex bilaterally. Positive Tinel sign at the right wrist and negative Tinel sign at the right medial elbow. No clonus with passive ankle dorsiflexion bilaterally. Skin: Skin is warm and dry with good turgor. Imaging  MRI brain, 1/28/22:  Impression   Unremarkable MRI of the brain. MRI cervical spine, 11/5/2020:   Impression   Status post posterior instrumented fusion of C3-4. Degenerative disc disease without spinal canal or foraminal stenosis. No abnormal signal or enhancement in the cervical spinal cord. Findings:     The procedure was explained to the patient prior to beginning the test.  Risks and benefits of the procedure were discussed. Patient gave verbal consent to proceed. The right median sensory studies to digits 1 and 3 are normal.  The right radial sensory study to digit 1 is normal.  The right ulnar sensory study to digit 5 is normal.  The right median motor study to APB is normal.  The right ulnar motor study to ADM shows normal latency and amplitude; however, there is a decrease in conduction velocity from distal to proximal stimulation. The right superficial peroneal/fibular sensory study is normal.  The right sural sensory study is normal.  The right peroneal/fibular motor study to EDB is normal.  The right tibial motor study to AHB is normal.    EMG of selected muscles of the right upper limb is normal.    EMG of selected muscles of the right lower limb is normal, except the right medial gastrocnemius muscle was difficult to recruit. Impression:     Abnormal electrodiagnostic study. There is evidence suggestive of a mild right ulnar neuropathy at the elbow, which is difficult to specifically localize to the retrocondylar area or cubital tunnel. There is no evidence of right median neuropathy, cervical radiculopathy, or plexopathy. There is no evidence of right lower limb peripheral neuropathy, lumbosacral radiculopathy, or plexopathy. There is no evidence ov myopathy. Please see separate document with nerve conduction and EMG tables.       Electronically signed by Alfredo Garíca MD on 8/21/2022 at 8:44 PM

## 2022-08-24 NOTE — TELEPHONE ENCOUNTER
I tried calling [facility], the only options they gave me were for a fax #'s.  I was able to submit the PA on [email or web address].

## 2022-09-22 ENCOUNTER — TELEPHONE (OUTPATIENT)
Dept: NEUROLOGY | Age: 37
End: 2022-09-22

## 2022-09-22 NOTE — TELEPHONE ENCOUNTER
Received a fax from pharmacy stating Nurtec needs PA. This was completed on CMM.  Received an immediate approval.

## 2022-10-19 DIAGNOSIS — G89.29 CHRONIC NECK PAIN: ICD-10-CM

## 2022-10-19 DIAGNOSIS — M62.838 MUSCLE SPASM: ICD-10-CM

## 2022-10-19 DIAGNOSIS — G44.221 CHRONIC TENSION-TYPE HEADACHE, INTRACTABLE: ICD-10-CM

## 2022-10-19 DIAGNOSIS — G43.119 INTRACTABLE MIGRAINE WITH AURA WITHOUT STATUS MIGRAINOSUS: ICD-10-CM

## 2022-10-19 DIAGNOSIS — M54.2 CHRONIC NECK PAIN: ICD-10-CM

## 2022-10-19 DIAGNOSIS — R52 INTRACTABLE PAIN: ICD-10-CM

## 2022-10-20 RX ORDER — TIZANIDINE 4 MG/1
TABLET ORAL
Qty: 135 TABLET | Refills: 0 | Status: SHIPPED | OUTPATIENT
Start: 2022-10-20

## 2022-10-20 NOTE — TELEPHONE ENCOUNTER
Pharmacy requesting refill of Tizanidine.       Medication active on med list yes      Date of last fill: 4/21/22 for #135 and 1 refill  verified on 10/20/2022    verified by Jenny Soares LPN      Date of last appointment: 7/25/2022    Next Visit Date: 3 m follow up - schedule not available

## 2022-11-26 DIAGNOSIS — G89.29 CHRONIC NECK PAIN: ICD-10-CM

## 2022-11-26 DIAGNOSIS — M54.2 CHRONIC NECK PAIN: ICD-10-CM

## 2022-11-26 DIAGNOSIS — G43.119 INTRACTABLE MIGRAINE WITH AURA WITHOUT STATUS MIGRAINOSUS: ICD-10-CM

## 2022-11-26 DIAGNOSIS — R52 INTRACTABLE PAIN: ICD-10-CM

## 2022-11-26 DIAGNOSIS — G44.221 CHRONIC TENSION-TYPE HEADACHE, INTRACTABLE: ICD-10-CM

## 2022-11-26 DIAGNOSIS — M62.838 MUSCLE SPASM: ICD-10-CM

## 2022-11-28 NOTE — TELEPHONE ENCOUNTER
Patient requesting refill of tizanidine. Medication active on med list yes      Date of last fill: 10/20/22  with 0 refills verified on 11/28/22  verified by HELLEN JUÁREZ      Date of last appointment 7/25/22    Next Visit Date:  Visit date not found  Patient is going to [address] [place] [address] also for migraines. She can contact the office to make an appt.

## 2022-11-29 RX ORDER — TIZANIDINE 4 MG/1
TABLET ORAL
Qty: 135 TABLET | Refills: 0 | Status: SHIPPED | OUTPATIENT
Start: 2022-11-29

## 2022-12-02 ENCOUNTER — TELEPHONE (OUTPATIENT)
Dept: NEUROLOGY | Age: 37
End: 2022-12-02

## 2022-12-02 DIAGNOSIS — M54.2 CHRONIC NECK PAIN: ICD-10-CM

## 2022-12-02 DIAGNOSIS — R52 INTRACTABLE PAIN: ICD-10-CM

## 2022-12-02 DIAGNOSIS — G44.221 CHRONIC TENSION-TYPE HEADACHE, INTRACTABLE: ICD-10-CM

## 2022-12-02 DIAGNOSIS — G89.29 CHRONIC NECK PAIN: ICD-10-CM

## 2022-12-02 RX ORDER — PREGABALIN 150 MG/1
1 CAPSULE ORAL 4 TIMES DAILY
COMMUNITY
Start: 2022-11-11

## 2022-12-02 RX ORDER — METHOCARBAMOL 500 MG/1
500 TABLET, FILM COATED ORAL 2 TIMES DAILY PRN
COMMUNITY
Start: 2022-10-26

## 2022-12-02 RX ORDER — DULOXETIN HYDROCHLORIDE 30 MG/1
30 CAPSULE, DELAYED RELEASE ORAL DAILY
COMMUNITY
Start: 2022-10-26

## 2022-12-02 NOTE — TELEPHONE ENCOUNTER
Essence Ware wants to know if you'd like to continue Jeanette's Tizanidine with her [facility] order for methocarbamol? The new order was from [address] [place] [address] on 10/26/2022. I also have a new order showing in [address] [place] [address] for the patient starting Lyrica 150mg 4xs daily, you had her on 100mg BID. Please advise.       Bc Nguyen: [phone]

## 2022-12-05 NOTE — TELEPHONE ENCOUNTER
The patient is under the care of a neurologist at the [facility]. Please follow their orders and disregard mine.

## 2023-11-06 ENCOUNTER — OFFICE VISIT (OUTPATIENT)
Dept: PRIMARY CARE CLINIC | Age: 38
End: 2023-11-06
Payer: COMMERCIAL

## 2023-11-06 VITALS
WEIGHT: 125 LBS | TEMPERATURE: 98.2 F | BODY MASS INDEX: 21.34 KG/M2 | HEIGHT: 64 IN | DIASTOLIC BLOOD PRESSURE: 88 MMHG | OXYGEN SATURATION: 98 % | SYSTOLIC BLOOD PRESSURE: 129 MMHG | HEART RATE: 69 BPM

## 2023-11-06 DIAGNOSIS — M43.6 ACUTE MUSCLE STIFFNESS OF NECK: Primary | ICD-10-CM

## 2023-11-06 PROCEDURE — 3074F SYST BP LT 130 MM HG: CPT

## 2023-11-06 PROCEDURE — 3079F DIAST BP 80-89 MM HG: CPT

## 2023-11-06 PROCEDURE — 99213 OFFICE O/P EST LOW 20 MIN: CPT

## 2023-11-06 RX ORDER — CYCLOBENZAPRINE HCL 10 MG
10 TABLET ORAL 3 TIMES DAILY PRN
Qty: 21 TABLET | Refills: 0 | Status: SHIPPED | OUTPATIENT
Start: 2023-11-06 | End: 2023-11-13

## 2023-11-06 ASSESSMENT — ENCOUNTER SYMPTOMS
VOMITING: 0
RESPIRATORY NEGATIVE: 1
EYES NEGATIVE: 1
ABDOMINAL DISTENTION: 0
ABDOMINAL PAIN: 0
FACIAL SWELLING: 0
EYE ITCHING: 0
EYE REDNESS: 0
WHEEZING: 0
SINUS PRESSURE: 0
SORE THROAT: 0
BLOOD IN STOOL: 0
DIARRHEA: 0
PHOTOPHOBIA: 0
EYE DISCHARGE: 0
TROUBLE SWALLOWING: 0
SHORTNESS OF BREATH: 0
COLOR CHANGE: 0
EYE PAIN: 0
RECTAL PAIN: 0
BACK PAIN: 0
CHOKING: 0
VOICE CHANGE: 0
SINUS PAIN: 0
ANAL BLEEDING: 0
STRIDOR: 0
RHINORRHEA: 0
NAUSEA: 0
CHEST TIGHTNESS: 0
CONSTIPATION: 0
COUGH: 0
APNEA: 0
GASTROINTESTINAL NEGATIVE: 1

## 2023-11-06 NOTE — PROGRESS NOTES
[facility]  [address] [facility]  [address]  [address] [place] [address]  Dept: [phone]    Carmen Rascon is a 41 y.o. female Established patient, who presents to the walk-in clinic today with conditions/complaints as noted below:    Chief Complaint   Patient presents with    Otalgia     Right ear pain x 1 week         HPI:     Otalgia   There is pain in the right ear. This is a new problem. The current episode started in the past 7 days. The problem occurs constantly. The problem has been gradually worsening. There has been no fever. The pain is moderate. Associated symptoms include neck pain. Pertinent negatives include no abdominal pain, coughing, diarrhea, ear discharge, headaches, hearing loss, rash, rhinorrhea, sore throat or vomiting. Treatments tried: Motrin, icing, heat. The treatment provided no relief. Pt has h/o cervical fusion, she feels her R side is tense at times which radiates to her R ear. Denies high fevers, body aches, projectile vomiting. Past Medical History:   Diagnosis Date    Headache 2003    MIGRAINES    Hypertension 05/2019    ON RX    Psoriasis     BEHIND EARS       Current Outpatient Medications   Medication Sig Dispense Refill    cyclobenzaprine (FLEXERIL) 10 MG tablet Take 1 tablet by mouth 3 times daily as needed for Muscle spasms 21 tablet 0    DULoxetine (CYMBALTA) 30 MG extended release capsule Take 1 capsule by mouth daily      methocarbamol (ROBAXIN) 500 MG tablet Take 1 tablet by mouth 2 times daily as needed      pregabalin (LYRICA) 150 MG capsule Take 1 capsule by mouth 4 times daily.       Rimegepant Sulfate (NURTEC) 75 MG TBDP Take one tab every other day 15 tablet 3    butalbital-acetaminophen-caffeine (FIORICET, ESGIC) -40 MG per tablet TAKE ONE TABLET BY

## 2023-11-17 ENCOUNTER — OFFICE VISIT (OUTPATIENT)
Dept: PRIMARY CARE CLINIC | Age: 38
End: 2023-11-17
Payer: COMMERCIAL

## 2023-11-17 VITALS
TEMPERATURE: 99.3 F | DIASTOLIC BLOOD PRESSURE: 81 MMHG | OXYGEN SATURATION: 100 % | HEART RATE: 79 BPM | SYSTOLIC BLOOD PRESSURE: 136 MMHG

## 2023-11-17 DIAGNOSIS — M54.12 CERVICAL RADICULOPATHY: Primary | ICD-10-CM

## 2023-11-17 DIAGNOSIS — M25.511 ACUTE PAIN OF RIGHT SHOULDER: ICD-10-CM

## 2023-11-17 PROCEDURE — 3075F SYST BP GE 130 - 139MM HG: CPT

## 2023-11-17 PROCEDURE — 3079F DIAST BP 80-89 MM HG: CPT

## 2023-11-17 PROCEDURE — 99213 OFFICE O/P EST LOW 20 MIN: CPT

## 2023-11-17 PROCEDURE — 96372 THER/PROPH/DIAG INJ SC/IM: CPT

## 2023-11-17 RX ORDER — CYCLOBENZAPRINE HCL 10 MG
10 TABLET ORAL 3 TIMES DAILY PRN
Qty: 30 TABLET | Refills: 0 | Status: SHIPPED | OUTPATIENT
Start: 2023-11-17 | End: 2023-11-27

## 2023-11-17 RX ORDER — KETOROLAC TROMETHAMINE 30 MG/ML
60 INJECTION, SOLUTION INTRAMUSCULAR; INTRAVENOUS ONCE
Status: COMPLETED | OUTPATIENT
Start: 2023-11-17 | End: 2023-11-17

## 2023-11-17 RX ADMIN — KETOROLAC TROMETHAMINE 60 MG: 30 INJECTION, SOLUTION INTRAMUSCULAR; INTRAVENOUS at 16:17

## 2023-11-17 ASSESSMENT — ENCOUNTER SYMPTOMS
ABDOMINAL PAIN: 0
EYE REDNESS: 0
CONSTIPATION: 0
RESPIRATORY NEGATIVE: 1
DIARRHEA: 0
VISUAL CHANGE: 0
CHOKING: 0
CHEST TIGHTNESS: 0
PHOTOPHOBIA: 0
SORE THROAT: 0
WHEEZING: 0
GASTROINTESTINAL NEGATIVE: 1
ANAL BLEEDING: 0
NAUSEA: 0
BACK PAIN: 0
SINUS PRESSURE: 0
STRIDOR: 0
SHORTNESS OF BREATH: 0
EYES NEGATIVE: 1
COLOR CHANGE: 0
FACIAL SWELLING: 0
RHINORRHEA: 0
RECTAL PAIN: 0
EYE PAIN: 0
ABDOMINAL DISTENTION: 0
COUGH: 0
TROUBLE SWALLOWING: 0
EYE DISCHARGE: 0
APNEA: 0
EYE ITCHING: 0
SINUS PAIN: 0
VOICE CHANGE: 0
VOMITING: 0
BLOOD IN STOOL: 0

## 2023-11-28 ENCOUNTER — OFFICE VISIT (OUTPATIENT)
Dept: INTERNAL MEDICINE CLINIC | Age: 38
End: 2023-11-28
Payer: COMMERCIAL

## 2023-11-28 VITALS
SYSTOLIC BLOOD PRESSURE: 118 MMHG | HEART RATE: 54 BPM | RESPIRATION RATE: 10 BRPM | HEIGHT: 64 IN | TEMPERATURE: 96.9 F | BODY MASS INDEX: 23.05 KG/M2 | OXYGEN SATURATION: 98 % | WEIGHT: 135 LBS | DIASTOLIC BLOOD PRESSURE: 70 MMHG

## 2023-11-28 DIAGNOSIS — M96.1 FAILED BACK SYNDROME OF CERVICAL SPINE: ICD-10-CM

## 2023-11-28 DIAGNOSIS — I10 ESSENTIAL HYPERTENSION: ICD-10-CM

## 2023-11-28 DIAGNOSIS — Z98.1 HISTORY OF FUSION OF CERVICAL SPINE: ICD-10-CM

## 2023-11-28 DIAGNOSIS — F32.A ANXIETY AND DEPRESSION: ICD-10-CM

## 2023-11-28 DIAGNOSIS — G44.221 CHRONIC TENSION-TYPE HEADACHE, INTRACTABLE: ICD-10-CM

## 2023-11-28 DIAGNOSIS — F41.9 ANXIETY AND DEPRESSION: ICD-10-CM

## 2023-11-28 DIAGNOSIS — M54.12 CERVICAL RADICULOPATHY: ICD-10-CM

## 2023-11-28 DIAGNOSIS — M62.838 MUSCLE SPASM: ICD-10-CM

## 2023-11-28 DIAGNOSIS — J31.0 CHRONIC RHINITIS: ICD-10-CM

## 2023-11-28 DIAGNOSIS — M75.01 ADHESIVE CAPSULITIS OF RIGHT SHOULDER: Primary | ICD-10-CM

## 2023-11-28 DIAGNOSIS — G43.719 INTRACTABLE CHRONIC MIGRAINE WITHOUT AURA AND WITHOUT STATUS MIGRAINOSUS: ICD-10-CM

## 2023-11-28 PROCEDURE — 3078F DIAST BP <80 MM HG: CPT | Performed by: FAMILY MEDICINE

## 2023-11-28 PROCEDURE — 3074F SYST BP LT 130 MM HG: CPT | Performed by: FAMILY MEDICINE

## 2023-11-28 PROCEDURE — 99214 OFFICE O/P EST MOD 30 MIN: CPT | Performed by: FAMILY MEDICINE

## 2023-11-28 SDOH — ECONOMIC STABILITY: FOOD INSECURITY: WITHIN THE PAST 12 MONTHS, YOU WORRIED THAT YOUR FOOD WOULD RUN OUT BEFORE YOU GOT MONEY TO BUY MORE.: NEVER TRUE

## 2023-11-28 SDOH — ECONOMIC STABILITY: HOUSING INSECURITY
IN THE LAST 12 MONTHS, WAS THERE A TIME WHEN YOU DID NOT HAVE A STEADY PLACE TO SLEEP OR SLEPT IN A SHELTER (INCLUDING NOW)?: NO

## 2023-11-28 SDOH — ECONOMIC STABILITY: INCOME INSECURITY: HOW HARD IS IT FOR YOU TO PAY FOR THE VERY BASICS LIKE FOOD, HOUSING, MEDICAL CARE, AND HEATING?: NOT HARD AT ALL

## 2023-11-28 ASSESSMENT — COLUMBIA-SUICIDE SEVERITY RATING SCALE - C-SSRS
4. HAVE YOU HAD THESE THOUGHTS AND HAD SOME INTENTION OF ACTING ON THEM?: NO
7. DID THIS OCCUR IN THE LAST THREE MONTHS: NO
5. HAVE YOU STARTED TO WORK OUT OR WORKED OUT THE DETAILS OF HOW TO KILL YOURSELF? DO YOU INTEND TO CARRY OUT THIS PLAN?: NO
3. HAVE YOU BEEN THINKING ABOUT HOW YOU MIGHT KILL YOURSELF?: NO

## 2023-11-28 ASSESSMENT — PATIENT HEALTH QUESTIONNAIRE - PHQ9
4. FEELING TIRED OR HAVING LITTLE ENERGY: 0
1. LITTLE INTEREST OR PLEASURE IN DOING THINGS: 0
3. TROUBLE FALLING OR STAYING ASLEEP: 0
8. MOVING OR SPEAKING SO SLOWLY THAT OTHER PEOPLE COULD HAVE NOTICED. OR THE OPPOSITE, BEING SO FIGETY OR RESTLESS THAT YOU HAVE BEEN MOVING AROUND A LOT MORE THAN USUAL: 0
SUM OF ALL RESPONSES TO PHQ QUESTIONS 1-9: 1
SUM OF ALL RESPONSES TO PHQ9 QUESTIONS 1 & 2: 1
2. FEELING DOWN, DEPRESSED OR HOPELESS: 1
9. THOUGHTS THAT YOU WOULD BE BETTER OFF DEAD, OR OF HURTING YOURSELF: 0
SUM OF ALL RESPONSES TO PHQ QUESTIONS 1-9: 1
7. TROUBLE CONCENTRATING ON THINGS, SUCH AS READING THE NEWSPAPER OR WATCHING TELEVISION: 0
SUM OF ALL RESPONSES TO PHQ QUESTIONS 1-9: 1
SUM OF ALL RESPONSES TO PHQ QUESTIONS 1-9: 1
6. FEELING BAD ABOUT YOURSELF - OR THAT YOU ARE A FAILURE OR HAVE LET YOURSELF OR YOUR FAMILY DOWN: 0
10. IF YOU CHECKED OFF ANY PROBLEMS, HOW DIFFICULT HAVE THESE PROBLEMS MADE IT FOR YOU TO DO YOUR WORK, TAKE CARE OF THINGS AT HOME, OR GET ALONG WITH OTHER PEOPLE: 0
5. POOR APPETITE OR OVEREATING: 0

## 2023-11-28 ASSESSMENT — ENCOUNTER SYMPTOMS
GASTROINTESTINAL NEGATIVE: 1
RESPIRATORY NEGATIVE: 1
ALLERGIC/IMMUNOLOGIC NEGATIVE: 1
EYES NEGATIVE: 1

## 2023-11-28 NOTE — PROGRESS NOTES
Subjective:      Patient ID: Andreas Hendrix is a 41 y.o. female. Shoulder Pain   The pain is present in the neck (Right shoulder). This is a chronic problem. The current episode started more than 1 month ago. There has been no history of extremity trauma. The problem occurs constantly. The problem has been unchanged. The quality of the pain is described as pounding and sharp. The pain is at a severity of 8/10. The pain is severe. Associated symptoms include an inability to bear weight, a limited range of motion and stiffness. The symptoms are aggravated by cold, contact and lying down. Treatments tried: Lyrica, muscle relaxants, Cymbalta. The treatment provided no relief. Her past medical history is significant for osteoarthritis. Review of Systems   Constitutional: Negative. HENT: Negative. Eyes: Negative. Respiratory: Negative. Cardiovascular: Negative. Gastrointestinal: Negative. Endocrine: Negative. Musculoskeletal:  Positive for arthralgias, myalgias, neck pain and stiffness. Skin: Negative. Allergic/Immunologic: Negative. Neurological: Negative. Hematological: Negative. Psychiatric/Behavioral:  Positive for dysphoric mood. The patient is nervous/anxious. Past family and social history unremarkable. Diagnosis Orders   1. Adhesive capsulitis of right shoulder  [facility] - [facility] - Audi Galicia MD, Pain Management, [place]    XR SHOULDER RIGHT (MIN 2 VIEWS)    XR CERVICAL SPINE (2-3 VIEWS)      2. Chronic rhinitis        3. Intractable chronic migraine without aura and without status migrainosus  CBC    Comprehensive Metabolic Panel    Hemoglobin A1C    Lipid Panel    TSH      4. Muscle spasm  [facility] - Avtar Swartz MD, Pain Management, [address]    CBC    Comprehensive Metabolic Panel    Hemoglobin A1C    Lipid Panel    TSH      5. Chronic tension-type headache, intractable        6.  Cervical

## 2023-12-05 ENCOUNTER — HOSPITAL ENCOUNTER (OUTPATIENT)
Age: 38
Discharge: HOME | End: 2023-12-07
Payer: COMMERCIAL

## 2023-12-05 ENCOUNTER — HOSPITAL ENCOUNTER (OUTPATIENT)
Dept: GENERAL RADIOLOGY | Age: 38
Discharge: HOME | End: 2023-12-07
Payer: COMMERCIAL

## 2023-12-05 ENCOUNTER — HOSPITAL ENCOUNTER (OUTPATIENT)
Age: 38
Setting detail: SPECIMEN
Discharge: HOME | End: 2023-12-05

## 2023-12-05 DIAGNOSIS — M54.12 CERVICAL RADICULOPATHY: ICD-10-CM

## 2023-12-05 DIAGNOSIS — G43.719 INTRACTABLE CHRONIC MIGRAINE WITHOUT AURA AND WITHOUT STATUS MIGRAINOSUS: ICD-10-CM

## 2023-12-05 DIAGNOSIS — M75.01 ADHESIVE CAPSULITIS OF RIGHT SHOULDER: ICD-10-CM

## 2023-12-05 DIAGNOSIS — M62.838 MUSCLE SPASM: ICD-10-CM

## 2023-12-05 DIAGNOSIS — I10 ESSENTIAL HYPERTENSION: ICD-10-CM

## 2023-12-05 LAB
ERYTHROCYTE [DISTWIDTH] IN BLOOD BY AUTOMATED COUNT: 14.8 % (ref 11.8–14.4)
EST. AVERAGE GLUCOSE BLD GHB EST-MCNC: 94 MG/DL
HBA1C MFR BLD: 4.9 % (ref 4–6)
HCT VFR BLD AUTO: 38.2 % (ref 36.3–47.1)
HGB BLD-MCNC: 12.8 G/DL (ref 11.9–15.1)
MCH RBC QN AUTO: 30.1 PG (ref 25.2–33.5)
MCHC RBC AUTO-ENTMCNC: 33.5 G/DL (ref 28.4–34.8)
MCV RBC AUTO: 89.9 FL (ref 82.6–102.9)
NRBC BLD-RTO: 0 PER 100 WBC
PLATELET # BLD AUTO: 194 K/UL (ref 138–453)
PMV BLD AUTO: 11.1 FL (ref 8.1–13.5)
RBC # BLD AUTO: 4.25 M/UL (ref 3.95–5.11)
WBC OTHER # BLD: 3.6 K/UL (ref 3.5–11.3)

## 2023-12-05 PROCEDURE — 72040 X-RAY EXAM NECK SPINE 2-3 VW: CPT

## 2023-12-05 PROCEDURE — 73030 X-RAY EXAM OF SHOULDER: CPT

## 2023-12-06 LAB
ALBUMIN SERPL-MCNC: 4.4 G/DL (ref 3.5–5.2)
ALBUMIN/GLOB SERPL: 2 {RATIO} (ref 1–2.5)
ALP SERPL-CCNC: 55 U/L (ref 35–104)
ALT SERPL-CCNC: 20 U/L (ref 10–35)
ANION GAP SERPL CALCULATED.3IONS-SCNC: 9 MMOL/L (ref 9–16)
AST SERPL-CCNC: 29 U/L (ref 10–35)
BILIRUB SERPL-MCNC: 0.5 MG/DL (ref 0–1.2)
BUN SERPL-MCNC: 16 MG/DL (ref 6–20)
CALCIUM SERPL-MCNC: 9.3 MG/DL (ref 8.6–10.4)
CHLORIDE SERPL-SCNC: 102 MMOL/L (ref 98–107)
CHOLEST SERPL-MCNC: 220 MG/DL (ref 0–199)
CHOLESTEROL/HDL RATIO: 2
CO2 SERPL-SCNC: 25 MMOL/L (ref 20–31)
CREAT SERPL-MCNC: 0.8 MG/DL (ref 0.5–0.9)
GFR SERPL CREATININE-BSD FRML MDRD: >60 ML/MIN/1.73M2
GLUCOSE SERPL-MCNC: 84 MG/DL (ref 74–99)
HDLC SERPL-MCNC: 92 MG/DL
LDLC SERPL CALC-MCNC: 115 MG/DL (ref 0–100)
POTASSIUM SERPL-SCNC: 4.7 MMOL/L (ref 3.7–5.3)
PROT SERPL-MCNC: 7.3 G/DL (ref 6.6–8.7)
SODIUM SERPL-SCNC: 136 MMOL/L (ref 136–145)
TRIGL SERPL-MCNC: 66 MG/DL (ref 0–149)
TSH SERPL DL<=0.05 MIU/L-ACNC: 1.72 UIU/ML (ref 0.27–4.2)
VLDLC SERPL CALC-MCNC: 13 MG/DL

## 2023-12-07 ENCOUNTER — HOSPITAL ENCOUNTER (OUTPATIENT)
Dept: PHYSICAL THERAPY | Facility: CLINIC | Age: 38
Setting detail: THERAPIES SERIES
Discharge: HOME | End: 2023-12-07
Payer: COMMERCIAL

## 2023-12-07 PROCEDURE — 97163 PT EVAL HIGH COMPLEX 45 MIN: CPT

## 2023-12-07 PROCEDURE — 97110 THERAPEUTIC EXERCISES: CPT

## 2023-12-07 NOTE — CONSULTS
shoulder ROM though some limitations  as charted below, right abduction most limited     Right  Left  Flexion   142  155  Abd   91,   104  IR   Behind back, normal    Full PROM supine of bilateral shoulders in all planes, pain limited AROM and strength testing      TESTS (+/-) LEFT RIGHT Not Tested   Joint Mobility   []   Cerv. Comp + pain + pain []   Cerv. Distraction - - []   Cerv. Alar/Transverse - - []   Vertebral Artery   []   Dilip Tests ? Pain ? Pain No Change Not Tested   Rep Prot [x] [] [] []   Rep Retract [x] [] [] []       OBSERVATION No Deficit Deficit Not Tested Comments   Posture       Forward Head [x] [] [] Left scapula elevated in standing   Rounded Shoulders [] [x] [] Tightness in pecs bilaterally   Kyphosis [x] [] []    Lordosis [x] [] []    Lateral Shift [x] [] []    Scoliosis [x] [] []    Slumped Sitting [x] [] []    Palpation [] [] []    Sensation [] [x] [] Right entire side, UE, LE tingling all the time   Edema [] [] []    Neurological [] [x] [] Had had testing, MRI, to brain \"all fine\"  Has an aunt with MS  Right pinky toe and next two all the way up, pinky and 4th, 5th tingling, 1.2 of right side       Functional Test: UEFS Score:  20/80 = 75% functionally impaired       Functional Test: NDI  Score:   = 32/50 = 64% functionally impaired     Comments:    Assessment: Patient presents with severe pain in neck, right shoulder, right arm (and right leg- see description above)  She does have a history of migraines as well as a failed C3C4 fusion in 2019 (though current areas of numbness are not in the nerve patterns for C3C4)  It is possible that the neck regions inferior to surgical site have tight musculature from compensation of surgical area. Shoulder AROM and strength in limited, though full PROM so do not suspect frozen shoulder at this time. She is in the process of having testing and then seeing pain management again so perhaps that will help to differentiate source of pain.   At this
having to have  help with more tasks at home with lifting and reaching. She works 2 jobs to work full time. 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 Problem list, as detailed above:   [x] ? Back Pain:  -right side   [x] ? Cervical Pain:   Right side  [x] ? ROM: AROM of shoulders, cervical limitations    [x] ? Strength: UE  [x] ? Function: decreasing ability to complete tasks at home, increasing pain end of work day when waitressing  [x] Postural Deviations- elevated left shoulder/scapula  [x] Other: Constant tingling half of the right side of her body  Reports anxiety and depression, partly from living with this ongoing pain    STG: (to be met in 6 treatments)  ? 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 ? ROM: Full AROM of right shoulder to reach up to cupboards and groom hair without increased pain  Correctly perform cervical isometrics to start light strengthening to cervical region to support neck considering previous fusion surgery  Patient to be independent with home exercise program as demonstrated by performance with correct form without cues. LTG: (to be met in 12 treatments)  ? Strength: to +4/5 in right shoulder to be able to lift trays at work, open doors, and clean with minimal pain  4/10 max pain in neck and right shoulder to allow her to look down to read without increased pain for 1 hour.   Work 4 hours without increased pain in neck and right arm      Patient goals:  able to use right arm with less pain    Rehab Potential:  [] Good  [x] Fair  [] Poor   Suggested Professional Referral:  [] No  [x] Yes:PCP, pain management, consider neurology  Barriers to Goal Achievement:  [] No  [x] Yes:complex symptoms  Domestic Concerns:  [x] No  [] Yes:    Pt. Education:  [x] Plans/Goals, Risks/Benefits discussed  [x] Home exercise program  Method of Education: [x] Verbal  [x] Demo  [x]

## 2023-12-12 ENCOUNTER — HOSPITAL ENCOUNTER (OUTPATIENT)
Dept: PHYSICAL THERAPY | Facility: CLINIC | Age: 38
Setting detail: THERAPIES SERIES
Discharge: HOME | End: 2023-12-12
Payer: COMMERCIAL

## 2023-12-12 PROCEDURE — 97110 THERAPEUTIC EXERCISES: CPT

## 2023-12-12 PROCEDURE — 97140 MANUAL THERAPY 1/> REGIONS: CPT

## 2023-12-12 NOTE — FLOWSHEET NOTE
[] [facility]) - [facility]  Therapy  [address] [facility].  [phone]  F: [phone] [x] [address] [facility]  [address] [facility]   [address]  P: [phone]  F: [phone] [] [address] [facility] [address]  [place]: [phone]  F: [phone]      Physical Therapy Daily Treatment Note    Date:  2023  Patient Name:  Andreas Hendrix    :  1985  MRN: [id number]  Physician: Dr. Marylin Leary: [facility] ([id number] [facility])  Medical Diagnosis:   P67.495 (ICD-10-CM) - Muscle spasm   M54.12 (ICD-10-CM) - Cervical radiculopathy   M96.1 (ICD-10-CM) - Failed back syndrome of cervical spine   M75.01 (ICD-10-CM) - Adhesive capsulitis of right shoulder                             Rehab Codes: M54.2, M25.611, M79.1, M79.601, R20.2,   Onset Date: 2023 (date of script, worsening over the past 3 months, ongoing for a year)                    Next 's appt. : 2024  Visit# / total visits: ; Cancels/No Shows: 0 / 0    Subjective:    Pain:  [x] Yes  [] No Location:  right shoulder, neck Pain Rating: (0-10 scale) 6/10, better than eval  Pain altered Tx:  [] No  [x] Yes  Action:added hypervolt to right upper trap  Comments:  Reports she went to hug  this morning and shoulder \"popped\"  reporting more range now. Was sore after evaluation.     EVAL  Functional Test: UEFS Score:  20/80 = 75% functionally impaired       EVAL  Functional Test: NDI  Score:   = 32/50 = 64% functionally impaired      Objective:  Modalities: hypervolt to right upper trap 10 min, end of treatment for muscle guarding and increased tension  Precautions: 12-20 to see pain management  Hx of C3C4 failed fusion surgery  Exercises:  Exercise Reps/ Time Weight/ Level Comments   Supine cervical PROM and SOR, slight distraction X       UBE  L1  1 min, forward, 1

## 2023-12-26 ENCOUNTER — HOSPITAL ENCOUNTER (OUTPATIENT)
Dept: PHYSICAL THERAPY | Facility: CLINIC | Age: 38
Setting detail: THERAPIES SERIES
Discharge: HOME | End: 2023-12-26
Payer: COMMERCIAL

## 2023-12-26 PROCEDURE — 97140 MANUAL THERAPY 1/> REGIONS: CPT

## 2023-12-26 PROCEDURE — 97110 THERAPEUTIC EXERCISES: CPT

## 2024-01-03 ENCOUNTER — HOSPITAL ENCOUNTER (OUTPATIENT)
Dept: PHYSICAL THERAPY | Facility: CLINIC | Age: 39
Setting detail: THERAPIES SERIES
Discharge: HOME | End: 2024-01-03
Payer: COMMERCIAL

## 2024-01-03 PROCEDURE — 97140 MANUAL THERAPY 1/> REGIONS: CPT

## 2024-01-03 PROCEDURE — 97110 THERAPEUTIC EXERCISES: CPT

## 2024-01-03 NOTE — FLOWSHEET NOTE
[] [facility]  Outpatient Rehabilitation &  Therapy  [address]  [phone]  F: [phone] [x] [facility]  Outpatient Rehabilitation &  Therapy  [address]   [address]  P: [phone]  F: [phone] [] [facility]  Outpatient Rehabilitation &  Therapy  [address]  P: [phone]  F: [phone]      Physical Therapy Daily Treatment Note    Date:  1/3/2024  Patient Name:  Jeanette Conteh    :  1985  MRN: [id number]  Physician: Dr. Wally Perez                                  Insurance: [facility] ([facility])  Medical Diagnosis:   M62.838 (ICD-10-CM) - Muscle spasm   M54.12 (ICD-10-CM) - Cervical radiculopathy   M96.1 (ICD-10-CM) - Failed back syndrome of cervical spine   M75.01 (ICD-10-CM) - Adhesive capsulitis of right shoulder                             Rehab Codes: M54.2, M25.611, M79.1, M79.601, R20.2,   Onset Date: 2023 (date of script, worsening over the past 3 months, ongoing for a year)                    Next 's appt.: 2024  Visit# / total visits: ;     Cancels/No Shows: 0 / 0    Subjective:    Pain:  [x] Yes  [] No Location:  right shoulder, neck Pain Rating: (0-10 scale) 7+/10  better without pain increasing  Pain altered Tx:  [] No  [x] Yes  Action: continued hypervolt to right upper trap    Some exercises done supine that were previously done sitting or standing  Comments: Pt arrives reporting right collar bone and shoulder have pain -        EVAL  Functional Test: UEFS Score:  20/80 = 75% functionally impaired       EVAL  Functional Test: NDI  Score:   = 32/50 = 64% functionally impaired      Objective:  Modalities: hypervolt to right upper trap 10 min, prior to supine PROM for muscle guarding and increased tension  Precautions: 12-20 to see pain management  Hx of C3C4 failed fusion surgery  Exercises: bolded completed  Exercise Reps/ Time Weight/ Level Comments   UBE  L1  2x   1 min,

## 2024-01-09 ENCOUNTER — OFFICE VISIT (OUTPATIENT)
Dept: INTERNAL MEDICINE CLINIC | Age: 39
End: 2024-01-09
Payer: COMMERCIAL

## 2024-01-09 VITALS
WEIGHT: 135 LBS | OXYGEN SATURATION: 98 % | BODY MASS INDEX: 23.05 KG/M2 | DIASTOLIC BLOOD PRESSURE: 78 MMHG | SYSTOLIC BLOOD PRESSURE: 120 MMHG | HEIGHT: 64 IN | RESPIRATION RATE: 10 BRPM | HEART RATE: 79 BPM | TEMPERATURE: 96.9 F

## 2024-01-09 DIAGNOSIS — J31.0 CHRONIC RHINITIS: Primary | ICD-10-CM

## 2024-01-09 DIAGNOSIS — Z98.1 HISTORY OF FUSION OF CERVICAL SPINE: ICD-10-CM

## 2024-01-09 DIAGNOSIS — G44.221 CHRONIC TENSION-TYPE HEADACHE, INTRACTABLE: ICD-10-CM

## 2024-01-09 DIAGNOSIS — M62.838 MUSCLE SPASM: ICD-10-CM

## 2024-01-09 DIAGNOSIS — M54.12 CERVICAL RADICULOPATHY: ICD-10-CM

## 2024-01-09 DIAGNOSIS — M96.1 FAILED BACK SYNDROME OF CERVICAL SPINE: ICD-10-CM

## 2024-01-09 DIAGNOSIS — F32.A ANXIETY AND DEPRESSION: ICD-10-CM

## 2024-01-09 DIAGNOSIS — F41.9 ANXIETY AND DEPRESSION: ICD-10-CM

## 2024-01-09 DIAGNOSIS — I10 ESSENTIAL HYPERTENSION: ICD-10-CM

## 2024-01-09 DIAGNOSIS — G43.719 INTRACTABLE CHRONIC MIGRAINE WITHOUT AURA AND WITHOUT STATUS MIGRAINOSUS: ICD-10-CM

## 2024-01-09 PROCEDURE — 3074F SYST BP LT 130 MM HG: CPT | Performed by: FAMILY MEDICINE

## 2024-01-09 PROCEDURE — 99214 OFFICE O/P EST MOD 30 MIN: CPT | Performed by: FAMILY MEDICINE

## 2024-01-09 PROCEDURE — 3078F DIAST BP <80 MM HG: CPT | Performed by: FAMILY MEDICINE

## 2024-01-09 SDOH — ECONOMIC STABILITY: FOOD INSECURITY: WITHIN THE PAST 12 MONTHS, YOU WORRIED THAT YOUR FOOD WOULD RUN OUT BEFORE YOU GOT MONEY TO BUY MORE.: NEVER TRUE

## 2024-01-09 SDOH — ECONOMIC STABILITY: INCOME INSECURITY: HOW HARD IS IT FOR YOU TO PAY FOR THE VERY BASICS LIKE FOOD, HOUSING, MEDICAL CARE, AND HEATING?: NOT HARD AT ALL

## 2024-01-09 SDOH — ECONOMIC STABILITY: FOOD INSECURITY: WITHIN THE PAST 12 MONTHS, THE FOOD YOU BOUGHT JUST DIDN'T LAST AND YOU DIDN'T HAVE MONEY TO GET MORE.: NEVER TRUE

## 2024-01-09 ASSESSMENT — PATIENT HEALTH QUESTIONNAIRE - PHQ9
3. TROUBLE FALLING OR STAYING ASLEEP: 0
SUM OF ALL RESPONSES TO PHQ9 QUESTIONS 1 & 2: 1
SUM OF ALL RESPONSES TO PHQ QUESTIONS 1-9: 1
2. FEELING DOWN, DEPRESSED OR HOPELESS: 1
4. FEELING TIRED OR HAVING LITTLE ENERGY: 0
5. POOR APPETITE OR OVEREATING: 0
7. TROUBLE CONCENTRATING ON THINGS, SUCH AS READING THE NEWSPAPER OR WATCHING TELEVISION: 0
6. FEELING BAD ABOUT YOURSELF - OR THAT YOU ARE A FAILURE OR HAVE LET YOURSELF OR YOUR FAMILY DOWN: 0
1. LITTLE INTEREST OR PLEASURE IN DOING THINGS: 0
SUM OF ALL RESPONSES TO PHQ QUESTIONS 1-9: 1
SUM OF ALL RESPONSES TO PHQ QUESTIONS 1-9: 1
9. THOUGHTS THAT YOU WOULD BE BETTER OFF DEAD, OR OF HURTING YOURSELF: 0
10. IF YOU CHECKED OFF ANY PROBLEMS, HOW DIFFICULT HAVE THESE PROBLEMS MADE IT FOR YOU TO DO YOUR WORK, TAKE CARE OF THINGS AT HOME, OR GET ALONG WITH OTHER PEOPLE: 0
8. MOVING OR SPEAKING SO SLOWLY THAT OTHER PEOPLE COULD HAVE NOTICED. OR THE OPPOSITE, BEING SO FIGETY OR RESTLESS THAT YOU HAVE BEEN MOVING AROUND A LOT MORE THAN USUAL: 0
SUM OF ALL RESPONSES TO PHQ QUESTIONS 1-9: 1

## 2024-01-09 NOTE — PROGRESS NOTES
Left Ear: External ear normal.      Nose:      Comments: Allergies, allergic rhinitis  Eyes:      Conjunctiva/sclera: Conjunctivae normal.      Pupils: Pupils are equal, round, and reactive to light.   Cardiovascular:      Rate and Rhythm: Normal rate and regular rhythm.      Heart sounds: Normal heart sounds.      Comments: Hypertension  Pulmonary:      Effort: Pulmonary effort is normal.      Breath sounds: Normal breath sounds.   Abdominal:      General: Bowel sounds are normal.      Palpations: Abdomen is soft.   Genitourinary:     Vagina: Normal.   Musculoskeletal:         General: Normal range of motion.      Cervical back: Normal range of motion and neck supple.      Comments: Status post posterior decompression C3/C4 with right myelopathy.  No foot drop.  She is established with pain management with no response to epidural injections.  She is on Lyrica, muscle relaxants, Cymbalta and coordination with orthopedic spine surgery and neurology.   Skin:     General: Skin is warm and dry.   Neurological:      Mental Status: She is alert and oriented to person, place, and time.      Deep Tendon Reflexes: Reflexes are normal and symmetric.      Comments: Migraine without aura   Psychiatric:      Comments: Significant anxiety/depression with underlying chronic pain syndrome and adjustment disorder.  She denies suicidality, delusion or hallucination.  She agrees to be seen by [facility]         Assessment:       Diagnosis Orders   1. Chronic rhinitis        2. Intractable chronic migraine without aura and without status migrainosus  [facility] ([facility])      3. Muscle spasm        4. Chronic tension-type headache, intractable  [facility] ([facility])      5. Cervical radiculopathy        6. Essential hypertension        7. History of fusion of cervical spine        8. Anxiety and depression  [facility] ([facility])      9.

## 2024-01-10 ENCOUNTER — HOSPITAL ENCOUNTER (OUTPATIENT)
Dept: PHYSICAL THERAPY | Facility: CLINIC | Age: 39
Setting detail: THERAPIES SERIES
Discharge: HOME | End: 2024-01-10
Payer: COMMERCIAL

## 2024-01-10 PROCEDURE — 97140 MANUAL THERAPY 1/> REGIONS: CPT

## 2024-01-10 PROCEDURE — 97110 THERAPEUTIC EXERCISES: CPT

## 2024-01-10 NOTE — FLOWSHEET NOTE
Weight/ Level Comments   UBE  L1  2x   1 min, forward, 1 back 12-12 added, for mobility and peripheral blood flow to shoulders         Supine          Manual  SOR, B UT release, light cervical distraction  15 min        R shoulder PROM  5'   Poor tolerance, sharp pains at end ranges 1/10          Cervical PROM  x     Chin tuck with cervical flexion  10x   New 12/26    AAROM-flexion  5x  Cane -poor tolerance in R shoulder          Seated       Shoulder rolls  10x    1/3 performed supine    Scapular retraction  10x    1/3 performed supine   Wall slides  10x    added 12/12   Cervical AROM sidebending  5xea       Cervical AROM rotation  5xea        Chin tucks  10x    12/12 added             Standing       T band- rows 20x lime 12/12 added, small  range advised   triceps 20x lime 12/12             Cervical isometrics- side bend, rotation,  10 sec hold 10x each On wall with playball 12/26   1/3 supine   Other:     Specific Instructions for next treatment:  Gentle right shoulder strengthening  Neck strenthening  Manual to decrease tension in neck    Treatment Charges: Mins Units   [x]  Modalities- HP  10 --   [x]  Ther Exercise 18 1   [x]  Manual Therapy 25 2   []  Ther Activities     []  Neuro Re-ed     []  Vasocompression     [] Gait     []  Other     Total Billable time 43 3       Assessment: [] Progressing toward goals.     [] No change.     [x] Other: Began session with UBE warm up follow by manual via hypervolt to R shoulder UT to address tension. Resume SOR, B UT release and light distraction with tenderness reporting in R UT. Followed with current seated exercises as pt has pain in R collar bone during however, able to tolerate exercises asked of her. Attempted PROM to R shoulder with \"sharp\" pain at end ranges. Ended with MHP to cervical spine to address pain symptoms. Advised pt to continue with current HEP, supine isometrics, and heat for pain control.   [x] Patient would continue to benefit from skilled

## 2024-01-17 ENCOUNTER — HOSPITAL ENCOUNTER (OUTPATIENT)
Dept: PHYSICAL THERAPY | Facility: CLINIC | Age: 39
Setting detail: THERAPIES SERIES
Discharge: HOME | End: 2024-01-17
Payer: COMMERCIAL

## 2024-01-17 PROCEDURE — 97110 THERAPEUTIC EXERCISES: CPT

## 2024-01-17 PROCEDURE — 97140 MANUAL THERAPY 1/> REGIONS: CPT

## 2024-01-17 NOTE — FLOWSHEET NOTE
[] [facility]  Outpatient Rehabilitation &  Therapy  [address]  [phone]  F: [phone] [x] [facility]  Outpatient Rehabilitation &  Therapy  [address]   [address]  P: [phone]  F: [phone] [] [facility]  Outpatient Rehabilitation &  Therapy  [address]  P: [phone]  F: [phone]      Physical Therapy Daily Treatment Note    Date:  2024  Patient Name:  Jeanette Conteh    :  1985  MRN: [id number]  Physician: Dr. Wally Perez                                  Insurance: [facility] ([facility])  Medical Diagnosis:   M62.838 (ICD-10-CM) - Muscle spasm   M54.12 (ICD-10-CM) - Cervical radiculopathy   M96.1 (ICD-10-CM) - Failed back syndrome of cervical spine   M75.01 (ICD-10-CM) - Adhesive capsulitis of right shoulder                             Rehab Codes: M54.2, M25.611, M79.1, M79.601, R20.2,   Onset Date: 2023 (date of script, worsening over the past 3 months, ongoing for a year)                    Next 's appt.: 2024  Visit# / total visits: ;     Cancels/No Shows: 0 / 0    Subjective:    Pain:  [x] Yes  [] No Location:  right shoulder, neck, right arm, also in face and right shoulder, whole right upper side Pain Rating: (0-10 scale) 7-8/10    Pain altered Tx:  [] No  [x] Yes  Action: continued hypervolt to right upper trap, manual, HP to cervical spine      Comments: Pt reports \"not the greatest\"  Got MRI results back \"mild to moderate progression of C5C6 degeneration\"  Discussed injections- pt nervous about, awaiting insurance approval - haven't helped in the past.  Had severe pain after having them the first time.    Saw new PCP, trying to get MRI for right shoulder and low back.  Monday had botox again, extra put in right shoulder and temple. - usually takes 10 days to show benefit, now awaiting      EVAL  Functional Test: UEFS Score:  20/80 = 75% functionally impaired

## 2024-01-24 ENCOUNTER — HOSPITAL ENCOUNTER (OUTPATIENT)
Dept: PHYSICAL THERAPY | Facility: CLINIC | Age: 39
Setting detail: THERAPIES SERIES
Discharge: HOME | End: 2024-01-24
Payer: COMMERCIAL

## 2024-01-24 PROCEDURE — 97140 MANUAL THERAPY 1/> REGIONS: CPT

## 2024-01-24 PROCEDURE — 97110 THERAPEUTIC EXERCISES: CPT

## 2024-01-24 NOTE — FLOWSHEET NOTE
[] [facility]  Outpatient Rehabilitation &  Therapy  [address]  [phone]  F: [phone] [x] [facility]  Outpatient Rehabilitation &  Therapy  [address]   [address]  P: [phone]  F: [phone] [] [facility]  Outpatient Rehabilitation &  Therapy  [address]  P: [phone]  F: [phone]      Physical Therapy Daily Treatment Note    Date:  2024  Patient Name:  Jeanette Conteh    :  1985  MRN: [id number]  Physician: Dr. Wally Perez                                  Insurance: [facility] ([facility])  Medical Diagnosis:   M62.838 (ICD-10-CM) - Muscle spasm   M54.12 (ICD-10-CM) - Cervical radiculopathy   M96.1 (ICD-10-CM) - Failed back syndrome of cervical spine   M75.01 (ICD-10-CM) - Adhesive capsulitis of right shoulder                             Rehab Codes: M54.2, M25.611, M79.1, M79.601, R20.2,   Onset Date: 2023 (date of script, worsening over the past 3 months, ongoing for a year)                    Next 's appt.: 2024  Visit# / total visits: ;     Cancels/No Shows: 0 / 0    Subjective:    Pain:  [x] Yes  [] No Location:  right shoulder, neck, right arm, also in face and right shoulder, whole right upper side Pain Rating: (0-10 scale) 5-6/10    Pain altered Tx:  [] No  [x] Yes  Action: continued hypervolt to right upper trap, manual to cervical spine      Comments: Pt reports \"slightly better\"  botox may be starting to help per patient    EVAL  Functional Test: UEFS Score:  20/80 = 75% functionally impaired       EVAL  Functional Test: NDI  Score:   = 32/50 = 64% functionally impaired      Objective:  Modalities: hypervolt to right upper trap 12 min, prior to supine PROM for muscle guarding and increased tension  Precautions:   Pending injections with pain management, pending MRI for right shoulder and low back   Hx of C3C4 failed fusion surgery  Exercises: bolded completed  Exercise

## 2024-01-30 NOTE — PROGRESS NOTES
ADULT BEHAVIORAL HEALTH       Visit Date: 2/14/2024  Time of appointment:  10:00am   Time spent with Patient: 60 minutes.   This is patient's Initial appointment.    Reason for Consult:  Depression, anxiety.     Referring Provider/PCP:    Wally Perez MD Farooq, Amjad, MD      Pt provided informed consent for the behavioral health program. Discussed with patient model of service to include the limits of confidentiality (i.e. abuse reporting, suicide intervention, etc.) and short-term intervention focused approach. Pt indicated understanding.     PRESENTING PROBLEM AND HISTORY  Jeanette is a 39 y.o. female who presents for new evaluation and treatment of anxiety, depression.  She has the following symptoms: depressed mood, decreased appetite, increased appetite, weight gain, decreased sleep, excessive crying, fatigue/lack of energy, lack of motivation, excessive guilt, low self-esteem, feelings of worthlessness, feeling unable to make decisions, anger/irritability, racing thoughts/flight of ideas, feeling nervous, anxious, or on edge, racing worry thoughts, excessive anxiety and worry about specific stressors, excessive worry about a number of events or activities , inability to stop or control worry, history of trauma, and relationship issues. Onset of symptoms was approximately 5 years ago.  Symptoms have been gradually worsening since that time.  She denies current suicidal and homicidal ideation.  Family history significant for no psychiatric illness.  Risk factors: none.  Previous treatment includes  Cymbalta .  She complains of the following medication side effects: none.  Pt noted having issues with her  at times. Pt also noted a great deal of pain which she is having difficulty getting answers about. Pt noted pain is a part of her depression and anxiety.  Pt noted stress in relationships and working two jobs and dealing with pain and stress.    MENTAL STATUS EXAM  Mood was anxious and constricted

## 2024-01-31 ENCOUNTER — HOSPITAL ENCOUNTER (OUTPATIENT)
Dept: PHYSICAL THERAPY | Facility: CLINIC | Age: 39
Setting detail: THERAPIES SERIES
Discharge: HOME | End: 2024-01-31
Payer: COMMERCIAL

## 2024-01-31 PROCEDURE — 97140 MANUAL THERAPY 1/> REGIONS: CPT

## 2024-01-31 PROCEDURE — 97110 THERAPEUTIC EXERCISES: CPT

## 2024-01-31 NOTE — FLOWSHEET NOTE
[] [facility]  Outpatient Rehabilitation &  Therapy  [address]  [phone]  F: [phone] [x] [facility]  Outpatient Rehabilitation &  Therapy  [address]   [address]  P: [phone]  F: [phone]     Physical Therapy Daily Treatment Note    Date:  2024  Patient Name:  Jeanette Conteh    :  1985  MRN: [id number]  Physician: Dr. Wally Perez                                  Insurance: [facility] ([facility])  Medical Diagnosis:   M62.838 (ICD-10-CM) - Muscle spasm   M54.12 (ICD-10-CM) - Cervical radiculopathy   M96.1 (ICD-10-CM) - Failed back syndrome of cervical spine   M75.01 (ICD-10-CM) - Adhesive capsulitis of right shoulder                             Rehab Codes: M54.2, M25.611, M79.1, M79.601, R20.2,   Onset Date: 2023 (date of script, worsening over the past 3 months, ongoing for a year)                    Next 's appt.: 2024  Visit# / total visits: ;     Cancels/No Shows: 0 / 0    Subjective:    Pain:  [x] Yes  [] No Location:  right shoulder, neck, right arm, also in face and right shoulder, whole right upper side Pain Rating: (0-10 scale) 5-6/10    Pain altered Tx:  [] No  [x] Yes  Action: continued hypervolt to right upper trap, manual to cervical spine      Comments: Pt reports not feeling great today, headache, not migraines.  Monday to have steroid shots.  Had MRI of shoulder and MRI yesterday 2024.    EVAL  Functional Test: UEFS Score:  20/80 = 75% functionally impaired       EVAL  Functional Test: NDI  Score:   = 32/50 = 64% functionally impaired      Objective:  Modalities: hypervolt to right upper trap 12 min, prior to supine PROM for muscle guarding and increased tension  Precautions:   Pending injections with pain management, pending MRI for right shoulder and low back   Hx of C3C4 failed fusion surgery  Exercises: bolded completed  Exercise Reps/ Time Weight/ Level Comments   UBE  L1  2x   1 min, forward, 1 back

## 2024-02-08 ENCOUNTER — HOSPITAL ENCOUNTER (OUTPATIENT)
Dept: PHYSICAL THERAPY | Facility: CLINIC | Age: 39
Setting detail: THERAPIES SERIES
Discharge: HOME | End: 2024-02-08
Payer: COMMERCIAL

## 2024-02-08 PROCEDURE — 97140 MANUAL THERAPY 1/> REGIONS: CPT

## 2024-02-08 PROCEDURE — 97110 THERAPEUTIC EXERCISES: CPT

## 2024-02-08 NOTE — FLOWSHEET NOTE
[] [facility]  Outpatient Rehabilitation &  Therapy  [address]  [phone]  F: [phone] [x] [facility]  Outpatient Rehabilitation &  Therapy  [address]   [address]  P: [phone]  F: [phone]     Physical Therapy Daily Treatment Note    Date:  2024  Patient Name:  Jeanette Conteh    :  1985  MRN: [id number]  Physician: Dr. Wally Perez                                  Insurance: [facility] ([facility])  Medical Diagnosis:   M62.838 (ICD-10-CM) - Muscle spasm   M54.12 (ICD-10-CM) - Cervical radiculopathy   M96.1 (ICD-10-CM) - Failed back syndrome of cervical spine   M75.01 (ICD-10-CM) - Adhesive capsulitis of right shoulder                             Rehab Codes: M54.2, M25.611, M79.1, M79.601, R20.2,   Onset Date: 2023 (date of script, worsening over the past 3 months, ongoing for a year)                    Next 's appt.: 2024  Visit# / total visits: 10/12;     Cancels/No Shows: 0 / 0    Subjective:    Pain:  [x] Yes  [] No Location:  right shoulder, neck, right arm, also in face and right shoulder, whole right upper side Pain Rating: (0-10 scale) 9/10    Pain altered Tx:  [] No  [x] Yes  Action:  HELD active exercise, manual treatment and stretching today due to high pain levels.     Comments: Pt had steroid injection in back 3 days ago \"severe pain, in tears while did\"  She thinks \"all of this could be Sebastian Danlos Syndrome\" States she didn't feel like it was numb for the injection.   Currently pain in neck and down arm, came into therapy holding arm in to side.   Got MRI results - see below  No changes with botox treatment - tension right side of head and right now just \"so much pain\" everywhere    EVAL  Functional Test: UEFS Score:  20/80 = 75% functionally impaired       EVAL  Functional Test: NDI  Score:   = 32/50 = 64% functionally impaired      Objective:  Modalities: hypervolt to right upper trap 12 min, prior to supine PROM

## 2024-02-14 ENCOUNTER — OFFICE VISIT (OUTPATIENT)
Dept: BEHAVIORAL/MENTAL HEALTH CLINIC | Age: 39
End: 2024-02-14

## 2024-02-14 DIAGNOSIS — F33.1 MAJOR DEPRESSIVE DISORDER, RECURRENT EPISODE, MODERATE WITH ANXIOUS DISTRESS (HCC): Primary | ICD-10-CM

## 2024-02-14 PROBLEM — F32.A ANXIETY AND DEPRESSION: Status: RESOLVED | Noted: 2020-11-10 | Resolved: 2024-02-14

## 2024-02-14 PROBLEM — F41.9 ANXIETY AND DEPRESSION: Status: RESOLVED | Noted: 2020-11-10 | Resolved: 2024-02-14

## 2024-02-14 PROCEDURE — 90791 PSYCH DIAGNOSTIC EVALUATION: CPT | Performed by: SOCIAL WORKER

## 2024-02-15 ENCOUNTER — HOSPITAL ENCOUNTER (OUTPATIENT)
Dept: PHYSICAL THERAPY | Facility: CLINIC | Age: 39
Setting detail: THERAPIES SERIES
Discharge: HOME | End: 2024-02-15
Payer: COMMERCIAL

## 2024-02-15 PROCEDURE — 97140 MANUAL THERAPY 1/> REGIONS: CPT

## 2024-02-15 PROCEDURE — 97110 THERAPEUTIC EXERCISES: CPT

## 2024-02-15 NOTE — FLOWSHEET NOTE
[] [facility]  Outpatient Rehabilitation &  Therapy  [address]  [phone]  F: [phone] [x] [facility]  Outpatient Rehabilitation &  Therapy  [address]   [address]  P: [phone]  F: [phone]     Physical Therapy Daily Treatment Note    Date:  2/15/2024  Patient Name:  Jeanette Conteh    :  1985  MRN: [id number]  Physician: Dr. Wally Perez                                  Insurance: [facility] ([facility])  Medical Diagnosis:   M62.838 (ICD-10-CM) - Muscle spasm   M54.12 (ICD-10-CM) - Cervical radiculopathy   M96.1 (ICD-10-CM) - Failed back syndrome of cervical spine   M75.01 (ICD-10-CM) - Adhesive capsulitis of right shoulder                             Rehab Codes: M54.2, M25.611, M79.1, M79.601, R20.2,   Onset Date: 2023 (date of script, worsening over the past 3 months, ongoing for a year)                    Next 's appt.: 2024  Visit# / total visits: ;     Cancels/No Shows: 0 / 0    Subjective:    Pain:  [x] Yes  [] No Location:  right shoulder, neck, right arm, also in face and right shoulder, whole right upper side Pain Rating: (0-10 scale) 7+/10    Pain altered Tx:  [x] No  [] Yes  Action:      Comments: Slightly improved from last visit.  Last Sat had a \"pain management massage\"  felt it was beneficial.  Felt \"so good\" after, but next day sore again with daily activities.  \"Angry cleaned\" over the weekend, vacuumed which is agravating.     EVAL  Functional Test: UEFS Score:  20/80 = 75% functionally impaired       EVAL  Functional Test: NDI  Score:   = 32/50 = 64% functionally impaired      Objective:  Modalities: hypervolt to right upper trap 12 min, prior to supine PROM for muscle guarding and increased tension HELD  Precautions:   Pending injections with pain management, pending MRI for right shoulder and low back   Hx of C3C4 failed fusion surgery  Exercises:   Exercise Reps/ Time Weight/ Level Comments   UBE  L1  2x   1

## 2024-02-15 NOTE — PROGRESS NOTES
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 2/15/2024 unmet  Work 4 hours without increased pain in neck and right arm 2/15/2024 unmet    Goals for visits 13-18  Address unmet goals above    Treatment Plan:  [x] Therapeutic Exercise   47125  [] Iontophoresis: 4 mg/mL Dexamethasone Sodium Phosphate  mAmin  18229   [] Therapeutic Activity  86069 [] Vasopneumatic cold with compression  91024    [] Gait Training   42311 [] Ultrasound   50471   [] Neuromuscular Re-education  22156 [] Electrical Stimulation Unattended  52804   [x] Manual Therapy  13321 [] Electrical Stimulation Attended  45680   [x] Instruction in HEP  [] Lumbar/Cervical Traction  59282   [] Aquatic Therapy   44889 [x] Cold/hotpack    [x] Massage   27213      [] Dry Needling, 1 or 2 muscles  89102   [] Biofeedback, first 15 minutes   90912  [] Biofeedback, additional 15 minutes   90913 [] Dry Needling, 3 or more muscles  20561       Patient Status:     [] Continue per initial plan of care.    [x] Additional visits necessary.    [] Other:     Requested Frequency/Duration: 1 times per week for 6 treatments.        Electronically signed by SKINNY GALINDO PT on 2/15/2024 at 6:03 PM      If you have any questions or concerns, please don't hesitate to call.  Thank you for your referral.    Physician Signature:________________________________Date:__________________  By signing above or cosigning this note, I have reviewed this plan of care and certify a need for medically necessary rehabilitation services.

## 2024-02-20 ENCOUNTER — HOSPITAL ENCOUNTER (OUTPATIENT)
Dept: PHYSICAL THERAPY | Facility: CLINIC | Age: 39
Setting detail: THERAPIES SERIES
Discharge: HOME | End: 2024-02-20
Payer: COMMERCIAL

## 2024-02-20 PROCEDURE — 97140 MANUAL THERAPY 1/> REGIONS: CPT

## 2024-02-20 PROCEDURE — 97110 THERAPEUTIC EXERCISES: CPT

## 2024-02-20 NOTE — FLOWSHEET NOTE
[] [facility]  Outpatient Rehabilitation &  Therapy  [address]  [phone]  F: [phone] [x] [facility]  Outpatient Rehabilitation &  Therapy  [address]   [address]  P: [phone]  F: [phone]     Physical Therapy Daily Treatment Note    Date:  2024  Patient Name:  Jeanette Conteh    :  1985  MRN: [id number]  Physician: Dr. Wally Perez                                  Insurance: [facility] ([facility])  Medical Diagnosis:   M62.838 (ICD-10-CM) - Muscle spasm   M54.12 (ICD-10-CM) - Cervical radiculopathy   M96.1 (ICD-10-CM) - Failed back syndrome of cervical spine   M75.01 (ICD-10-CM) - Adhesive capsulitis of right shoulder                             Rehab Codes: M54.2, M25.611, M79.1, M79.601, R20.2,   Onset Date: 2023 (date of script, worsening over the past 3 months, ongoing for a year)                    Next 's appt.: 2024  Visit# / total visits: ;     Cancels/No Shows: 0 / 0    Subjective:    Pain:  [x] Yes  [] No Location:  right shoulder, neck, right arm, also in face and right shoulder, whole right upper side Pain Rating: (0-10 scale) 7 / 10    Pain altered Tx:  [x] No  [] Yes  Action:      Comments: Slightly improved again from last visit.    EVAL  Functional Test: UEFS Score:  20/80 = 75% functionally impaired       EVAL  Functional Test: NDI  Score:   = 32/50 = 64% functionally impaired        2024  Functional Test: UEFS Score:  19/80 = 76% functionally impaired       2024  Functional Test: NDI  Score: 35/59   = 70% functionally impaired      Objective:  Modalities: hypervolt to right upper trap 12 min, prior to supine PROM for muscle guarding and increased tension HELD  Precautions:   Hx of C3C4 failed fusion surgery  Exercises:   Exercise Reps/ Time Weight/ Level Comments   UBE  L1  2x   1 min, forward, 1 back 12-12 added, for mobility and peripheral blood flow to shoulders         Supine

## 2024-02-21 ENCOUNTER — OFFICE VISIT (OUTPATIENT)
Dept: BEHAVIORAL/MENTAL HEALTH CLINIC | Age: 39
End: 2024-02-21
Payer: COMMERCIAL

## 2024-02-21 DIAGNOSIS — F33.1 MAJOR DEPRESSIVE DISORDER, RECURRENT EPISODE, MODERATE WITH ANXIOUS DISTRESS (HCC): Primary | ICD-10-CM

## 2024-02-21 PROCEDURE — 90837 PSYTX W PT 60 MINUTES: CPT | Performed by: SOCIAL WORKER

## 2024-02-21 ASSESSMENT — PATIENT HEALTH QUESTIONNAIRE - PHQ9
SUM OF ALL RESPONSES TO PHQ QUESTIONS 1-9: 18
1. LITTLE INTEREST OR PLEASURE IN DOING THINGS: 1
5. POOR APPETITE OR OVEREATING: 3
SUM OF ALL RESPONSES TO PHQ QUESTIONS 1-9: 19
10. IF YOU CHECKED OFF ANY PROBLEMS, HOW DIFFICULT HAVE THESE PROBLEMS MADE IT FOR YOU TO DO YOUR WORK, TAKE CARE OF THINGS AT HOME, OR GET ALONG WITH OTHER PEOPLE: 1
SUM OF ALL RESPONSES TO PHQ QUESTIONS 1-9: 19
8. MOVING OR SPEAKING SO SLOWLY THAT OTHER PEOPLE COULD HAVE NOTICED. OR THE OPPOSITE, BEING SO FIGETY OR RESTLESS THAT YOU HAVE BEEN MOVING AROUND A LOT MORE THAN USUAL: 2
3. TROUBLE FALLING OR STAYING ASLEEP: 2
4. FEELING TIRED OR HAVING LITTLE ENERGY: 3
2. FEELING DOWN, DEPRESSED OR HOPELESS: 1
SUM OF ALL RESPONSES TO PHQ9 QUESTIONS 1 & 2: 2
9. THOUGHTS THAT YOU WOULD BE BETTER OFF DEAD, OR OF HURTING YOURSELF: 1
6. FEELING BAD ABOUT YOURSELF - OR THAT YOU ARE A FAILURE OR HAVE LET YOURSELF OR YOUR FAMILY DOWN: 3
7. TROUBLE CONCENTRATING ON THINGS, SUCH AS READING THE NEWSPAPER OR WATCHING TELEVISION: 3
SUM OF ALL RESPONSES TO PHQ QUESTIONS 1-9: 19

## 2024-02-21 ASSESSMENT — COLUMBIA-SUICIDE SEVERITY RATING SCALE - C-SSRS
6. HAVE YOU EVER DONE ANYTHING, STARTED TO DO ANYTHING, OR PREPARED TO DO ANYTHING TO END YOUR LIFE?: NO
2. HAVE YOU ACTUALLY HAD ANY THOUGHTS OF KILLING YOURSELF?: NO
1. WITHIN THE PAST MONTH, HAVE YOU WISHED YOU WERE DEAD OR WISHED YOU COULD GO TO SLEEP AND NOT WAKE UP?: NO

## 2024-02-21 NOTE — PROGRESS NOTES
ADULT BEHAVIORAL HEALTH FOLLOW UP      Visit Date: 2/21/2024   Time of appointment:  9:00am   Time spent with Patient: 40 minutes.   This is patient's third appointment.    Reason for Consult:  Depression, anxiety.     Referring Provider/PCP:    No ref. provider found  Wally Perez MD      Pt provided informed consent for the behavioral health program. Discussed with patient model of service to include the limits of confidentiality (i.e. abuse reporting, suicide intervention, etc.) and short-term intervention focused approach.  Pt indicated understanding.    EMMA Reid is a 39 y.o. female who presents for follow up of Depression, anxiety.  Supportive therapy used in session and pt was able to process recent events including getting a new job at [employer].  Staff prompted pt to identify positives of this change. Pt discussed recent stresses relating to scheduling. Pt was encouraged to use mindfulness coping skills, positive self talk and use assertive communication in personal interactions.     Previous Recommendations: Pt encouraged to use mindfulness and positive coping skills moving forward. Pt encouraged to attend follow up medical appointments.        MENTAL STATUS EXAM  Mood was anxious and depressed with anxious and depressed affect.   Suicidal ideation was denied.   Homicidal ideation was denied.   Hygiene was fair .  Dress was appropriate.   Behavior was Within Normal Limits with No observation of difficulties ambulating.   Attitude was Cooperative.  Eye-contact was fair.  Speech: rate - WNL, rhythm - WNL, volume - WNL.  Verbalizations were coherent.  Thought processes were intact and goal-oriented without evidence of delusions, hallucinations, obsessions, or laquita; with little cognitive distortions.   Associations were characterized by intact cognitive processes.  Pt was oriented to person, place, time, and general circumstances;  recent:  fair.  Insight and judgment

## 2024-02-21 NOTE — PATIENT INSTRUCTIONS
Pt encouraged to use mindfulness and positive coping skills moving forward. Pt encouraged to attend follow up medical appointments.

## 2024-02-23 NOTE — PROGRESS NOTES
ADULT BEHAVIORAL HEALTH FOLLOW UP      Visit Date: 3/6/2024  Time of appointment:  11:00am   Time spent with Patient: 55 minutes.   This is patient's third appointment.    Reason for Consult:  Depression, anxiety.     Referring Provider/PCP:    No ref. provider found  Wally Perez MD      Pt provided informed consent for the behavioral health program. Discussed with patient model of service to include the limits of confidentiality (i.e. abuse reporting, suicide intervention, etc.) and short-term intervention focused approach.  Pt indicated understanding.    EMMA Reid is a 39 y.o. female who presents for follow up of depression, anxiety.  Supportive therapy used in session and pt processed pain issues. Pt was able to acknowledge positive aspects of her life. Pt encouraged to engage in mindfulness and relaxation excesses in the future after noting positives. Pt was encouraged to move towards asking for help more often with her partner. Pt vented regarding stress and pt was able to help pt focus priorities and pt was able to narrow focus somewhat. Pt encouraged to use trial and error and the scientific method in working with her new rheumatologist.  Pt encouraged to discuss current medical issues so as to develop logical positive short term steps and routines. Pt discussed new job and noted being hopeful this would be easier on her body.     Previous Recommendations: Pt was encouraged to use mindfulness coping skills, positive self talk and use assertive communication in personal interactions.        MENTAL STATUS EXAM  Mood was anxious and depressed with anxious and depressed affect.   Suicidal ideation was denied.   Homicidal ideation was denied.   Hygiene was fair .  Dress was appropriate.   Behavior was Within Normal Limits with No observation of difficulties ambulating.   Attitude was Cooperative and Guarded.  Eye-contact was fair.  Speech: rate - WNL, rhythm - WNL, volume - WNL.  Verbalizations were

## 2024-02-28 ENCOUNTER — HOSPITAL ENCOUNTER (OUTPATIENT)
Dept: PHYSICAL THERAPY | Facility: CLINIC | Age: 39
Setting detail: THERAPIES SERIES
Discharge: HOME | End: 2024-02-28
Payer: COMMERCIAL

## 2024-02-28 ENCOUNTER — OFFICE VISIT (OUTPATIENT)
Dept: BEHAVIORAL/MENTAL HEALTH CLINIC | Age: 39
End: 2024-02-28
Payer: COMMERCIAL

## 2024-02-28 DIAGNOSIS — F33.1 MAJOR DEPRESSIVE DISORDER, RECURRENT EPISODE, MODERATE WITH ANXIOUS DISTRESS (HCC): Primary | ICD-10-CM

## 2024-02-28 PROCEDURE — 97110 THERAPEUTIC EXERCISES: CPT

## 2024-02-28 PROCEDURE — 90837 PSYTX W PT 60 MINUTES: CPT | Performed by: SOCIAL WORKER

## 2024-02-28 PROCEDURE — 97140 MANUAL THERAPY 1/> REGIONS: CPT

## 2024-02-28 NOTE — FLOWSHEET NOTE
[] [facility]  Outpatient Rehabilitation &  Therapy  [address]  [phone]  F: [phone] [x] [facility]  Outpatient Rehabilitation &  Therapy  [address]   [address]  P: [phone]  F: [phone]     Physical Therapy Daily Treatment Note    Date:  2024  Patient Name:  Jeanette Conteh    :  1985  MRN: [id number]  Physician: Dr. Wally Perez                                  Insurance: [facility] ([facility])  Medical Diagnosis:   M62.838 (ICD-10-CM) - Muscle spasm   M54.12 (ICD-10-CM) - Cervical radiculopathy   M96.1 (ICD-10-CM) - Failed back syndrome of cervical spine   M75.01 (ICD-10-CM) - Adhesive capsulitis of right shoulder                             Rehab Codes: M54.2, M25.611, M79.1, M79.601, R20.2,   Onset Date: 2023 (date of script, worsening over the past 3 months, ongoing for a year)                    Next 's appt.: 2024  Visit# / total visits: ;     Cancels/No Shows: 0 / 0    Subjective:    Pain:  [x] Yes  [] No Location:  right shoulder, neck, right arm, whole right upper side Pain Rating: (0-10 scale) 7 / 10    Pain altered Tx:  [] No  [x] Yes  Action: manual first     Comments: Slightly improved again from last visit.    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

## 2024-02-28 NOTE — PATIENT INSTRUCTIONS
Pt was encouraged to use mindfulness coping skills, positive self talk and use assertive communication in personal interactions.

## 2024-03-06 ENCOUNTER — HOSPITAL ENCOUNTER (OUTPATIENT)
Dept: PHYSICAL THERAPY | Facility: CLINIC | Age: 39
Setting detail: THERAPIES SERIES
Discharge: HOME | End: 2024-03-06
Payer: COMMERCIAL

## 2024-03-06 ENCOUNTER — OFFICE VISIT (OUTPATIENT)
Dept: BEHAVIORAL/MENTAL HEALTH CLINIC | Age: 39
End: 2024-03-06
Payer: COMMERCIAL

## 2024-03-06 DIAGNOSIS — F33.1 MAJOR DEPRESSIVE DISORDER, RECURRENT EPISODE, MODERATE WITH ANXIOUS DISTRESS (HCC): Primary | ICD-10-CM

## 2024-03-06 PROCEDURE — 97140 MANUAL THERAPY 1/> REGIONS: CPT

## 2024-03-06 PROCEDURE — 90837 PSYTX W PT 60 MINUTES: CPT | Performed by: SOCIAL WORKER

## 2024-03-06 PROCEDURE — 97110 THERAPEUTIC EXERCISES: CPT

## 2024-03-06 NOTE — PATIENT INSTRUCTIONS
Pt encouraged to discuss current medical issues so as to develop logical positive short term steps and routines.  Pt encouraged to work with partner to continue developing health routines and patterns which move pt forward without pt having a setback.

## 2024-03-06 NOTE — FLOWSHEET NOTE
change. .          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    [x] Patient would continue to benefit from skilled physical therapy services in order to: decrease neck and shoulder pain, improve shoulder strength work on posture symmetry in scapula    STG: (to be met in 6 treatments)  ? 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 1/3 unmet.  2/15 unmet, recent flare up  ? ROM: Full AROM of right shoulder to reach up to cupboards and groom hair without increased pain- 1/3 unmet with self care tasks  Sitting right shoulder        Flexion             146 AROm (improved 4 degrees from eval)        Abd                  101 (Improved 10 degrees from eval)        ER/IR WNL sitting  Correctly perform cervical isometrics to start light strengthening to cervical region to support neck considering previous fusion surgery- 1/3 met  Patient to be independent with home exercise program as demonstrated by performance with correct form without cues.  2/15 met     LTG: (to be met in 12 treatments)  ? Strength: to +4/5 in right shoulder to be able to lift trays at work, open doors, and clean with minimal pain 2/15/24 unmet  4/10 max pain in neck and right shoulder to allow her to look down to read without increased pain for 1 hour. 2/15/2024 unmet  Work 4 hours without increased pain in neck and right arm 2/15/2024 unmet     Goals for visits 13-18  Address unmet goals above        Patient goals:  able to use right arm with less pain    Pt. Education:  [] Yes  [x] No

## 2024-03-12 NOTE — FLOWSHEET NOTE
treatments: continue to work on neck and shoulder strengthening, posture awareness, manual to address tight musculature      Time In:      10:07 am    Time Out:       10: 55 pm      Electronically signed by:  SKINNY GALINDO PT

## 2024-03-13 ENCOUNTER — HOSPITAL ENCOUNTER (OUTPATIENT)
Dept: PHYSICAL THERAPY | Facility: CLINIC | Age: 39
Setting detail: THERAPIES SERIES
Discharge: HOME | End: 2024-03-13
Payer: COMMERCIAL

## 2024-03-13 PROCEDURE — 97140 MANUAL THERAPY 1/> REGIONS: CPT

## 2024-03-13 PROCEDURE — 97110 THERAPEUTIC EXERCISES: CPT

## 2024-03-20 ENCOUNTER — HOSPITAL ENCOUNTER (OUTPATIENT)
Dept: PHYSICAL THERAPY | Facility: CLINIC | Age: 39
Setting detail: THERAPIES SERIES
Discharge: HOME | End: 2024-03-20
Payer: COMMERCIAL

## 2024-03-20 PROCEDURE — 97110 THERAPEUTIC EXERCISES: CPT

## 2024-03-20 PROCEDURE — 97140 MANUAL THERAPY 1/> REGIONS: CPT

## 2024-03-20 NOTE — FLOWSHEET NOTE
[] [facility]  Outpatient Rehabilitation &  Therapy  [address]  [phone]  F: [phone] [x] [facility]  Outpatient Rehabilitation &  Therapy  [address]   [address]  P: [phone]  F: [phone]     Physical Therapy Daily Treatment Note    Date:  3/20/2024  Patient Name:  Jeanette Conteh    :  1985  MRN: [id number]  Physician: Dr. Wally Perez                                  Insurance: [facility] ([facility])  Medical Diagnosis:   M62.838 (ICD-10-CM) - Muscle spasm   M54.12 (ICD-10-CM) - Cervical radiculopathy   M96.1 (ICD-10-CM) - Failed back syndrome of cervical spine   M75.01 (ICD-10-CM) - Adhesive capsulitis of right shoulder                             Rehab Codes: M54.2, M25.611, M79.1, M79.601, R20.2,   Onset Date: 2023 (date of script, worsening over the past 3 months, ongoing for a year)                    Next 's appt.: 2024  Visit# / total visits: 15/18;     Cancels/No Shows: 0 / 0    Subjective:    Pain:  [x] Yes  [] No Location:  right shoulder, neck, right arm, whole right upper side Pain Rating: (0-10 scale) 7 / 10    Pain altered Tx:  [] No  [x] Yes  Action: manual first again    Comments:  Client reports \"about the same\" this past week \"not great or awful\"  Started new job was looking down for `~ 3 hours but this will not be typically the case after training.     EVAL  Functional Test: UEFS Score:  /80 = 75% functionally impaired       EVAL  Functional Test: NDI  Score:   = 32/50 = 64% functionally impaired        2024  Functional Test: UEFS Score:  /80 = 76% functionally impaired       2024  Functional Test: NDI  Score: 35/59   = 70% functionally impaired      Objective:  Modalities: hypervolt to right upper trap 12 min, prior to supine PROM for muscle guarding and increased tension  Precautions:   Hx of C3C4 failed fusion surgery  Exercises: bolded completed  Exercise Reps/ Time Weight/ Level Comments   UBE  L1

## 2024-03-27 ENCOUNTER — HOSPITAL ENCOUNTER (OUTPATIENT)
Dept: PHYSICAL THERAPY | Facility: CLINIC | Age: 39
Setting detail: THERAPIES SERIES
Discharge: HOME | End: 2024-03-27
Payer: COMMERCIAL

## 2024-03-27 PROCEDURE — 97140 MANUAL THERAPY 1/> REGIONS: CPT

## 2024-03-27 PROCEDURE — 97110 THERAPEUTIC EXERCISES: CPT

## 2024-03-27 NOTE — FLOWSHEET NOTE
[] [facility]  Outpatient Rehabilitation &  Therapy  [address]  [phone]  F: [phone] [x] [facility]  Outpatient Rehabilitation &  Therapy  [address]   [address]  P: [phone]  F: [phone]     Physical Therapy Daily Treatment Note    Date:  3/27/2024  Patient Name:  Jeanette Conteh    :  1985  MRN: [id number]  Physician: Dr. Wally Perez                                  Insurance: [employer] ([facility])  Medical Diagnosis:   M62.838 (ICD-10-CM) - Muscle spasm   M54.12 (ICD-10-CM) - Cervical radiculopathy   M96.1 (ICD-10-CM) - Failed back syndrome of cervical spine   M75.01 (ICD-10-CM) - Adhesive capsulitis of right shoulder                             Rehab Codes: M54.2, M25.611, M79.1, M79.601, R20.2,   Onset Date: 2023 (date of script, worsening over the past 3 months, ongoing for a year)                    Next 's appt.: 2024  Visit# / total visits: ;     Cancels/No Shows: 0 / 0    Subjective:    Pain:  [x] Yes  [] No Location:  right shoulder, neck, right arm, whole right upper side Pain Rating: (0-10 scale) 7 / 10    Pain altered Tx:  [] No  [x] Yes  Action: manual first again    Comments:  Client reports \"about the same\"  typical tightness and soreness    EVAL  Functional Test: UEFS Score:  20/80 = 75% functionally impaired       EVAL  Functional Test: NDI  Score:   = 32/50 = 64% functionally impaired        2024  Functional Test: UEFS Score:  19/80 = 76% functionally impaired       2024  Functional Test: NDI  Score: 35/59   = 70% functionally impaired      Objective:  Modalities: hypervolt to right upper trap 12 min, prior to supine PROM for muscle guarding and increased tension  Precautions:   Hx of C3C4 failed fusion surgery  Exercises: bolded completed  Exercise Reps/ Time Weight/ Level Comments   UBE  L1  2x   1 min, forward, 1 back 12-12 added, for mobility and peripheral blood flow to shoulders         Supine

## 2024-04-03 ENCOUNTER — HOSPITAL ENCOUNTER (OUTPATIENT)
Dept: PHYSICAL THERAPY | Facility: CLINIC | Age: 39
Setting detail: THERAPIES SERIES
Discharge: HOME | End: 2024-04-03
Payer: COMMERCIAL

## 2024-04-03 PROCEDURE — 97110 THERAPEUTIC EXERCISES: CPT

## 2024-04-03 PROCEDURE — 97140 MANUAL THERAPY 1/> REGIONS: CPT

## 2024-04-03 NOTE — FLOWSHEET NOTE
treatments: review HEP, assess for discharge  Time In:       10:02     Time Out:        10: 53 am    Electronically signed by:  SKINNY GALINDO PT

## 2024-04-17 ENCOUNTER — HOSPITAL ENCOUNTER (OUTPATIENT)
Dept: PHYSICAL THERAPY | Facility: CLINIC | Age: 39
Setting detail: THERAPIES SERIES
Discharge: HOME | End: 2024-04-17
Payer: COMMERCIAL

## 2024-04-17 PROCEDURE — 97140 MANUAL THERAPY 1/> REGIONS: CPT

## 2024-04-17 PROCEDURE — 97110 THERAPEUTIC EXERCISES: CPT

## 2024-04-17 NOTE — DISCHARGE SUMMARY
(Improved 10 degrees from eval)        ER/IR WNL sitting  Correctly perform cervical isometrics to start light strengthening to cervical region to support neck considering previous fusion surgery- 1/3 met  Patient to be independent with home exercise program as demonstrated by performance with correct form without cues.  2/15 met     LTG: (to be met in 12 treatments)  ? Strength: to +4/5 in right shoulder to be able to lift trays at work, open doors, and clean with minimal pain 4/17/24 unmet  4/10 max pain in neck and right shoulder to allow her to look down to read without increased pain for 1 hour. 4/17 unable  Work 4 hours without increased pain in neck and right arm 4/17 able but sore     Goals for visits 13-18  Address unmet goals above       Patient goals:  able to use right arm with less pain    Pt. Education:  [] Yes  [] No  [x] Reviewed Prior HEP/Ed  Method of Education: [x] Verbal  [x] Demo  [] Written  2-8-2024 unloading 5-10 min, 1-2  day and before long work shifts - use cervical roll- supine    Access Code: [email or web address]  URL: [email or web address]  Date: 12/07/2023  Prepared by: Tracy Rivera  - Seated Scapular Retraction  - 2 x daily - 7 x weekly - 1 sets - 10 reps - 5 hold  - Standing shoulder flexion wall slides  - 2 x daily - 7 x weekly - 1 sets - 10 reps - 10 hold  - Standing Shoulder Abduction Slides at Wall  - 2 x daily - 7 x weekly - 1 sets - 10 reps - 10 hold  - Seated Cervical Retraction  - 2 x daily - 7 x weekly - 1 sets - 10 reps - 5 hold  - Neck Sidebending  - 2 x daily - 7 x weekly - 1 sets - 10 reps - 5 hold  - Neck Rotation  - 2 x daily - 7 x weekly - 1 sets - 10 reps - 5 hold  Date: 12/26/2023- issued lime tband   Prepared by: Dina Aguirre  - Neck Rotation  - 2 x daily - 7 x weekly - 1 sets - 10 reps - 5 hold  - Isometric Cervical Extension at Wall with Ball  - 1 x daily - 7 x weekly - 5 sets - 5 hold  - Isometric Cervical Flexion at Wall with Ball  - 1 x daily - 7 x weekly -

## 2025-01-11 NOTE — FLOWSHEET NOTE
weekly - 5 sets - 5 hold  - Isometric Cervical Rotation at Wall with Ball  - 1 x daily - 7 x weekly - 5 sets - 5 hold  - Shoulder extension with resistance - Neutral  - 1 x daily - 7 x weekly - 1-2 sets - 10 reps  - Standing Shoulder Row with Anchored Resistance  - 1 x daily - 7 x weekly - 1-2 sets - 10 reps    Comprehension of Education:  [] Verbalizes understanding. [] Demonstrates understanding. [] Needs review. [x] Demonstrates/verbalizes HEP/Ed previously given. Plan: [x] Continue current frequency toward long and short term goals.     [x] Specific Instructions for subsequent treatments: continue to work on neck and shoulder strengthening, posture awareness, manual to address tight musculature      Time In: 8:00 am        Time Out:  9:00 am    Electronically signed by:  Tegan Frey PTA Alert-The patient is alert, awake and responds to voice. The patient is oriented to time, place, and person. The triage nurse is able to obtain subjective information.

## (undated) DEVICE — TOTAL TRAY, 16FR 10ML SIL FOLEY, URN: Brand: MEDLINE

## (undated) DEVICE — SINGLE DOSE EPI TY

## (undated) DEVICE — 3M™ STERI-STRIP™ COMPOUND BENZOIN TINCTURE 40 BAGS/CARTON 4 CARTONS/CASE C1544: Brand: 3M™ STERI-STRIP™

## (undated) DEVICE — GLOVE SURG SZ 65 THK91MIL LTX FREE SYN POLYISOPRENE

## (undated) DEVICE — SUTURE VCRL SZ 4-0 L18IN ABSRB UD L19MM PS-2 3/8 CIR PRIM J496H

## (undated) DEVICE — DRAPE THYROID

## (undated) DEVICE — DRESSING PETRO W3XL8IN OIL EMUL N ADH GZ KNIT IMPREG CELOS

## (undated) DEVICE — SVMMC ORTH SPINE PK

## (undated) DEVICE — Z DISCONTINUED APPLICATOR SURG PREP 0.35OZ 2% CHG 70% ISO ALC W/ HI LT

## (undated) DEVICE — NEEDLE SPNL L4.5IN OD22GA QNCKE TYP SPINOCAN

## (undated) DEVICE — TOWEL,OR,DSP,ST,NATURAL,DLX,4/PK,20PK/CS: Brand: MEDLINE

## (undated) DEVICE — Device

## (undated) DEVICE — GOWN,AURORA,NONRNF,XL,30/CS: Brand: MEDLINE

## (undated) DEVICE — GLOVE ORANGE PI 7   MSG9070

## (undated) DEVICE — GLOVE ORANGE PI 8   MSG9080

## (undated) DEVICE — WAX SURG 2.5GM HEMSTAT BNE BEESWAX PARAFFIN ISO PALMITATE

## (undated) DEVICE — NEEDLE SPNL 18GA L3.5IN W/ QNCKE SHARPER BVL DURA CLICK

## (undated) DEVICE — GLOVE SURG SZ 75 CRM LTX FREE POLYISOPRENE POLYMER BEAD ANTI

## (undated) DEVICE — APPLICATOR MEDICATED 10.5 CC SOLUTION HI LT ORNG CHLORAPREP

## (undated) DEVICE — NEEDLE SPINAL 22GA L3.5IN SPINOCAN

## (undated) DEVICE — SUTURE VCRL SZ 0 L27IN ABSRB UD L36MM CT-1 1/2 CIR J260H

## (undated) DEVICE — BLADE ES L6IN ELASTOMERIC COAT INSUL DURABLE BEND UPTO

## (undated) DEVICE — DRAPE C ARM UNIV W41XL74IN CLR PLAS XR VELC CLSR POLY STRP

## (undated) DEVICE — E-Z CLEAN, NON-STICK, PTFE COATED, ELECTROSURGICAL BLADE ELECTRODE, MODIFIED EXTENDED INSULATION, 2.5 INCH (6.35 CM): Brand: MEGADYNE

## (undated) DEVICE — TOWEL,OR,DSP,ST,BLUE,DLX,XR,4/PK,20PK/CS: Brand: MEDLINE

## (undated) DEVICE — BUR SURG OD30MM DMND RND EXTRA COARSE TAPR N FLUT ST FOR

## (undated) DEVICE — PROTECTOR ULN NRV PUR FOAM HK LOOP STRP ANATOMICALLY

## (undated) DEVICE — GLOVE ORANGE PI 7 1/2   MSG9075

## (undated) DEVICE — DISPOSABLE BIPOLAR CABLE, 12FT (3.6M): Brand: KIRWAN

## (undated) DEVICE — STRIP,CLOSURE,WOUND,MEDI-STRIP,1/2X4: Brand: MEDLINE

## (undated) DEVICE — SHEET DRAPE FULL 70X100

## (undated) DEVICE — DRESSING TRNSPAR W4XL4.8IN W/ N ADH PD SURESITE-123 PLUSPD

## (undated) DEVICE — SUTURE ABSRB BRAID COAT VLT MO-4 NO 1 27IN VCRL J437H

## (undated) DEVICE — BUR SURG TAPR ROUTER

## (undated) DEVICE — POSITIONER,HEAD,MULTIRING,36CS: Brand: MEDLINE

## (undated) DEVICE — C-ARMOR C-ARM EQUIPMENT COVERS CLEAR STERILE UNIVERSAL FIT 12 PER CASE: Brand: C-ARMOR

## (undated) DEVICE — CONTAINER,SPECIMEN,4OZ,OR STRL: Brand: MEDLINE

## (undated) DEVICE — GLOVE SURG SZ 8 L12IN FNGR THK87MIL WHT LTX FREE

## (undated) DEVICE — 7 FRENCH DRAIN SYSTEM, STERILE: Brand: TLS

## (undated) DEVICE — SUTURE VIC + ABS BR UD X1 2-0 27IN VCP459H

## (undated) DEVICE — SUTURE VIC + ANTIBACT NDL MO-4 1-0 27 VCP437H

## (undated) DEVICE — SUTURE VCRL SZ 2-0 L27IN ABSRB UD L22MM X-1 1/2 CIR REV CUT J459H

## (undated) DEVICE — EXTENSION SET IV 12 IN 0.45 CC INFUSION MINIBOR TBNG CLMP

## (undated) DEVICE — 1010 S-DRAPE TOWEL DRAPE 10/BX: Brand: STERI-DRAPE™

## (undated) DEVICE — JACKSON / MODULAR SPINAL TABLE STYLE POSITIONING KIT - STANDARD: Brand: SOULE MEDICAL

## (undated) DEVICE — GLOVE EXAM M L95IN FNGR THK35MIL PALM THK24MIL OFF WHT

## (undated) DEVICE — SPONGE,PEANUT,XRAY,ST,SM,3/8",5/CARD: Brand: MEDLINE INDUSTRIES, INC.

## (undated) DEVICE — GARMENT,MEDLINE,DVT,INT,CALF,MED, GEN2: Brand: MEDLINE